# Patient Record
Sex: MALE | Race: WHITE | NOT HISPANIC OR LATINO | URBAN - METROPOLITAN AREA
[De-identification: names, ages, dates, MRNs, and addresses within clinical notes are randomized per-mention and may not be internally consistent; named-entity substitution may affect disease eponyms.]

---

## 2017-02-16 ENCOUNTER — EMERGENCY (EMERGENCY)
Facility: HOSPITAL | Age: 52
LOS: 1 days | Discharge: HOME | End: 2017-02-16
Attending: EMERGENCY MEDICINE
Payer: MEDICARE

## 2017-02-16 VITALS
SYSTOLIC BLOOD PRESSURE: 152 MMHG | WEIGHT: 158.07 LBS | OXYGEN SATURATION: 3 % | RESPIRATION RATE: 20 BRPM | HEIGHT: 73 IN | HEART RATE: 99 BPM | DIASTOLIC BLOOD PRESSURE: 91 MMHG | TEMPERATURE: 99 F

## 2017-02-16 DIAGNOSIS — E11.9 TYPE 2 DIABETES MELLITUS WITHOUT COMPLICATIONS: ICD-10-CM

## 2017-02-16 DIAGNOSIS — F17.200 NICOTINE DEPENDENCE, UNSPECIFIED, UNCOMPLICATED: ICD-10-CM

## 2017-02-16 DIAGNOSIS — R11.2 NAUSEA WITH VOMITING, UNSPECIFIED: ICD-10-CM

## 2017-02-16 DIAGNOSIS — R11.10 VOMITING, UNSPECIFIED: ICD-10-CM

## 2017-02-16 LAB
ALBUMIN SERPL ELPH-MCNC: 4.7 G/DL — SIGNIFICANT CHANGE UP (ref 3.3–5.2)
ALP SERPL-CCNC: 89 U/L — SIGNIFICANT CHANGE UP (ref 40–120)
ALT FLD-CCNC: 8 U/L — SIGNIFICANT CHANGE UP
ANION GAP SERPL CALC-SCNC: 15 MMOL/L — SIGNIFICANT CHANGE UP (ref 5–17)
APPEARANCE UR: CLEAR — SIGNIFICANT CHANGE UP
AST SERPL-CCNC: 10 U/L — SIGNIFICANT CHANGE UP
BASOPHILS # BLD AUTO: 0 K/UL — SIGNIFICANT CHANGE UP (ref 0–0.2)
BASOPHILS NFR BLD AUTO: 0 % — SIGNIFICANT CHANGE UP (ref 0–2)
BILIRUB SERPL-MCNC: 0.5 MG/DL — SIGNIFICANT CHANGE UP (ref 0.4–2)
BILIRUB UR-MCNC: NEGATIVE — SIGNIFICANT CHANGE UP
BUN SERPL-MCNC: 14 MG/DL — SIGNIFICANT CHANGE UP (ref 8–20)
CALCIUM SERPL-MCNC: 9.8 MG/DL — SIGNIFICANT CHANGE UP (ref 8.6–10.2)
CHLORIDE SERPL-SCNC: 94 MMOL/L — LOW (ref 98–107)
CO2 SERPL-SCNC: 34 MMOL/L — HIGH (ref 22–29)
COLOR SPEC: YELLOW — SIGNIFICANT CHANGE UP
CREAT SERPL-MCNC: 0.59 MG/DL — SIGNIFICANT CHANGE UP (ref 0.5–1.3)
DIFF PNL FLD: NEGATIVE — SIGNIFICANT CHANGE UP
EPI CELLS # UR: SIGNIFICANT CHANGE UP
GLUCOSE SERPL-MCNC: 343 MG/DL — HIGH (ref 70–115)
GLUCOSE UR QL: 1000 MG/DL
HCT VFR BLD CALC: 48.4 % — SIGNIFICANT CHANGE UP (ref 42–52)
HGB BLD-MCNC: 16.9 G/DL — SIGNIFICANT CHANGE UP (ref 14–18)
KETONES UR-MCNC: ABNORMAL
LEUKOCYTE ESTERASE UR-ACNC: NEGATIVE — SIGNIFICANT CHANGE UP
LIDOCAIN IGE QN: 6 U/L — LOW (ref 22–51)
LYMPHOCYTES # BLD AUTO: 0.6 K/UL — LOW (ref 1–4.8)
LYMPHOCYTES # BLD AUTO: 2 % — LOW (ref 20–55)
MCHC RBC-ENTMCNC: 31.4 PG — HIGH (ref 27–31)
MCHC RBC-ENTMCNC: 34.9 G/DL — SIGNIFICANT CHANGE UP (ref 32–36)
MCV RBC AUTO: 89.8 FL — SIGNIFICANT CHANGE UP (ref 80–94)
MONOCYTES # BLD AUTO: 0.5 K/UL — SIGNIFICANT CHANGE UP (ref 0–0.8)
MONOCYTES NFR BLD AUTO: 3 % — SIGNIFICANT CHANGE UP (ref 3–10)
NEUTROPHILS # BLD AUTO: 8.3 K/UL — HIGH (ref 1.8–8)
NEUTROPHILS NFR BLD AUTO: 88 % — HIGH (ref 37–73)
NEUTS BAND # BLD: 5 % — SIGNIFICANT CHANGE UP (ref 0–8)
NITRITE UR-MCNC: NEGATIVE — SIGNIFICANT CHANGE UP
PH UR: 8 — SIGNIFICANT CHANGE UP (ref 4.8–8)
PLAT MORPH BLD: NORMAL — SIGNIFICANT CHANGE UP
PLATELET # BLD AUTO: 137 K/UL — LOW (ref 150–400)
POTASSIUM SERPL-MCNC: 3.5 MMOL/L — SIGNIFICANT CHANGE UP (ref 3.5–5.3)
POTASSIUM SERPL-SCNC: 3.5 MMOL/L — SIGNIFICANT CHANGE UP (ref 3.5–5.3)
PROT SERPL-MCNC: 7.8 G/DL — SIGNIFICANT CHANGE UP (ref 6.6–8.7)
PROT UR-MCNC: 30 MG/DL
RBC # BLD: 5.39 M/UL — SIGNIFICANT CHANGE UP (ref 4.6–6.2)
RBC # FLD: 13.1 % — SIGNIFICANT CHANGE UP (ref 11–15.6)
RBC BLD AUTO: NORMAL — SIGNIFICANT CHANGE UP
SODIUM SERPL-SCNC: 143 MMOL/L — SIGNIFICANT CHANGE UP (ref 135–145)
SP GR SPEC: 1.01 — SIGNIFICANT CHANGE UP (ref 1.01–1.02)
UROBILINOGEN FLD QL: NEGATIVE MG/DL — SIGNIFICANT CHANGE UP
VARIANT LYMPHS # BLD: 2 % — SIGNIFICANT CHANGE UP (ref 0–6)
WBC # BLD: 9.4 K/UL — SIGNIFICANT CHANGE UP (ref 4.8–10.8)
WBC # FLD AUTO: 9.4 K/UL — SIGNIFICANT CHANGE UP (ref 4.8–10.8)

## 2017-02-16 PROCEDURE — 99284 EMERGENCY DEPT VISIT MOD MDM: CPT

## 2017-02-16 RX ORDER — SODIUM CHLORIDE 9 MG/ML
3 INJECTION INTRAMUSCULAR; INTRAVENOUS; SUBCUTANEOUS ONCE
Qty: 0 | Refills: 0 | Status: COMPLETED | OUTPATIENT
Start: 2017-02-16 | End: 2017-02-16

## 2017-02-16 RX ORDER — SODIUM CHLORIDE 9 MG/ML
1000 INJECTION INTRAMUSCULAR; INTRAVENOUS; SUBCUTANEOUS ONCE
Qty: 0 | Refills: 0 | Status: COMPLETED | OUTPATIENT
Start: 2017-02-16 | End: 2017-02-16

## 2017-02-16 RX ORDER — PANTOPRAZOLE SODIUM 20 MG/1
40 TABLET, DELAYED RELEASE ORAL ONCE
Qty: 0 | Refills: 0 | Status: COMPLETED | OUTPATIENT
Start: 2017-02-16 | End: 2017-02-16

## 2017-02-16 RX ORDER — ONDANSETRON 8 MG/1
4 TABLET, FILM COATED ORAL ONCE
Qty: 0 | Refills: 0 | Status: COMPLETED | OUTPATIENT
Start: 2017-02-16 | End: 2017-02-16

## 2017-02-16 RX ADMIN — ONDANSETRON 4 MILLIGRAM(S): 8 TABLET, FILM COATED ORAL at 20:59

## 2017-02-16 RX ADMIN — PANTOPRAZOLE SODIUM 40 MILLIGRAM(S): 20 TABLET, DELAYED RELEASE ORAL at 21:06

## 2017-02-16 RX ADMIN — SODIUM CHLORIDE 3 MILLILITER(S): 9 INJECTION INTRAMUSCULAR; INTRAVENOUS; SUBCUTANEOUS at 20:59

## 2017-02-16 RX ADMIN — SODIUM CHLORIDE 1000 MILLILITER(S): 9 INJECTION INTRAMUSCULAR; INTRAVENOUS; SUBCUTANEOUS at 20:59

## 2017-02-16 NOTE — ED PROVIDER NOTE - MEDICAL DECISION MAKING DETAILS
Pt with multiple episodes of vomiting. Abd soft nontender. Will give fluids, zofran. Pt will be signed out to Dr. Lewis pending re-eval.

## 2017-02-16 NOTE — ED ADULT NURSE NOTE - OBJECTIVE STATEMENT
Assumed pt care @ 2030. Pt received sitting on stretcher in NAD. Pt AOx3 C/O  vomiting since today. No other symptoms. Pt is a diabetic. Neuro WNL. PERRLA. Lungs CTA, RR even unlabored. Abd soft non tender, + bowel sounds x 4quads. Skin warm, dry, color appropriate for age and race.

## 2017-02-16 NOTE — ED PROVIDER NOTE - PROGRESS NOTE DETAILS
Pt. was noted to be febrile prior to being discharged. Pt. had tolerated PO. NO vomiting. No abdominal pain. Pt. stated that he felt better. Pt. was found to be shaky and diaphoretic. No cough. NO sob. Tylenol ordered for fever and CXR ordered to r/o possible pneumonia. Pt. waiting for cxr. Pt. with no complaint. Pt. sleeping. Pt. re-evaluated. Pt. now afebrile. No SOB. No cough. Pt.'s cxr with no obvious signs of pneumonia. Pt. able to stand and ambulate. Still no abdominal pain. No vomiting. Pt. will be given cab voucher to go back to her nursing home.

## 2017-02-17 VITALS
TEMPERATURE: 99 F | HEART RATE: 88 BPM | DIASTOLIC BLOOD PRESSURE: 74 MMHG | SYSTOLIC BLOOD PRESSURE: 129 MMHG | RESPIRATION RATE: 19 BRPM | OXYGEN SATURATION: 93 %

## 2017-02-17 PROCEDURE — 83690 ASSAY OF LIPASE: CPT

## 2017-02-17 PROCEDURE — 96374 THER/PROPH/DIAG INJ IV PUSH: CPT

## 2017-02-17 PROCEDURE — 80053 COMPREHEN METABOLIC PANEL: CPT

## 2017-02-17 PROCEDURE — 96375 TX/PRO/DX INJ NEW DRUG ADDON: CPT

## 2017-02-17 PROCEDURE — 84484 ASSAY OF TROPONIN QUANT: CPT

## 2017-02-17 PROCEDURE — 99284 EMERGENCY DEPT VISIT MOD MDM: CPT | Mod: 25

## 2017-02-17 PROCEDURE — 71046 X-RAY EXAM CHEST 2 VIEWS: CPT

## 2017-02-17 PROCEDURE — 71020: CPT | Mod: 26

## 2017-02-17 PROCEDURE — 81001 URINALYSIS AUTO W/SCOPE: CPT

## 2017-02-17 PROCEDURE — 85027 COMPLETE CBC AUTOMATED: CPT

## 2017-02-17 RX ORDER — SODIUM CHLORIDE 9 MG/ML
1000 INJECTION INTRAMUSCULAR; INTRAVENOUS; SUBCUTANEOUS ONCE
Qty: 0 | Refills: 0 | Status: COMPLETED | OUTPATIENT
Start: 2017-02-17 | End: 2017-02-17

## 2017-02-17 RX ORDER — ACETAMINOPHEN 500 MG
650 TABLET ORAL ONCE
Qty: 0 | Refills: 0 | Status: COMPLETED | OUTPATIENT
Start: 2017-02-17 | End: 2017-02-17

## 2017-02-17 RX ADMIN — Medication 650 MILLIGRAM(S): at 03:15

## 2017-02-17 RX ADMIN — SODIUM CHLORIDE 2000 MILLILITER(S): 9 INJECTION INTRAMUSCULAR; INTRAVENOUS; SUBCUTANEOUS at 03:05

## 2017-02-17 NOTE — ED ADULT NURSE REASSESSMENT NOTE - NS ED NURSE REASSESS COMMENT FT1
Pt tolerated po fluids without difficulty. Pt found with beads of sweat across the forehead, vitals and sugar checked. MD Lewis aware of results. Will continue to monitor.

## 2017-03-05 ENCOUNTER — EMERGENCY (EMERGENCY)
Facility: HOSPITAL | Age: 52
LOS: 1 days | Discharge: HOME | End: 2017-03-05
Attending: EMERGENCY MEDICINE
Payer: MEDICARE

## 2017-03-05 VITALS
DIASTOLIC BLOOD PRESSURE: 71 MMHG | OXYGEN SATURATION: 96 % | HEART RATE: 93 BPM | SYSTOLIC BLOOD PRESSURE: 110 MMHG | WEIGHT: 182.98 LBS | TEMPERATURE: 98 F | RESPIRATION RATE: 18 BRPM | HEIGHT: 73 IN

## 2017-03-05 VITALS
TEMPERATURE: 97 F | HEART RATE: 69 BPM | DIASTOLIC BLOOD PRESSURE: 74 MMHG | OXYGEN SATURATION: 97 % | RESPIRATION RATE: 20 BRPM | SYSTOLIC BLOOD PRESSURE: 113 MMHG

## 2017-03-05 DIAGNOSIS — J44.9 CHRONIC OBSTRUCTIVE PULMONARY DISEASE, UNSPECIFIED: ICD-10-CM

## 2017-03-05 DIAGNOSIS — F20.0 PARANOID SCHIZOPHRENIA: ICD-10-CM

## 2017-03-05 DIAGNOSIS — E11.65 TYPE 2 DIABETES MELLITUS WITH HYPERGLYCEMIA: ICD-10-CM

## 2017-03-05 DIAGNOSIS — F17.210 NICOTINE DEPENDENCE, CIGARETTES, UNCOMPLICATED: ICD-10-CM

## 2017-03-05 DIAGNOSIS — R45.1 RESTLESSNESS AND AGITATION: ICD-10-CM

## 2017-03-05 DIAGNOSIS — R69 ILLNESS, UNSPECIFIED: ICD-10-CM

## 2017-03-05 LAB
ALBUMIN SERPL ELPH-MCNC: 4 G/DL — SIGNIFICANT CHANGE UP (ref 3.3–5.2)
ALP SERPL-CCNC: 78 U/L — SIGNIFICANT CHANGE UP (ref 40–120)
ALT FLD-CCNC: 7 U/L — SIGNIFICANT CHANGE UP
ANION GAP SERPL CALC-SCNC: 11 MMOL/L — SIGNIFICANT CHANGE UP (ref 5–17)
APPEARANCE UR: CLEAR — SIGNIFICANT CHANGE UP
AST SERPL-CCNC: 8 U/L — SIGNIFICANT CHANGE UP
BASOPHILS # BLD AUTO: 0 K/UL — SIGNIFICANT CHANGE UP (ref 0–0.2)
BASOPHILS NFR BLD AUTO: 0.2 % — SIGNIFICANT CHANGE UP (ref 0–2)
BILIRUB SERPL-MCNC: 0.2 MG/DL — LOW (ref 0.4–2)
BILIRUB UR-MCNC: NEGATIVE — SIGNIFICANT CHANGE UP
BUN SERPL-MCNC: 13 MG/DL — SIGNIFICANT CHANGE UP (ref 8–20)
CALCIUM SERPL-MCNC: 9.2 MG/DL — SIGNIFICANT CHANGE UP (ref 8.6–10.2)
CHLORIDE SERPL-SCNC: 97 MMOL/L — LOW (ref 98–107)
CO2 SERPL-SCNC: 29 MMOL/L — SIGNIFICANT CHANGE UP (ref 22–29)
COLOR SPEC: YELLOW — SIGNIFICANT CHANGE UP
CREAT SERPL-MCNC: 0.71 MG/DL — SIGNIFICANT CHANGE UP (ref 0.5–1.3)
DIFF PNL FLD: NEGATIVE — SIGNIFICANT CHANGE UP
EOSINOPHIL # BLD AUTO: 0.1 K/UL — SIGNIFICANT CHANGE UP (ref 0–0.5)
EOSINOPHIL NFR BLD AUTO: 1.4 % — SIGNIFICANT CHANGE UP (ref 0–5)
GLUCOSE SERPL-MCNC: 372 MG/DL — HIGH (ref 70–115)
GLUCOSE UR QL: 1000 MG/DL
HCT VFR BLD CALC: 38.7 % — LOW (ref 42–52)
HGB BLD-MCNC: 13.1 G/DL — LOW (ref 14–18)
KETONES UR-MCNC: ABNORMAL
LEUKOCYTE ESTERASE UR-ACNC: NEGATIVE — SIGNIFICANT CHANGE UP
LYMPHOCYTES # BLD AUTO: 1.3 K/UL — SIGNIFICANT CHANGE UP (ref 1–4.8)
LYMPHOCYTES # BLD AUTO: 27.3 % — SIGNIFICANT CHANGE UP (ref 20–55)
MCHC RBC-ENTMCNC: 30.6 PG — SIGNIFICANT CHANGE UP (ref 27–31)
MCHC RBC-ENTMCNC: 33.9 G/DL — SIGNIFICANT CHANGE UP (ref 32–36)
MCV RBC AUTO: 90.4 FL — SIGNIFICANT CHANGE UP (ref 80–94)
MONOCYTES # BLD AUTO: 0.5 K/UL — SIGNIFICANT CHANGE UP (ref 0–0.8)
MONOCYTES NFR BLD AUTO: 10.4 % — HIGH (ref 3–10)
NEUTROPHILS # BLD AUTO: 2.9 K/UL — SIGNIFICANT CHANGE UP (ref 1.8–8)
NEUTROPHILS NFR BLD AUTO: 60.7 % — SIGNIFICANT CHANGE UP (ref 37–73)
NITRITE UR-MCNC: NEGATIVE — SIGNIFICANT CHANGE UP
PCP SPEC-MCNC: SIGNIFICANT CHANGE UP
PH UR: 6.5 — SIGNIFICANT CHANGE UP (ref 4.8–8)
PLATELET # BLD AUTO: 228 K/UL — SIGNIFICANT CHANGE UP (ref 150–400)
POTASSIUM SERPL-MCNC: 4.2 MMOL/L — SIGNIFICANT CHANGE UP (ref 3.5–5.3)
POTASSIUM SERPL-SCNC: 4.2 MMOL/L — SIGNIFICANT CHANGE UP (ref 3.5–5.3)
PROT SERPL-MCNC: 6.5 G/DL — LOW (ref 6.6–8.7)
PROT UR-MCNC: NEGATIVE MG/DL — SIGNIFICANT CHANGE UP
RBC # BLD: 4.28 M/UL — LOW (ref 4.6–6.2)
RBC # FLD: 13.7 % — SIGNIFICANT CHANGE UP (ref 11–15.6)
SODIUM SERPL-SCNC: 137 MMOL/L — SIGNIFICANT CHANGE UP (ref 135–145)
SP GR SPEC: 1.01 — SIGNIFICANT CHANGE UP (ref 1.01–1.02)
UROBILINOGEN FLD QL: NEGATIVE MG/DL — SIGNIFICANT CHANGE UP
WBC # BLD: 4.8 K/UL — SIGNIFICANT CHANGE UP (ref 4.8–10.8)
WBC # FLD AUTO: 4.8 K/UL — SIGNIFICANT CHANGE UP (ref 4.8–10.8)

## 2017-03-05 PROCEDURE — 81003 URINALYSIS AUTO W/O SCOPE: CPT

## 2017-03-05 PROCEDURE — 85027 COMPLETE CBC AUTOMATED: CPT

## 2017-03-05 PROCEDURE — 99284 EMERGENCY DEPT VISIT MOD MDM: CPT

## 2017-03-05 PROCEDURE — 96372 THER/PROPH/DIAG INJ SC/IM: CPT

## 2017-03-05 PROCEDURE — 93010 ELECTROCARDIOGRAM REPORT: CPT

## 2017-03-05 PROCEDURE — 90792 PSYCH DIAG EVAL W/MED SRVCS: CPT

## 2017-03-05 PROCEDURE — 80053 COMPREHEN METABOLIC PANEL: CPT

## 2017-03-05 PROCEDURE — 80307 DRUG TEST PRSMV CHEM ANLYZR: CPT

## 2017-03-05 PROCEDURE — 99284 EMERGENCY DEPT VISIT MOD MDM: CPT | Mod: 25

## 2017-03-05 PROCEDURE — 93005 ELECTROCARDIOGRAM TRACING: CPT

## 2017-03-05 RX ORDER — INSULIN HUMAN 100 [IU]/ML
6 INJECTION, SOLUTION SUBCUTANEOUS ONCE
Qty: 0 | Refills: 0 | Status: COMPLETED | OUTPATIENT
Start: 2017-03-05 | End: 2017-03-05

## 2017-03-05 RX ADMIN — INSULIN HUMAN 6 UNIT(S): 100 INJECTION, SOLUTION SUBCUTANEOUS at 21:42
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
## 2017-03-05 NOTE — ED BEHAVIORAL HEALTH ASSESSMENT NOTE - CURRENT MEDICATION
Haldol 10 mg TID; Zoloft 100 mg; Trazodone 150 mg; Klonopin 2 mg TID; Geodon 20 mg BID  Albuterol 0.83% Nebulizer Solution; Aspirin EC 81 mg; Cogentin 1 mg TID; Colace 100 mg TID; [id number] U BID; Prilosec 20 mg OTC; Singulair 10 mg HS; Travatan .004% 1 drop Daily B/L.

## 2017-03-05 NOTE — ED PROVIDER NOTE - NS ED MD SCRIBE ATTENDING SCRIBE SECTIONS
VITAL SIGNS( Pullset)/HIV/HISTORY OF PRESENT ILLNESS/PAST MEDICAL/SURGICAL/SOCIAL HISTORY/INTAKE ASSESSMENT/SCREENINGS/PHYSICAL EXAM/DISPOSITION/REVIEW OF SYSTEMS

## 2017-03-05 NOTE — ED PROVIDER NOTE - CONSTITUTIONAL, MLM
normal... smells of cigarettes, awake, alert, oriented to person, place, time/situation and in no apparent distress.
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
## 2017-03-05 NOTE — ED BEHAVIORAL HEALTH NOTE - BEHAVIORAL HEALTH NOTE
Dianna: pt seen by psych MD(Dr Schultz) pt treat and release to return to his adult home [facility]. Worker called facility to inform them about pt's d/c, spoke with Meredith, verbalized understanding. [facility] transport called (Ratna stratton arranged ETA within one hour.
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
## 2017-03-05 NOTE — ED PROVIDER NOTE - PROGRESS NOTE DETAILS
pt medically cleared.  h/o dm with mild hyperglycemia - will treat with regular insulin and d/c home.  no acidosis

## 2017-04-15 ENCOUNTER — EMERGENCY (EMERGENCY)
Facility: HOSPITAL | Age: 52
LOS: 1 days | Discharge: HOME | End: 2017-04-15
Attending: EMERGENCY MEDICINE
Payer: MEDICARE

## 2017-04-15 VITALS
OXYGEN SATURATION: 97 % | RESPIRATION RATE: 16 BRPM | WEIGHT: 182.98 LBS | HEART RATE: 74 BPM | TEMPERATURE: 99 F | HEIGHT: 73 IN | SYSTOLIC BLOOD PRESSURE: 117 MMHG | DIASTOLIC BLOOD PRESSURE: 72 MMHG

## 2017-04-15 DIAGNOSIS — R45.89 OTHER SYMPTOMS AND SIGNS INVOLVING EMOTIONAL STATE: ICD-10-CM

## 2017-04-15 DIAGNOSIS — E11.9 TYPE 2 DIABETES MELLITUS WITHOUT COMPLICATIONS: ICD-10-CM

## 2017-04-15 PROCEDURE — 99283 EMERGENCY DEPT VISIT LOW MDM: CPT

## 2017-04-15 NOTE — ED PROVIDER NOTE - NS ED MD SCRIBE ATTENDING SCRIBE SECTIONS
PAST MEDICAL/SURGICAL/SOCIAL HISTORY/REVIEW OF SYSTEMS/DISPOSITION/VITAL SIGNS( Pullset)/HIV/HISTORY OF PRESENT ILLNESS/PHYSICAL EXAM
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
## 2017-04-15 NOTE — ED PROVIDER NOTE - MEDICAL DECISION MAKING DETAILS
pt stable in ED nml vital neuro: CN II - XII intact, EOMI, PERRL, no papilledema, 5/5 muscle strength x 4 extremities, no sensory deficits, 2+ dtr globally, negative babinski, no ataxic gait, normal RAIN and FNT, normal romberg   displays no sigsn of agrression or suicidal/homicidal behavior he is stable for discahrge back to [facility]

## 2017-04-16 VITALS
RESPIRATION RATE: 16 BRPM | SYSTOLIC BLOOD PRESSURE: 96 MMHG | HEART RATE: 80 BPM | OXYGEN SATURATION: 93 % | TEMPERATURE: 98 F | DIASTOLIC BLOOD PRESSURE: 63 MMHG

## 2017-04-17 ENCOUNTER — EMERGENCY (EMERGENCY)
Facility: HOSPITAL | Age: 52
LOS: 1 days | Discharge: HOME | End: 2017-04-17
Attending: EMERGENCY MEDICINE | Admitting: EMERGENCY MEDICINE
Payer: MEDICARE

## 2017-04-17 VITALS
OXYGEN SATURATION: 97 % | HEIGHT: 68 IN | DIASTOLIC BLOOD PRESSURE: 97 MMHG | RESPIRATION RATE: 16 BRPM | TEMPERATURE: 98 F | HEART RATE: 89 BPM | WEIGHT: 175.05 LBS | SYSTOLIC BLOOD PRESSURE: 152 MMHG

## 2017-04-17 VITALS
SYSTOLIC BLOOD PRESSURE: 111 MMHG | HEART RATE: 78 BPM | OXYGEN SATURATION: 97 % | DIASTOLIC BLOOD PRESSURE: 68 MMHG | TEMPERATURE: 98 F | RESPIRATION RATE: 16 BRPM

## 2017-04-17 LAB
ALBUMIN SERPL ELPH-MCNC: 4.5 G/DL — SIGNIFICANT CHANGE UP (ref 3.3–5.2)
ALP SERPL-CCNC: 89 U/L — SIGNIFICANT CHANGE UP (ref 40–120)
ALT FLD-CCNC: 8 U/L — SIGNIFICANT CHANGE UP
ANION GAP SERPL CALC-SCNC: 15 MMOL/L — SIGNIFICANT CHANGE UP (ref 5–17)
AST SERPL-CCNC: 10 U/L — SIGNIFICANT CHANGE UP
BILIRUB SERPL-MCNC: 0.6 MG/DL — SIGNIFICANT CHANGE UP (ref 0.4–2)
BUN SERPL-MCNC: 17 MG/DL — SIGNIFICANT CHANGE UP (ref 8–20)
CALCIUM SERPL-MCNC: 9.6 MG/DL — SIGNIFICANT CHANGE UP (ref 8.6–10.2)
CHLORIDE SERPL-SCNC: 95 MMOL/L — LOW (ref 98–107)
CO2 SERPL-SCNC: 29 MMOL/L — SIGNIFICANT CHANGE UP (ref 22–29)
CREAT SERPL-MCNC: 0.6 MG/DL — SIGNIFICANT CHANGE UP (ref 0.5–1.3)
GLUCOSE SERPL-MCNC: 303 MG/DL — HIGH (ref 70–115)
HCT VFR BLD CALC: 43.6 % — SIGNIFICANT CHANGE UP (ref 42–52)
HGB BLD-MCNC: 14.5 G/DL — SIGNIFICANT CHANGE UP (ref 14–18)
LIDOCAIN IGE QN: 8 U/L — LOW (ref 22–51)
LYMPHOCYTES # BLD AUTO: 0.4 K/UL — LOW (ref 1–4.8)
LYMPHOCYTES # BLD AUTO: 4.7 % — LOW (ref 20–55)
MCHC RBC-ENTMCNC: 28.3 PG — SIGNIFICANT CHANGE UP (ref 27–31)
MCHC RBC-ENTMCNC: 33.3 G/DL — SIGNIFICANT CHANGE UP (ref 32–36)
MCV RBC AUTO: 85.2 FL — SIGNIFICANT CHANGE UP (ref 80–94)
MONOCYTES # BLD AUTO: 0.5 K/UL — SIGNIFICANT CHANGE UP (ref 0–0.8)
MONOCYTES NFR BLD AUTO: 5.4 % — SIGNIFICANT CHANGE UP (ref 3–10)
NEUTROPHILS # BLD AUTO: 8.1 K/UL — HIGH (ref 1.8–8)
NEUTROPHILS NFR BLD AUTO: 89.8 % — HIGH (ref 37–73)
PLATELET # BLD AUTO: 152 K/UL — SIGNIFICANT CHANGE UP (ref 150–400)
POTASSIUM SERPL-MCNC: 3.8 MMOL/L — SIGNIFICANT CHANGE UP (ref 3.5–5.3)
POTASSIUM SERPL-SCNC: 3.8 MMOL/L — SIGNIFICANT CHANGE UP (ref 3.5–5.3)
PROT SERPL-MCNC: 7.4 G/DL — SIGNIFICANT CHANGE UP (ref 6.6–8.7)
RBC # BLD: 5.12 M/UL — SIGNIFICANT CHANGE UP (ref 4.6–6.2)
RBC # FLD: 13.1 % — SIGNIFICANT CHANGE UP (ref 11–15.6)
SODIUM SERPL-SCNC: 139 MMOL/L — SIGNIFICANT CHANGE UP (ref 135–145)
WBC # BLD: 9 K/UL — SIGNIFICANT CHANGE UP (ref 4.8–10.8)
WBC # FLD AUTO: 9 K/UL — SIGNIFICANT CHANGE UP (ref 4.8–10.8)

## 2017-04-17 PROCEDURE — 85027 COMPLETE CBC AUTOMATED: CPT

## 2017-04-17 PROCEDURE — 96375 TX/PRO/DX INJ NEW DRUG ADDON: CPT

## 2017-04-17 PROCEDURE — 99284 EMERGENCY DEPT VISIT MOD MDM: CPT | Mod: 25

## 2017-04-17 PROCEDURE — 80053 COMPREHEN METABOLIC PANEL: CPT

## 2017-04-17 PROCEDURE — 99284 EMERGENCY DEPT VISIT MOD MDM: CPT

## 2017-04-17 PROCEDURE — 96374 THER/PROPH/DIAG INJ IV PUSH: CPT

## 2017-04-17 PROCEDURE — 83690 ASSAY OF LIPASE: CPT

## 2017-04-17 RX ORDER — FAMOTIDINE 10 MG/ML
20 INJECTION INTRAVENOUS ONCE
Qty: 0 | Refills: 0 | Status: COMPLETED | OUTPATIENT
Start: 2017-04-17 | End: 2017-04-17

## 2017-04-17 RX ORDER — SODIUM CHLORIDE 9 MG/ML
3 INJECTION INTRAMUSCULAR; INTRAVENOUS; SUBCUTANEOUS ONCE
Qty: 0 | Refills: 0 | Status: COMPLETED | OUTPATIENT
Start: 2017-04-17 | End: 2017-04-17

## 2017-04-17 RX ORDER — KETOROLAC TROMETHAMINE 30 MG/ML
30 SYRINGE (ML) INJECTION ONCE
Qty: 0 | Refills: 0 | Status: DISCONTINUED | OUTPATIENT
Start: 2017-04-17 | End: 2017-04-17

## 2017-04-17 RX ORDER — ONDANSETRON 8 MG/1
4 TABLET, FILM COATED ORAL ONCE
Qty: 0 | Refills: 0 | Status: COMPLETED | OUTPATIENT
Start: 2017-04-17 | End: 2017-04-17

## 2017-04-17 RX ORDER — SODIUM CHLORIDE 9 MG/ML
1000 INJECTION INTRAMUSCULAR; INTRAVENOUS; SUBCUTANEOUS
Qty: 0 | Refills: 0 | Status: DISCONTINUED | OUTPATIENT
Start: 2017-04-17 | End: 2017-04-21

## 2017-04-17 RX ORDER — LIDOCAINE 4 G/100G
10 CREAM TOPICAL ONCE
Qty: 0 | Refills: 0 | Status: COMPLETED | OUTPATIENT
Start: 2017-04-17 | End: 2017-04-17

## 2017-04-17 RX ADMIN — ONDANSETRON 4 MILLIGRAM(S): 8 TABLET, FILM COATED ORAL at 14:13

## 2017-04-17 RX ADMIN — Medication 30 MILLILITER(S): at 15:00

## 2017-04-17 RX ADMIN — LIDOCAINE 10 MILLILITER(S): 4 CREAM TOPICAL at 15:00

## 2017-04-17 RX ADMIN — SODIUM CHLORIDE 125 MILLILITER(S): 9 INJECTION INTRAMUSCULAR; INTRAVENOUS; SUBCUTANEOUS at 14:13

## 2017-04-17 RX ADMIN — Medication 30 MILLIGRAM(S): at 14:14

## 2017-04-17 RX ADMIN — FAMOTIDINE 20 MILLIGRAM(S): 10 INJECTION INTRAVENOUS at 14:18

## 2017-04-17 RX ADMIN — Medication 30 MILLIGRAM(S): at 15:00

## 2017-04-17 RX ADMIN — SODIUM CHLORIDE 3 MILLILITER(S): 9 INJECTION INTRAMUSCULAR; INTRAVENOUS; SUBCUTANEOUS at 14:13

## 2017-04-17 NOTE — ED PROVIDER NOTE - OBJECTIVE STATEMENT
50 y/o male in ED c/o n/v/abd pain x 1 day.  pt states started after eating something.  no meds taken for pain or vomiting.  pt denies any fever, HA, cp, sob.  no sick contacts or recent travel.  pt states unable to tolerate PO

## 2017-04-17 NOTE — ED PROVIDER NOTE - PMH
Bronchitis    Depression    DM (diabetes mellitus)    Gastritis    GI bleed    Hypertension    Schizophrenia

## 2017-04-17 NOTE — ED ADULT NURSE REASSESSMENT NOTE - NS ED NURSE REASSESS COMMENT FT1
pt resting in bed comfortably with no signs of distress.  medicated as ordered.  IV removed by pt, new IV inserted.  awaiting lab results.  pt aware of plan of care.  will continue to monitor.

## 2017-04-17 NOTE — ED ADULT NURSE NOTE - OBJECTIVE STATEMENT
Assumed pt care at 1220.  pt awake alert and oriented x3.  pt c/o generalized abdominal pain, nausea and vomiting x2 days.  denies diarrhea or fever.  abdomen soft, nontender.  bowel sounds present x4 quadrants.  no further complaints.  respirations even and unlabored.  denies chest pain.  moving all extremities well and with purpose.

## 2017-04-17 NOTE — ED PROVIDER NOTE - PROGRESS NOTE DETAILS
pt resting comfortably.  tolerating PO in ED.  no vomiting noted in ED.  will d/c home with outpt f/u.

## 2017-04-17 NOTE — ED ADULT NURSE NOTE - PMH
DM (diabetes mellitus) Bronchitis    Depression    DM (diabetes mellitus)    Gastritis    GI bleed    Hypertension    Schizophrenia

## 2018-03-11 ENCOUNTER — EMERGENCY (EMERGENCY)
Facility: HOSPITAL | Age: 53
LOS: 1 days | Discharge: HOME | End: 2018-03-11
Attending: EMERGENCY MEDICINE
Payer: MEDICARE

## 2018-03-11 VITALS
HEIGHT: 72 IN | SYSTOLIC BLOOD PRESSURE: 115 MMHG | HEART RATE: 78 BPM | RESPIRATION RATE: 16 BRPM | DIASTOLIC BLOOD PRESSURE: 71 MMHG | WEIGHT: 205.03 LBS | TEMPERATURE: 98 F | OXYGEN SATURATION: 92 %

## 2018-03-11 DIAGNOSIS — R69 ILLNESS, UNSPECIFIED: ICD-10-CM

## 2018-03-11 DIAGNOSIS — F20.0 PARANOID SCHIZOPHRENIA: ICD-10-CM

## 2018-03-11 LAB
ALBUMIN SERPL ELPH-MCNC: 3.9 G/DL — SIGNIFICANT CHANGE UP (ref 3.3–5.2)
ALP SERPL-CCNC: 74 U/L — SIGNIFICANT CHANGE UP (ref 40–120)
ALT FLD-CCNC: 8 U/L — SIGNIFICANT CHANGE UP
AMPHET UR-MCNC: NEGATIVE — SIGNIFICANT CHANGE UP
ANION GAP SERPL CALC-SCNC: 10 MMOL/L — SIGNIFICANT CHANGE UP (ref 5–17)
APAP SERPL-MCNC: <7.5 UG/ML — LOW (ref 10–26)
AST SERPL-CCNC: 16 U/L — SIGNIFICANT CHANGE UP
BARBITURATES UR SCN-MCNC: NEGATIVE — SIGNIFICANT CHANGE UP
BASOPHILS # BLD AUTO: 0 K/UL — SIGNIFICANT CHANGE UP (ref 0–0.2)
BASOPHILS NFR BLD AUTO: 0.3 % — SIGNIFICANT CHANGE UP (ref 0–2)
BENZODIAZ UR-MCNC: NEGATIVE — SIGNIFICANT CHANGE UP
BILIRUB SERPL-MCNC: <0.2 MG/DL — LOW (ref 0.4–2)
BUN SERPL-MCNC: 9 MG/DL — SIGNIFICANT CHANGE UP (ref 8–20)
CALCIUM SERPL-MCNC: 8.6 MG/DL — SIGNIFICANT CHANGE UP (ref 8.6–10.2)
CHLORIDE SERPL-SCNC: 96 MMOL/L — LOW (ref 98–107)
CO2 SERPL-SCNC: 30 MMOL/L — HIGH (ref 22–29)
COCAINE METAB.OTHER UR-MCNC: NEGATIVE — SIGNIFICANT CHANGE UP
CREAT SERPL-MCNC: 0.69 MG/DL — SIGNIFICANT CHANGE UP (ref 0.5–1.3)
EOSINOPHIL # BLD AUTO: 0.1 K/UL — SIGNIFICANT CHANGE UP (ref 0–0.5)
EOSINOPHIL NFR BLD AUTO: 1.7 % — SIGNIFICANT CHANGE UP (ref 0–5)
ETHANOL SERPL-MCNC: <10 MG/DL — SIGNIFICANT CHANGE UP
GLUCOSE SERPL-MCNC: 232 MG/DL — HIGH (ref 70–115)
HCT VFR BLD CALC: 43.4 % — SIGNIFICANT CHANGE UP (ref 42–52)
HGB BLD-MCNC: 14.5 G/DL — SIGNIFICANT CHANGE UP (ref 14–18)
LYMPHOCYTES # BLD AUTO: 1.9 K/UL — SIGNIFICANT CHANGE UP (ref 1–4.8)
LYMPHOCYTES # BLD AUTO: 24.8 % — SIGNIFICANT CHANGE UP (ref 20–55)
MCHC RBC-ENTMCNC: 29.6 PG — SIGNIFICANT CHANGE UP (ref 27–31)
MCHC RBC-ENTMCNC: 33.4 G/DL — SIGNIFICANT CHANGE UP (ref 32–36)
MCV RBC AUTO: 88.6 FL — SIGNIFICANT CHANGE UP (ref 80–94)
METHADONE UR-MCNC: NEGATIVE — SIGNIFICANT CHANGE UP
MONOCYTES # BLD AUTO: 1.2 K/UL — HIGH (ref 0–0.8)
MONOCYTES NFR BLD AUTO: 16.4 % — HIGH (ref 3–10)
NEUTROPHILS # BLD AUTO: 4.3 K/UL — SIGNIFICANT CHANGE UP (ref 1.8–8)
NEUTROPHILS NFR BLD AUTO: 56.7 % — SIGNIFICANT CHANGE UP (ref 37–73)
OPIATES UR-MCNC: NEGATIVE — SIGNIFICANT CHANGE UP
PCP SPEC-MCNC: SIGNIFICANT CHANGE UP
PCP UR-MCNC: NEGATIVE — SIGNIFICANT CHANGE UP
PLATELET # BLD AUTO: 168 K/UL — SIGNIFICANT CHANGE UP (ref 150–400)
POTASSIUM SERPL-MCNC: 4.1 MMOL/L — SIGNIFICANT CHANGE UP (ref 3.5–5.3)
POTASSIUM SERPL-SCNC: 4.1 MMOL/L — SIGNIFICANT CHANGE UP (ref 3.5–5.3)
PROT SERPL-MCNC: 6.3 G/DL — LOW (ref 6.6–8.7)
RBC # BLD: 4.9 M/UL — SIGNIFICANT CHANGE UP (ref 4.6–6.2)
RBC # FLD: 14.2 % — SIGNIFICANT CHANGE UP (ref 11–15.6)
SALICYLATES SERPL-MCNC: <0.6 MG/DL — LOW (ref 10–20)
SODIUM SERPL-SCNC: 136 MMOL/L — SIGNIFICANT CHANGE UP (ref 135–145)
THC UR QL: NEGATIVE — SIGNIFICANT CHANGE UP
WBC # BLD: 7.6 K/UL — SIGNIFICANT CHANGE UP (ref 4.8–10.8)
WBC # FLD AUTO: 7.6 K/UL — SIGNIFICANT CHANGE UP (ref 4.8–10.8)

## 2018-03-11 PROCEDURE — 70450 CT HEAD/BRAIN W/O DYE: CPT | Mod: 26

## 2018-03-11 PROCEDURE — 85027 COMPLETE CBC AUTOMATED: CPT

## 2018-03-11 PROCEDURE — 99284 EMERGENCY DEPT VISIT MOD MDM: CPT

## 2018-03-11 PROCEDURE — 93010 ELECTROCARDIOGRAM REPORT: CPT

## 2018-03-11 PROCEDURE — 99284 EMERGENCY DEPT VISIT MOD MDM: CPT | Mod: 25

## 2018-03-11 PROCEDURE — 93005 ELECTROCARDIOGRAM TRACING: CPT

## 2018-03-11 PROCEDURE — 80053 COMPREHEN METABOLIC PANEL: CPT

## 2018-03-11 PROCEDURE — 80307 DRUG TEST PRSMV CHEM ANLYZR: CPT

## 2018-03-11 PROCEDURE — 90792 PSYCH DIAG EVAL W/MED SRVCS: CPT

## 2018-03-11 PROCEDURE — 70450 CT HEAD/BRAIN W/O DYE: CPT

## 2018-03-11 PROCEDURE — 36415 COLL VENOUS BLD VENIPUNCTURE: CPT

## 2018-03-11 NOTE — ED PROVIDER NOTE - NEUROLOGICAL, MLM
Alert and oriented, no focal deficits, no motor or sensory deficits. Involuntary tremor in right hand

## 2018-03-11 NOTE — ED ADULT NURSE NOTE - OBJECTIVE STATEMENT
Assumed patient care at 2120.  Psychiatry service at bedside states patient will be going home if labs are normal.  Pt offers no complaints. Eating sandwich. Pt states has the shakes of his hands but that is normal for him.  Respirations even and unlabored.
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
## 2018-03-11 NOTE — ED BEHAVIORAL HEALTH ASSESSMENT NOTE - REFERRAL / APPOINTMENT DETAILS
1/20/2021        [facility] [address] [facility] [address]          Dear Herminia Singh,       Here are your results from your recent visit with Gastroenterology. Your stool test was negative for C Diff. Let us know how you are doing.     If you need Latesha Pearce NP--Saturday 03/18/2018
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
## 2018-05-01 ENCOUNTER — EMERGENCY (EMERGENCY)
Facility: HOSPITAL | Age: 53
LOS: 1 days | Discharge: HOME | End: 2018-05-01
Attending: EMERGENCY MEDICINE
Payer: MEDICARE

## 2018-05-01 VITALS
OXYGEN SATURATION: 94 % | TEMPERATURE: 99 F | WEIGHT: 201.94 LBS | DIASTOLIC BLOOD PRESSURE: 76 MMHG | SYSTOLIC BLOOD PRESSURE: 117 MMHG | HEART RATE: 85 BPM | RESPIRATION RATE: 17 BRPM | HEIGHT: 73 IN

## 2018-05-01 VITALS
HEART RATE: 62 BPM | TEMPERATURE: 98 F | RESPIRATION RATE: 18 BRPM | SYSTOLIC BLOOD PRESSURE: 122 MMHG | DIASTOLIC BLOOD PRESSURE: 73 MMHG | OXYGEN SATURATION: 95 %

## 2018-05-01 LAB
ALBUMIN SERPL ELPH-MCNC: 4 G/DL — SIGNIFICANT CHANGE UP (ref 3.3–5.2)
ALP SERPL-CCNC: 78 U/L — SIGNIFICANT CHANGE UP (ref 40–120)
ALT FLD-CCNC: 10 U/L — SIGNIFICANT CHANGE UP
ANION GAP SERPL CALC-SCNC: 10 MMOL/L — SIGNIFICANT CHANGE UP (ref 5–17)
AST SERPL-CCNC: 15 U/L — SIGNIFICANT CHANGE UP
BILIRUB SERPL-MCNC: 0.3 MG/DL — LOW (ref 0.4–2)
BUN SERPL-MCNC: 13 MG/DL — SIGNIFICANT CHANGE UP (ref 8–20)
CALCIUM SERPL-MCNC: 8.9 MG/DL — SIGNIFICANT CHANGE UP (ref 8.6–10.2)
CHLORIDE SERPL-SCNC: 98 MMOL/L — SIGNIFICANT CHANGE UP (ref 98–107)
CO2 SERPL-SCNC: 30 MMOL/L — HIGH (ref 22–29)
CREAT SERPL-MCNC: 0.61 MG/DL — SIGNIFICANT CHANGE UP (ref 0.5–1.3)
ETHANOL SERPL-MCNC: <10 MG/DL — SIGNIFICANT CHANGE UP
GLUCOSE SERPL-MCNC: 272 MG/DL — HIGH (ref 70–115)
HCT VFR BLD CALC: 46.1 % — SIGNIFICANT CHANGE UP (ref 42–52)
HGB BLD-MCNC: 14.9 G/DL — SIGNIFICANT CHANGE UP (ref 14–18)
MCHC RBC-ENTMCNC: 29.3 PG — SIGNIFICANT CHANGE UP (ref 27–31)
MCHC RBC-ENTMCNC: 32.3 G/DL — SIGNIFICANT CHANGE UP (ref 32–36)
MCV RBC AUTO: 90.6 FL — SIGNIFICANT CHANGE UP (ref 80–94)
PLATELET # BLD AUTO: 154 K/UL — SIGNIFICANT CHANGE UP (ref 150–400)
POTASSIUM SERPL-MCNC: 4.4 MMOL/L — SIGNIFICANT CHANGE UP (ref 3.5–5.3)
POTASSIUM SERPL-SCNC: 4.4 MMOL/L — SIGNIFICANT CHANGE UP (ref 3.5–5.3)
PROT SERPL-MCNC: 6.4 G/DL — LOW (ref 6.6–8.7)
RBC # BLD: 5.09 M/UL — SIGNIFICANT CHANGE UP (ref 4.6–6.2)
RBC # FLD: 15.1 % — SIGNIFICANT CHANGE UP (ref 11–15.6)
SODIUM SERPL-SCNC: 138 MMOL/L — SIGNIFICANT CHANGE UP (ref 135–145)
TSH SERPL-MCNC: 0.4 UIU/ML — SIGNIFICANT CHANGE UP (ref 0.27–4.2)
WBC # BLD: 5.6 K/UL — SIGNIFICANT CHANGE UP (ref 4.8–10.8)
WBC # FLD AUTO: 5.6 K/UL — SIGNIFICANT CHANGE UP (ref 4.8–10.8)

## 2018-05-01 PROCEDURE — 90792 PSYCH DIAG EVAL W/MED SRVCS: CPT

## 2018-05-01 PROCEDURE — 99284 EMERGENCY DEPT VISIT MOD MDM: CPT

## 2018-05-01 NOTE — ED ADULT NURSE NOTE - CHPI ED SYMPTOMS NEG
no agitation/no hallucinations/no weight loss/no confusion/no suicidal/no disorientation/no weakness/no change in level of consciousness/no paranoia/no homicidal

## 2018-05-01 NOTE — ED ADULT NURSE REASSESSMENT NOTE - CONDITION
unchanged/Pt has been seen by psychiatry and cleared for discharge. Behavior has been in control. Pt is dressed and  ready to leave

## 2018-05-01 NOTE — ED PROVIDER NOTE - PROGRESS NOTE DETAILS
spoke to psych LINWOOD Jensen and she states pt can come to  for further evaluation Dr Jordan cleared pt for discharge schizophrenia

## 2018-05-01 NOTE — ED ADULT NURSE NOTE - CAS EDN DISCHARGE ASSESSMENT
Awake/Alert and oriented to person, place and time/Pt alert and oriented x 3. He is anxious to d/c. Instructed  to not steal cigarettes and remain in behavior control. Pt encouraged to tale with his NP Diane Terrazas  to discuss his medications. Pt agreed. left in stable condition. Taxi called

## 2018-05-01 NOTE — ED BEHAVIORAL HEALTH ASSESSMENT NOTE - DESCRIPTION
Patient with flat affect. He was calm, cooperative.  He appeared forthcoming. He was not violent or aggressive    Vital Signs Last 24 Hrs  T(C): 36.9 (01 May 2018 15:00), Max: 37 (01 May 2018 13:04)  T(F): 98.4 (01 May 2018 15:00), Max: 98.6 (01 May 2018 13:04)  HR: 62 (01 May 2018 15:00) (62 - 85)  BP: 122/73 (01 May 2018 15:00) (117/76 - 122/73)  BP(mean): --  RR: 18 (01 May 2018 15:00) (17 - 18)  SpO2: 95% (01 May 2018 15:00) (94% - 95%) DM;HLD Single male lives in a Psychiatric residence, never  with no children. Reports he has learning disability. He did graduate from

## 2018-05-01 NOTE — ED PROVIDER NOTE - MEDICAL DECISION MAKING DETAILS
Paranoid schizophrenic, sent from group home. Pt appears anxious, denies complaints. Will check labs nd have BH evaluation and dispo as per recommendation
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
## 2018-05-01 NOTE — ED BEHAVIORAL HEALTH ASSESSMENT NOTE - RISK ASSESSMENT
Low-Pt denies any stresssors, has been compliant with medications, no recent psychiatric hospitalizations, No prior suicide attempt, denies any current S/H I/I/P, no psychotic or anxiety sx's elicited, denies global insomnia, remains future oriented, reports he plans on following up with [facility] appointment, currently connected with treatment.

## 2018-05-01 NOTE — ED BEHAVIORAL HEALTH ASSESSMENT NOTE - OTHER PAST PSYCHIATRIC HISTORY (INCLUDE DETAILS REGARDING ONSET, COURSE OF ILLNESS, INPATIENT/OUTPATIENT TREATMENT)
Patient reports three prior psychiatric hospitalizations, last in 1985 (two times in [place], first time in [place] at age 19). Reports prior h/o paranoia, Denies any h/o suicide attempts. Long h/o outpatient treatment
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
## 2018-05-01 NOTE — ED PROVIDER NOTE - CONSTITUTIONAL, MLM
normal... unkempt, well nourished, awake, alert, oriented to person, place, time/situation and in no apparent distress. appears anxious
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
## 2018-05-01 NOTE — ED BEHAVIORAL HEALTH ASSESSMENT NOTE - SUMMARY
Patient a 51 y/o male, unemployed, domiciled at [facility] for 15 months, no prior SA, hx of Schizophrenia, last hospitalized at [facility] in 1998 for 8 days for medical reason and he was lat hospitalized psychiatrically in 1985, denied aggression or violence or any self mutilation habits, medically has DM with HLD was BIB/Group home for Psychiatric Evaluation.  Patient has reportedly been stealing cigarettes and reportedly told staff he didn't feel right.  On interview, patient reports that he has "grubbed cigarettes" because he ran out of [employer] and used money to re apply for 's license last week.  He denies any other stressors and reports he feels fine and wants to go home.  He was not aggressive, agitated and did not appear to be responding to internal stimuli.  He appeared forthcoming on interview.  He denies depressed mood, psychotic sx's, sancho or any S/H I/I/P. He has been compliant with medications and denies any adverse effects. Collateral was obtained from Diane Chen NP who knows patient well and does not note patient to be suicidal/homicidal, or violent and has been compliant with medications.  Patient currently does not require inpatient psychiatric hospitalization. Will dc back to group home with follow up with Psychiatric NP.

## 2018-05-02 ENCOUNTER — INPATIENT (INPATIENT)
Facility: HOSPITAL | Age: 53
LOS: 2 days | Discharge: HOME | DRG: 191 | End: 2018-05-05
Attending: INTERNAL MEDICINE | Admitting: HOSPITALIST
Payer: MEDICARE

## 2018-05-02 VITALS
TEMPERATURE: 98 F | HEIGHT: 73 IN | DIASTOLIC BLOOD PRESSURE: 77 MMHG | HEART RATE: 90 BPM | SYSTOLIC BLOOD PRESSURE: 117 MMHG | WEIGHT: 199.08 LBS | OXYGEN SATURATION: 88 % | RESPIRATION RATE: 18 BRPM

## 2018-05-02 PROCEDURE — 99285 EMERGENCY DEPT VISIT HI MDM: CPT
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
## 2018-05-02 NOTE — ED PROVIDER NOTE - MEDICAL DECISION MAKING DETAILS
Pulse ox noted at 88% although pt is resting comfortably, will treat accordingly and consult psych, symptoms may be acute exacerbation of paranoid schizophrenia.

## 2018-05-02 NOTE — ED PROVIDER NOTE - CARE PLAN
Principal Discharge DX:	COPD (chronic obstructive pulmonary disease) Principal Discharge DX:	COPD (chronic obstructive pulmonary disease)  Secondary Diagnosis:	Hypoxia Principal Discharge DX:	COPD (chronic obstructive pulmonary disease)  Secondary Diagnosis:	Hypoxia  Secondary Diagnosis:	Schizophrenia

## 2018-05-02 NOTE — ED PROVIDER NOTE - PROGRESS NOTE DETAILS
Pulse ox noted at 88% although pt is resting comfortably, will treat accordingly. Patient resting comfortable, with scant intermittent wheezing. However, RA air pulse ox is noted. Patient will need admission for management of COPD exacerbation. Patient resting comfortable, with scant intermittent wheezing. However, RA air pulse ox is noted. Patient may need admission for management of COPD exacerbation. Pulse ox noted after several treatments. Patient stable. Will repeat in 1-2 hours, if stable patient medically cleared and can undergo pysch evaluation. Patient will need steroids on d/c Patient resting comfortable, with scant intermittent wheezing. However, RA air pulse ox is noted. Patient may need admission for management of COPD exacerbation. No episodes of agitation or aggressive behavior.

## 2018-05-02 NOTE — ED PROVIDER NOTE - OBJECTIVE STATEMENT
51 y/o male smoker (1 pack per day) with PMHx asthma, DM, paranoid schizophrenia, brain tumor (since age 15, same size) presents to the ED via EMS from [place] for evaluation of aggressive behavior. PT was sent from [facility] for evaluation of aggressive behavior. PT denies SI, HI, and any other acute symptoms and complaints at this time.

## 2018-05-03 DIAGNOSIS — J44.9 CHRONIC OBSTRUCTIVE PULMONARY DISEASE, UNSPECIFIED: ICD-10-CM

## 2018-05-03 LAB
ALBUMIN SERPL ELPH-MCNC: 4.2 G/DL — SIGNIFICANT CHANGE UP (ref 3.3–5.2)
ALP SERPL-CCNC: 74 U/L — SIGNIFICANT CHANGE UP (ref 40–120)
ALT FLD-CCNC: 9 U/L — SIGNIFICANT CHANGE UP
AMPHET UR-MCNC: NEGATIVE — SIGNIFICANT CHANGE UP
ANION GAP SERPL CALC-SCNC: 13 MMOL/L — SIGNIFICANT CHANGE UP (ref 5–17)
APAP SERPL-MCNC: <7.5 UG/ML — LOW (ref 10–26)
AST SERPL-CCNC: 16 U/L — SIGNIFICANT CHANGE UP
BARBITURATES UR SCN-MCNC: NEGATIVE — SIGNIFICANT CHANGE UP
BASOPHILS # BLD AUTO: 0 K/UL — SIGNIFICANT CHANGE UP (ref 0–0.2)
BASOPHILS NFR BLD AUTO: 0.3 % — SIGNIFICANT CHANGE UP (ref 0–2)
BENZODIAZ UR-MCNC: NEGATIVE — SIGNIFICANT CHANGE UP
BILIRUB SERPL-MCNC: 0.4 MG/DL — SIGNIFICANT CHANGE UP (ref 0.4–2)
BUN SERPL-MCNC: 12 MG/DL — SIGNIFICANT CHANGE UP (ref 8–20)
CALCIUM SERPL-MCNC: 9.4 MG/DL — SIGNIFICANT CHANGE UP (ref 8.6–10.2)
CHLORIDE SERPL-SCNC: 99 MMOL/L — SIGNIFICANT CHANGE UP (ref 98–107)
CO2 SERPL-SCNC: 29 MMOL/L — SIGNIFICANT CHANGE UP (ref 22–29)
COCAINE METAB.OTHER UR-MCNC: NEGATIVE — SIGNIFICANT CHANGE UP
CREAT SERPL-MCNC: 0.55 MG/DL — SIGNIFICANT CHANGE UP (ref 0.5–1.3)
EOSINOPHIL # BLD AUTO: 0.1 K/UL — SIGNIFICANT CHANGE UP (ref 0–0.5)
EOSINOPHIL NFR BLD AUTO: 2 % — SIGNIFICANT CHANGE UP (ref 0–5)
ETHANOL SERPL-MCNC: <10 MG/DL — SIGNIFICANT CHANGE UP
GLUCOSE BLDC GLUCOMTR-MCNC: 190 MG/DL — HIGH (ref 70–99)
GLUCOSE BLDC GLUCOMTR-MCNC: 350 MG/DL — HIGH (ref 70–99)
GLUCOSE BLDC GLUCOMTR-MCNC: 491 MG/DL — CRITICAL HIGH (ref 70–99)
GLUCOSE BLDC GLUCOMTR-MCNC: >530 MG/DL — CRITICAL HIGH (ref 70–99)
GLUCOSE SERPL-MCNC: 52 MG/DL — LOW (ref 70–115)
GLUCOSE SERPL-MCNC: 582 MG/DL — CRITICAL HIGH (ref 70–115)
HCT VFR BLD CALC: 47.3 % — SIGNIFICANT CHANGE UP (ref 42–52)
HGB BLD-MCNC: 15.7 G/DL — SIGNIFICANT CHANGE UP (ref 14–18)
LYMPHOCYTES # BLD AUTO: 1.8 K/UL — SIGNIFICANT CHANGE UP (ref 1–4.8)
LYMPHOCYTES # BLD AUTO: 25.5 % — SIGNIFICANT CHANGE UP (ref 20–55)
MCHC RBC-ENTMCNC: 29.5 PG — SIGNIFICANT CHANGE UP (ref 27–31)
MCHC RBC-ENTMCNC: 33.2 G/DL — SIGNIFICANT CHANGE UP (ref 32–36)
MCV RBC AUTO: 88.9 FL — SIGNIFICANT CHANGE UP (ref 80–94)
METHADONE UR-MCNC: NEGATIVE — SIGNIFICANT CHANGE UP
MONOCYTES # BLD AUTO: 0.8 K/UL — SIGNIFICANT CHANGE UP (ref 0–0.8)
MONOCYTES NFR BLD AUTO: 11.3 % — HIGH (ref 3–10)
NEUTROPHILS # BLD AUTO: 4.3 K/UL — SIGNIFICANT CHANGE UP (ref 1.8–8)
NEUTROPHILS NFR BLD AUTO: 60.8 % — SIGNIFICANT CHANGE UP (ref 37–73)
OPIATES UR-MCNC: NEGATIVE — SIGNIFICANT CHANGE UP
PCP SPEC-MCNC: SIGNIFICANT CHANGE UP
PCP UR-MCNC: NEGATIVE — SIGNIFICANT CHANGE UP
PLATELET # BLD AUTO: 143 K/UL — LOW (ref 150–400)
POTASSIUM SERPL-MCNC: 3.5 MMOL/L — SIGNIFICANT CHANGE UP (ref 3.5–5.3)
POTASSIUM SERPL-SCNC: 3.5 MMOL/L — SIGNIFICANT CHANGE UP (ref 3.5–5.3)
PROT SERPL-MCNC: 6.6 G/DL — SIGNIFICANT CHANGE UP (ref 6.6–8.7)
RBC # BLD: 5.32 M/UL — SIGNIFICANT CHANGE UP (ref 4.6–6.2)
RBC # FLD: 14.8 % — SIGNIFICANT CHANGE UP (ref 11–15.6)
SALICYLATES SERPL-MCNC: <0.6 MG/DL — LOW (ref 10–20)
SODIUM SERPL-SCNC: 141 MMOL/L — SIGNIFICANT CHANGE UP (ref 135–145)
THC UR QL: NEGATIVE — SIGNIFICANT CHANGE UP
WBC # BLD: 7.1 K/UL — SIGNIFICANT CHANGE UP (ref 4.8–10.8)
WBC # FLD AUTO: 7.1 K/UL — SIGNIFICANT CHANGE UP (ref 4.8–10.8)

## 2018-05-03 PROCEDURE — 71045 X-RAY EXAM CHEST 1 VIEW: CPT | Mod: 26

## 2018-05-03 PROCEDURE — 99222 1ST HOSP IP/OBS MODERATE 55: CPT

## 2018-05-03 PROCEDURE — 93010 ELECTROCARDIOGRAM REPORT: CPT

## 2018-05-03 PROCEDURE — 90792 PSYCH DIAG EVAL W/MED SRVCS: CPT

## 2018-05-03 RX ORDER — CLONAZEPAM 1 MG
2 TABLET ORAL THREE TIMES A DAY
Qty: 0 | Refills: 0 | Status: DISCONTINUED | OUTPATIENT
Start: 2018-05-03 | End: 2018-05-05

## 2018-05-03 RX ORDER — THIAMINE MONONITRATE (VIT B1) 100 MG
100 TABLET ORAL DAILY
Qty: 0 | Refills: 0 | Status: DISCONTINUED | OUTPATIENT
Start: 2018-05-03 | End: 2018-05-05

## 2018-05-03 RX ORDER — QUETIAPINE FUMARATE 200 MG/1
300 TABLET, FILM COATED ORAL DAILY
Qty: 0 | Refills: 0 | Status: DISCONTINUED | OUTPATIENT
Start: 2018-05-03 | End: 2018-05-05

## 2018-05-03 RX ORDER — MAGNESIUM SULFATE 500 MG/ML
2 VIAL (ML) INJECTION ONCE
Qty: 0 | Refills: 0 | Status: COMPLETED | OUTPATIENT
Start: 2018-05-03 | End: 2018-05-03

## 2018-05-03 RX ORDER — DOCUSATE SODIUM 100 MG
100 CAPSULE ORAL DAILY
Qty: 0 | Refills: 0 | Status: DISCONTINUED | OUTPATIENT
Start: 2018-05-03 | End: 2018-05-05

## 2018-05-03 RX ORDER — LATANOPROST 0.05 MG/ML
1 SOLUTION/ DROPS OPHTHALMIC; TOPICAL AT BEDTIME
Qty: 0 | Refills: 0 | Status: DISCONTINUED | OUTPATIENT
Start: 2018-05-03 | End: 2018-05-05

## 2018-05-03 RX ORDER — IPRATROPIUM/ALBUTEROL SULFATE 18-103MCG
3 AEROSOL WITH ADAPTER (GRAM) INHALATION ONCE
Qty: 0 | Refills: 0 | Status: COMPLETED | OUTPATIENT
Start: 2018-05-03 | End: 2018-05-03

## 2018-05-03 RX ORDER — ALBUTEROL 90 UG/1
2.5 AEROSOL, METERED ORAL
Qty: 0 | Refills: 0 | Status: COMPLETED | OUTPATIENT
Start: 2018-05-03 | End: 2018-05-03

## 2018-05-03 RX ORDER — THEOPHYLLINE ANHYDROUS 200 MG
200 TABLET, EXTENDED RELEASE 12 HR ORAL EVERY 8 HOURS
Qty: 0 | Refills: 0 | Status: DISCONTINUED | OUTPATIENT
Start: 2018-05-03 | End: 2018-05-03

## 2018-05-03 RX ORDER — IPRATROPIUM/ALBUTEROL SULFATE 18-103MCG
3 AEROSOL WITH ADAPTER (GRAM) INHALATION EVERY 6 HOURS
Qty: 0 | Refills: 0 | Status: DISCONTINUED | OUTPATIENT
Start: 2018-05-03 | End: 2018-05-05

## 2018-05-03 RX ORDER — SODIUM CHLORIDE 9 MG/ML
1000 INJECTION, SOLUTION INTRAVENOUS
Qty: 0 | Refills: 0 | Status: DISCONTINUED | OUTPATIENT
Start: 2018-05-03 | End: 2018-05-05

## 2018-05-03 RX ORDER — DEXTROSE 50 % IN WATER 50 %
12.5 SYRINGE (ML) INTRAVENOUS ONCE
Qty: 0 | Refills: 0 | Status: DISCONTINUED | OUTPATIENT
Start: 2018-05-03 | End: 2018-05-05

## 2018-05-03 RX ORDER — MONTELUKAST 4 MG/1
10 TABLET, CHEWABLE ORAL DAILY
Qty: 0 | Refills: 0 | Status: DISCONTINUED | OUTPATIENT
Start: 2018-05-03 | End: 2018-05-05

## 2018-05-03 RX ORDER — ATORVASTATIN CALCIUM 80 MG/1
40 TABLET, FILM COATED ORAL AT BEDTIME
Qty: 0 | Refills: 0 | Status: DISCONTINUED | OUTPATIENT
Start: 2018-05-03 | End: 2018-05-05

## 2018-05-03 RX ORDER — INSULIN LISPRO 100/ML
6 VIAL (ML) SUBCUTANEOUS ONCE
Qty: 0 | Refills: 0 | Status: COMPLETED | OUTPATIENT
Start: 2018-05-03 | End: 2018-05-04

## 2018-05-03 RX ORDER — PANTOPRAZOLE SODIUM 20 MG/1
40 TABLET, DELAYED RELEASE ORAL
Qty: 0 | Refills: 0 | Status: DISCONTINUED | OUTPATIENT
Start: 2018-05-03 | End: 2018-05-05

## 2018-05-03 RX ORDER — ASPIRIN/CALCIUM CARB/MAGNESIUM 324 MG
81 TABLET ORAL DAILY
Qty: 0 | Refills: 0 | Status: DISCONTINUED | OUTPATIENT
Start: 2018-05-03 | End: 2018-05-05

## 2018-05-03 RX ORDER — DEXTROSE 50 % IN WATER 50 %
25 SYRINGE (ML) INTRAVENOUS ONCE
Qty: 0 | Refills: 0 | Status: DISCONTINUED | OUTPATIENT
Start: 2018-05-03 | End: 2018-05-05

## 2018-05-03 RX ORDER — INSULIN LISPRO 100/ML
VIAL (ML) SUBCUTANEOUS
Qty: 0 | Refills: 0 | Status: DISCONTINUED | OUTPATIENT
Start: 2018-05-03 | End: 2018-05-04

## 2018-05-03 RX ORDER — ENOXAPARIN SODIUM 100 MG/ML
40 INJECTION SUBCUTANEOUS EVERY 24 HOURS
Qty: 0 | Refills: 0 | Status: DISCONTINUED | OUTPATIENT
Start: 2018-05-03 | End: 2018-05-05

## 2018-05-03 RX ORDER — THEOPHYLLINE ANHYDROUS 200 MG
600 TABLET, EXTENDED RELEASE 12 HR ORAL DAILY
Qty: 0 | Refills: 0 | Status: DISCONTINUED | OUTPATIENT
Start: 2018-05-03 | End: 2018-05-03

## 2018-05-03 RX ORDER — ZIPRASIDONE HYDROCHLORIDE 20 MG/1
20 CAPSULE ORAL
Qty: 0 | Refills: 0 | Status: DISCONTINUED | OUTPATIENT
Start: 2018-05-03 | End: 2018-05-05

## 2018-05-03 RX ORDER — NICOTINE POLACRILEX 2 MG
1 GUM BUCCAL DAILY
Qty: 0 | Refills: 0 | Status: DISCONTINUED | OUTPATIENT
Start: 2018-05-03 | End: 2018-05-05

## 2018-05-03 RX ORDER — HALOPERIDOL DECANOATE 100 MG/ML
20 INJECTION INTRAMUSCULAR
Qty: 0 | Refills: 0 | Status: DISCONTINUED | OUTPATIENT
Start: 2018-05-03 | End: 2018-05-05

## 2018-05-03 RX ORDER — DEXTROSE 50 % IN WATER 50 %
1 SYRINGE (ML) INTRAVENOUS ONCE
Qty: 0 | Refills: 0 | Status: DISCONTINUED | OUTPATIENT
Start: 2018-05-03 | End: 2018-05-05

## 2018-05-03 RX ORDER — THEOPHYLLINE ANHYDROUS 200 MG
200 TABLET, EXTENDED RELEASE 12 HR ORAL EVERY 8 HOURS
Qty: 0 | Refills: 0 | Status: DISCONTINUED | OUTPATIENT
Start: 2018-05-03 | End: 2018-05-05

## 2018-05-03 RX ORDER — INSULIN GLARGINE 100 [IU]/ML
30 INJECTION, SOLUTION SUBCUTANEOUS
Qty: 0 | Refills: 0 | Status: DISCONTINUED | OUTPATIENT
Start: 2018-05-03 | End: 2018-05-05

## 2018-05-03 RX ORDER — GLUCAGON INJECTION, SOLUTION 0.5 MG/.1ML
1 INJECTION, SOLUTION SUBCUTANEOUS ONCE
Qty: 0 | Refills: 0 | Status: DISCONTINUED | OUTPATIENT
Start: 2018-05-03 | End: 2018-05-05

## 2018-05-03 RX ORDER — BENZTROPINE MESYLATE 1 MG
1 TABLET ORAL THREE TIMES A DAY
Qty: 0 | Refills: 0 | Status: DISCONTINUED | OUTPATIENT
Start: 2018-05-03 | End: 2018-05-05

## 2018-05-03 RX ORDER — MAGNESIUM OXIDE 400 MG ORAL TABLET 241.3 MG
400 TABLET ORAL DAILY
Qty: 0 | Refills: 0 | Status: DISCONTINUED | OUTPATIENT
Start: 2018-05-03 | End: 2018-05-05

## 2018-05-03 RX ADMIN — Medication 2 MILLIGRAM(S): at 13:39

## 2018-05-03 RX ADMIN — ATORVASTATIN CALCIUM 40 MILLIGRAM(S): 80 TABLET, FILM COATED ORAL at 23:04

## 2018-05-03 RX ADMIN — ALBUTEROL 2.5 MILLIGRAM(S): 90 AEROSOL, METERED ORAL at 06:07

## 2018-05-03 RX ADMIN — Medication 100 MILLIGRAM(S): at 13:04

## 2018-05-03 RX ADMIN — ENOXAPARIN SODIUM 40 MILLIGRAM(S): 100 INJECTION SUBCUTANEOUS at 12:52

## 2018-05-03 RX ADMIN — HALOPERIDOL DECANOATE 20 MILLIGRAM(S): 100 INJECTION INTRAMUSCULAR at 19:09

## 2018-05-03 RX ADMIN — PANTOPRAZOLE SODIUM 40 MILLIGRAM(S): 20 TABLET, DELAYED RELEASE ORAL at 10:14

## 2018-05-03 RX ADMIN — Medication 1 MILLIGRAM(S): at 23:04

## 2018-05-03 RX ADMIN — Medication 3 MILLILITER(S): at 20:04

## 2018-05-03 RX ADMIN — Medication 60 MILLIGRAM(S): at 01:38

## 2018-05-03 RX ADMIN — Medication 50 GRAM(S): at 06:07

## 2018-05-03 RX ADMIN — Medication 2 MILLIGRAM(S): at 23:05

## 2018-05-03 RX ADMIN — Medication 125 MILLIGRAM(S): at 08:36

## 2018-05-03 RX ADMIN — Medication 40 MILLIGRAM(S): at 13:05

## 2018-05-03 RX ADMIN — Medication 6: at 17:42

## 2018-05-03 RX ADMIN — Medication 3 MILLILITER(S): at 16:26

## 2018-05-03 RX ADMIN — Medication 81 MILLIGRAM(S): at 12:51

## 2018-05-03 RX ADMIN — Medication 200 MILLIGRAM(S): at 13:39

## 2018-05-03 RX ADMIN — ALBUTEROL 2.5 MILLIGRAM(S): 90 AEROSOL, METERED ORAL at 07:46

## 2018-05-03 RX ADMIN — LATANOPROST 1 DROP(S): 0.05 SOLUTION/ DROPS OPHTHALMIC; TOPICAL at 23:05

## 2018-05-03 RX ADMIN — Medication 3 MILLILITER(S): at 01:03

## 2018-05-03 RX ADMIN — Medication 4: at 12:47

## 2018-05-03 RX ADMIN — Medication 100 MILLIGRAM(S): at 12:50

## 2018-05-03 RX ADMIN — ZIPRASIDONE HYDROCHLORIDE 20 MILLIGRAM(S): 20 CAPSULE ORAL at 18:44

## 2018-05-03 RX ADMIN — MAGNESIUM OXIDE 400 MG ORAL TABLET 400 MILLIGRAM(S): 241.3 TABLET ORAL at 12:50

## 2018-05-03 RX ADMIN — Medication 40 MILLIGRAM(S): at 23:05

## 2018-05-03 RX ADMIN — QUETIAPINE FUMARATE 300 MILLIGRAM(S): 200 TABLET, FILM COATED ORAL at 23:05

## 2018-05-03 RX ADMIN — Medication 1 MILLIGRAM(S): at 13:39

## 2018-05-03 RX ADMIN — MONTELUKAST 10 MILLIGRAM(S): 4 TABLET, CHEWABLE ORAL at 12:49

## 2018-05-03 NOTE — BEHAVIORAL HEALTH ASSESSMENT NOTE - NSBHCHARTREVIEWVS_PSY_A_CORE FT
Vital Signs Last 24 Hrs  T(C): 37 (03 May 2018 11:12), Max: 37 (03 May 2018 11:12)  T(F): 98.6 (03 May 2018 11:12), Max: 98.6 (03 May 2018 11:12)  HR: 89 (03 May 2018 16:27) (68 - 94)  BP: 126/79 (03 May 2018 11:12) (104/68 - 132/79)  BP(mean): --  RR: 18 (03 May 2018 11:12) (18 - 19)  SpO2: 91% (03 May 2018 11:12) (84% - 97%)

## 2018-05-03 NOTE — ED ADULT NURSE NOTE - OBJECTIVE STATEMENT
per EMS pt sent for aggressive behavior,  per pt he was in the ER " I was bumming cigarettes off the streets.

## 2018-05-03 NOTE — ED ADULT NURSE REASSESSMENT NOTE - NS ED NURSE REASSESS COMMENT FT1
Pt placed on 2L O2.
Patient is alert and verbal. Denies pain and discomfort. patient O2 sat=88 on MD CANDY made aware. Neb tx in progress. Will continue to monitor.
Pt reassessed.  Found sleeping.  Easily arousable.  Pt denies physical complaints.  Pt found in yellow gown. Pt calm and cooperative at this time.

## 2018-05-03 NOTE — BEHAVIORAL HEALTH ASSESSMENT NOTE - RISK ASSESSMENT
Chronic risk due to hx of schizophrenia and chronic passive suicidal ideation, age and status as  male. Acute risk due to recent agitation and acute medical issue. However patient has been compliant with medications, no recent psychiatric hospitalizations, No prior suicide attempt, denies any current or past  suicidal plan or intent, denies homicidal ideation ,  not abusing illicit substances, no psychotic symptoms elicited  denies global insomnia, remains future oriented, reports he plans on following up with vocational goals and medical follow up of his brain tumor, currently connected with treatment.  Patient assessed to not be at imminent risk of harm to self or others

## 2018-05-03 NOTE — H&P ADULT - HISTORY OF PRESENT ILLNESS
53 y/o male smoker (1 pack per day) with PMHx of HTN,Depression, gastritis, asthma, DM, paranoid schizophrenia, brain tumor (since age 15, same size) presents to the ED via EMS from [place] for evaluation of aggressive behavior. PT was sent from [facility] for evaluation of aggressive behavior. Pt was threating others and was apparently stealing cigarettes. Pt had dysnea and was hypoxic in ER wiht pulse ox - 84.  Pt was given methyl prednisone, mg and albuterol in ER, Pt's symtoms improved.  Pt denies chest pain, headache, N/VD, Pt denies suicidal or homicidal ideation

## 2018-05-03 NOTE — H&P ADULT - ASSESSMENT
53 y/o male smoker (1 pack per day) with PMHx of HTN,Depression, gastritis, asthma, DM, paranoid schizophrenia, brain tumor (since age 15, same size) presents to the ED via EMS from [place] for evaluation of aggressive behavior. PT was sent from residential for evaluation of aggressive behavior. Pt was threating others and was apparently stealing cigarettes. Pt had dysnea and was hypoxic in ER wiht pulse ox - 84.  Pt was given methyl prednisone, mg and albuterol in ER, Pt's symtoms improved.  Pt denies chest pain, headache, N/VD, Pt denies suicidal or homicidal ideation     1- COPD exacerbration  C/W albuterol,   levofloxacin  c/w singular  albuterol, theophyline  CXR-P  C/W Methylprednisone 60mg IV TID  Monitor pulse oxygen    2- Aggressive behaviour/Schizophrenia  c/w haldol, cogentin, seroquel, klonopin  Psych consult called    3-DM  RISS, check FS  C/W LANTUS 30 unit bid    4- hx of Gastritis  c/w PPI    5- Smoker  nicotine patch and smoking cessation education    DVT prophylaxis- SQ lovenox

## 2018-05-03 NOTE — ED ADULT NURSE NOTE - CHIEF COMPLAINT QUOTE
Pt sent from [facility]. Denies SI/HI. As per EMS staff reports him making threats of harm towards others. Dr. Flores called for a pysch eval and pt placed in yellow gown.

## 2018-05-03 NOTE — H&P ADULT - NSHPPHYSICALEXAM_GEN_ALL_CORE
HYSICAL EXAM:   · CONSTITUTIONAL: Well appearing, well nourished, awake, alert, oriented to person, place, time/situation and in no apparent distress.  · CARDIAC: Normal rate, regular rhythm.  Heart sounds S1, S2.  No murmurs, rubs or gallops.  · RESPIRATORY: Scant wheezing to left lung fields, course breath sounds, No respiratory distress.  · GASTROINTESTINAL: Abdomen soft, non-tender, no guarding.  · MUSCULOSKELETAL: Spine appears normal, range of motion is not limited, no muscle or joint tenderness  · NEUROLOGICAL: Alert and oriented, no focal deficits, no motor or sensory deficits.  · SKIN: Skin normal color for race, warm, dry and intact. No evidence of rash.  · PSYCHIATRIC: Alert and oriented, flat affect.

## 2018-05-03 NOTE — BEHAVIORAL HEALTH ASSESSMENT NOTE - HPI (INCLUDE ILLNESS QUALITY, SEVERITY, DURATION, TIMING, CONTEXT, MODIFYING FACTORS, ASSOCIATED SIGNS AND SYMPTOMS)
Patient a 53 y/o male, unemployed, domiciled at [facility] for 15 months, no prior SA, hx of Schizophrenia, last hospitalized at [facility] in  for 8 days for medical reason and he was lat hospitalized psychiatrically in , denied aggression or violence or any self mutilation habits, medically has DM with HLD was [facility]/Group home for Psychiatric Evaluation.     Contacted [facility] who states patient was arguinign with residents trying to break into their rooms and break their things, states that patient had altered mental status. Staff states that patient has been doing this for the past 2 weeks, staff states patient has not been physically aggressive, but has been yelling and irritable , and only stops for brief periods. Staff states patient is always looking for cigarettes even when he has his own. Staff state prior to 2 weeks ago, patient was calm quiet, going to gym and riding bikes, and attending the [employer] social program, now longer is going to these activities. Staff states has not slept well for the past 2 nights. Staff reports patient has been compliant with medications. Staff denies that patient has appeared medically ill recently.    Patient reports he was sent here because he was acting out, banging on doors trying to “grub” cigarettes from other residents. He states the money he receives is not enough to support his cigarette requirement, so he tries to steal them from other residents, and has been doing this for a long time.  He states he has felt passively suicidal  for years, with the thought that if he  he would not care, however denies suicidal plan or intent, however states he knows smoking cigarettes will shorten his life and he is ok with that, states he is disappointed by not being able to get a job and that his family members don’t call him anymore. He is not interested in smoking cessation therapy. He states he does not want to be in the hospital because the weather will be nice this weekend and he wants to ride his bike, it is one of the few things he still enjoys. He denies paranoia AH, VH , homicidal ideation and sleep disturbance.

## 2018-05-04 LAB
ALBUMIN SERPL ELPH-MCNC: 4.1 G/DL — SIGNIFICANT CHANGE UP (ref 3.3–5.2)
ALP SERPL-CCNC: 78 U/L — SIGNIFICANT CHANGE UP (ref 40–120)
ALT FLD-CCNC: 11 U/L — SIGNIFICANT CHANGE UP
ANION GAP SERPL CALC-SCNC: 13 MMOL/L — SIGNIFICANT CHANGE UP (ref 5–17)
AST SERPL-CCNC: 14 U/L — SIGNIFICANT CHANGE UP
BILIRUB SERPL-MCNC: 0.5 MG/DL — SIGNIFICANT CHANGE UP (ref 0.4–2)
BUN SERPL-MCNC: 23 MG/DL — HIGH (ref 8–20)
CALCIUM SERPL-MCNC: 9.2 MG/DL — SIGNIFICANT CHANGE UP (ref 8.6–10.2)
CHLORIDE SERPL-SCNC: 94 MMOL/L — LOW (ref 98–107)
CO2 SERPL-SCNC: 28 MMOL/L — SIGNIFICANT CHANGE UP (ref 22–29)
CREAT SERPL-MCNC: 0.63 MG/DL — SIGNIFICANT CHANGE UP (ref 0.5–1.3)
GLUCOSE BLDC GLUCOMTR-MCNC: 349 MG/DL — HIGH (ref 70–99)
GLUCOSE BLDC GLUCOMTR-MCNC: 378 MG/DL — HIGH (ref 70–99)
GLUCOSE BLDC GLUCOMTR-MCNC: 396 MG/DL — HIGH (ref 70–99)
GLUCOSE BLDC GLUCOMTR-MCNC: 416 MG/DL — HIGH (ref 70–99)
GLUCOSE BLDC GLUCOMTR-MCNC: >530 MG/DL — CRITICAL HIGH (ref 70–99)
GLUCOSE SERPL-MCNC: 374 MG/DL — HIGH (ref 70–115)
HBA1C BLD-MCNC: 7.2 % — HIGH (ref 4–5.6)
HCT VFR BLD CALC: 46 % — SIGNIFICANT CHANGE UP (ref 42–52)
HGB BLD-MCNC: 15.2 G/DL — SIGNIFICANT CHANGE UP (ref 14–18)
MCHC RBC-ENTMCNC: 28.7 PG — SIGNIFICANT CHANGE UP (ref 27–31)
MCHC RBC-ENTMCNC: 33 G/DL — SIGNIFICANT CHANGE UP (ref 32–36)
MCV RBC AUTO: 86.8 FL — SIGNIFICANT CHANGE UP (ref 80–94)
PLATELET # BLD AUTO: 165 K/UL — SIGNIFICANT CHANGE UP (ref 150–400)
POTASSIUM SERPL-MCNC: 5 MMOL/L — SIGNIFICANT CHANGE UP (ref 3.5–5.3)
POTASSIUM SERPL-SCNC: 5 MMOL/L — SIGNIFICANT CHANGE UP (ref 3.5–5.3)
PROT SERPL-MCNC: 6.5 G/DL — LOW (ref 6.6–8.7)
RBC # BLD: 5.3 M/UL — SIGNIFICANT CHANGE UP (ref 4.6–6.2)
RBC # FLD: 15 % — SIGNIFICANT CHANGE UP (ref 11–15.6)
SODIUM SERPL-SCNC: 135 MMOL/L — SIGNIFICANT CHANGE UP (ref 135–145)
WBC # BLD: 8.7 K/UL — SIGNIFICANT CHANGE UP (ref 4.8–10.8)
WBC # FLD AUTO: 8.7 K/UL — SIGNIFICANT CHANGE UP (ref 4.8–10.8)

## 2018-05-04 PROCEDURE — 99232 SBSQ HOSP IP/OBS MODERATE 35: CPT

## 2018-05-04 PROCEDURE — 99233 SBSQ HOSP IP/OBS HIGH 50: CPT

## 2018-05-04 RX ORDER — SODIUM CHLORIDE 9 MG/ML
1000 INJECTION, SOLUTION INTRAVENOUS
Qty: 0 | Refills: 0 | Status: DISCONTINUED | OUTPATIENT
Start: 2018-05-04 | End: 2018-05-05

## 2018-05-04 RX ORDER — INSULIN LISPRO 100/ML
5 VIAL (ML) SUBCUTANEOUS
Qty: 0 | Refills: 0 | Status: DISCONTINUED | OUTPATIENT
Start: 2018-05-04 | End: 2018-05-05

## 2018-05-04 RX ORDER — INSULIN LISPRO 100/ML
VIAL (ML) SUBCUTANEOUS
Qty: 0 | Refills: 0 | Status: DISCONTINUED | OUTPATIENT
Start: 2018-05-04 | End: 2018-05-05

## 2018-05-04 RX ORDER — INSULIN LISPRO 100/ML
VIAL (ML) SUBCUTANEOUS AT BEDTIME
Qty: 0 | Refills: 0 | Status: DISCONTINUED | OUTPATIENT
Start: 2018-05-04 | End: 2018-05-05

## 2018-05-04 RX ADMIN — Medication 3 MILLILITER(S): at 09:22

## 2018-05-04 RX ADMIN — Medication 81 MILLIGRAM(S): at 12:08

## 2018-05-04 RX ADMIN — HALOPERIDOL DECANOATE 20 MILLIGRAM(S): 100 INJECTION INTRAMUSCULAR at 17:37

## 2018-05-04 RX ADMIN — Medication 40 MILLIGRAM(S): at 17:36

## 2018-05-04 RX ADMIN — Medication 200 MILLIGRAM(S): at 05:58

## 2018-05-04 RX ADMIN — SODIUM CHLORIDE 75 MILLILITER(S): 9 INJECTION, SOLUTION INTRAVENOUS at 14:38

## 2018-05-04 RX ADMIN — PANTOPRAZOLE SODIUM 40 MILLIGRAM(S): 20 TABLET, DELAYED RELEASE ORAL at 05:58

## 2018-05-04 RX ADMIN — MONTELUKAST 10 MILLIGRAM(S): 4 TABLET, CHEWABLE ORAL at 12:09

## 2018-05-04 RX ADMIN — ZIPRASIDONE HYDROCHLORIDE 20 MILLIGRAM(S): 20 CAPSULE ORAL at 05:58

## 2018-05-04 RX ADMIN — Medication 2 MILLIGRAM(S): at 05:58

## 2018-05-04 RX ADMIN — ZIPRASIDONE HYDROCHLORIDE 20 MILLIGRAM(S): 20 CAPSULE ORAL at 17:38

## 2018-05-04 RX ADMIN — MAGNESIUM OXIDE 400 MG ORAL TABLET 400 MILLIGRAM(S): 241.3 TABLET ORAL at 12:08

## 2018-05-04 RX ADMIN — Medication 200 MILLIGRAM(S): at 23:10

## 2018-05-04 RX ADMIN — Medication 1 MILLIGRAM(S): at 23:09

## 2018-05-04 RX ADMIN — Medication 10: at 17:36

## 2018-05-04 RX ADMIN — Medication 2 MILLIGRAM(S): at 14:35

## 2018-05-04 RX ADMIN — Medication 200 MILLIGRAM(S): at 14:36

## 2018-05-04 RX ADMIN — Medication 1 MILLIGRAM(S): at 05:58

## 2018-05-04 RX ADMIN — Medication 5 UNIT(S): at 17:35

## 2018-05-04 RX ADMIN — ATORVASTATIN CALCIUM 40 MILLIGRAM(S): 80 TABLET, FILM COATED ORAL at 23:09

## 2018-05-04 RX ADMIN — Medication 6 UNIT(S): at 00:06

## 2018-05-04 RX ADMIN — Medication 40 MILLIGRAM(S): at 05:58

## 2018-05-04 RX ADMIN — Medication 3 MILLILITER(S): at 21:51

## 2018-05-04 RX ADMIN — SODIUM CHLORIDE 75 MILLILITER(S): 9 INJECTION, SOLUTION INTRAVENOUS at 01:21

## 2018-05-04 RX ADMIN — INSULIN GLARGINE 30 UNIT(S): 100 INJECTION, SOLUTION SUBCUTANEOUS at 23:36

## 2018-05-04 RX ADMIN — Medication 6: at 12:15

## 2018-05-04 RX ADMIN — Medication 2 MILLIGRAM(S): at 23:32

## 2018-05-04 RX ADMIN — Medication 1 PATCH: at 12:09

## 2018-05-04 RX ADMIN — INSULIN GLARGINE 30 UNIT(S): 100 INJECTION, SOLUTION SUBCUTANEOUS at 09:18

## 2018-05-04 RX ADMIN — Medication 100 MILLIGRAM(S): at 12:04

## 2018-05-04 RX ADMIN — Medication 100 MILLIGRAM(S): at 12:08

## 2018-05-04 RX ADMIN — Medication 1 MILLIGRAM(S): at 14:35

## 2018-05-04 RX ADMIN — LATANOPROST 1 DROP(S): 0.05 SOLUTION/ DROPS OPHTHALMIC; TOPICAL at 23:09

## 2018-05-04 RX ADMIN — Medication 4: at 08:36

## 2018-05-04 RX ADMIN — Medication 6: at 23:32

## 2018-05-04 RX ADMIN — QUETIAPINE FUMARATE 300 MILLIGRAM(S): 200 TABLET, FILM COATED ORAL at 12:16

## 2018-05-04 RX ADMIN — HALOPERIDOL DECANOATE 20 MILLIGRAM(S): 100 INJECTION INTRAMUSCULAR at 06:23

## 2018-05-04 RX ADMIN — INSULIN GLARGINE 30 UNIT(S): 100 INJECTION, SOLUTION SUBCUTANEOUS at 00:01

## 2018-05-04 RX ADMIN — Medication 3 MILLILITER(S): at 15:41

## 2018-05-04 RX ADMIN — ENOXAPARIN SODIUM 40 MILLIGRAM(S): 100 INJECTION SUBCUTANEOUS at 11:44

## 2018-05-04 RX ADMIN — Medication 3 MILLILITER(S): at 03:59

## 2018-05-04 NOTE — PROGRESS NOTE ADULT - SUBJECTIVE AND OBJECTIVE BOX
Internal Medicine Hospitalist - Dr. Portia ZAMORA    [id number]    52y      Male    Patient is a 52y old  Male who presents with a chief complaint of altered mental status, hypoxia (03 May 2018 09:26)    INTERVAL HPI/ OVERNIGHT EVENTS: Patient is seen and examined, feeling better, improving SOB, on additional oxygen through NC    REVIEW OF SYSTEMS:    Denied fever, chills, abd. pain, nausea, vomiting, chest pain,  headache, dizziness    PHYSICAL EXAM:    Vital Signs Last 24 Hrs  T(C): 36.7 (04 May 2018 04:57), Max: 36.7 (03 May 2018 19:06)  T(F): 98 (04 May 2018 04:57), Max: 98 (03 May 2018 19:06)  HR: 92 (04 May 2018 09:24) (85 - 110)  BP: 114/68 (04 May 2018 04:57) (110/60 - 123/85)  BP(mean): --  RR: 19 (04 May 2018 04:57) (18 - 20)  SpO2: 98% (04 May 2018 09:24) (92% - 98%)    GENERAL: NAD  HEENT: EOMI  Neck: supple  CHEST/LUNG: improving air entry, occasional wheezing   HEART: S1S2+ audible  ABDOMEN: Soft, Nontender, Nondistended; Bowel sounds present  EXTREMITIES:  no edema  CNS: AAO X 3  Psychiatry: normal mood    LABS:                        15.2   8.7   )-----------( 165      ( 04 May 2018 06:07 )             46.0     05-04    135  |  94<L>  |  23.0<H>  ----------------------------<  374<H>  5.0   |  28.0  |  0.63    Ca    9.2      04 May 2018 06:07    TPro  6.5<L>  /  Alb  4.1  /  TBili  0.5  /  DBili  x   /  AST  14  /  ALT  11  /  AlkPhos  78  05-04            MEDICATIONS  (STANDING):  ALBUTerol/ipratropium for Nebulization 3 milliLiter(s) Nebulizer every 6 hours  aspirin enteric coated 81 milliGRAM(s) Oral daily  atorvastatin 40 milliGRAM(s) Oral at bedtime  benztropine 1 milliGRAM(s) Oral three times a day  clonazePAM Tablet 2 milliGRAM(s) Oral three times a day  dextrose 5%. 1000 milliLiter(s) (50 mL/Hr) IV Continuous <Continuous>  dextrose 50% Injectable 12.5 Gram(s) IV Push once  dextrose 50% Injectable 25 Gram(s) IV Push once  dextrose 50% Injectable 25 Gram(s) IV Push once  docusate sodium 100 milliGRAM(s) Oral daily  enoxaparin Injectable 40 milliGRAM(s) SubCutaneous every 24 hours  haloperidol     Tablet 20 milliGRAM(s) Oral two times a day  insulin glargine Injectable (LANTUS) 30 Unit(s) SubCutaneous two times a day  insulin lispro (HumaLOG) corrective regimen sliding scale   SubCutaneous three times a day before meals  latanoprost 0.005% Ophthalmic Solution 1 Drop(s) Both EYES at bedtime  levoFLOXacin IVPB      levoFLOXacin IVPB 500 milliGRAM(s) IV Intermittent every 24 hours  magnesium oxide 400 milliGRAM(s) Oral daily  methylPREDNISolone sodium succinate Injectable 40 milliGRAM(s) IV Push two times a day  montelukast 10 milliGRAM(s) Oral daily  nicotine -  14 mG/24Hr(s) Patch 1 patch Transdermal daily  pantoprazole    Tablet 40 milliGRAM(s) Oral before breakfast  QUEtiapine 300 milliGRAM(s) Oral daily  sodium chloride 0.45%. 1000 milliLiter(s) (75 mL/Hr) IV Continuous <Continuous>  theophylline Elixir 200 milliGRAM(s) Oral every 8 hours  thiamine 100 milliGRAM(s) Oral daily  ziprasidone 20 milliGRAM(s) Oral two times a day    MEDICATIONS  (PRN):  dextrose Gel 1 Dose(s) Oral once PRN Blood Glucose LESS THAN 70 milliGRAM(s)/deciliter  glucagon  Injectable 1 milliGRAM(s) IntraMuscular once PRN Glucose LESS THAN 70 milligrams/deciliter      RADIOLOGY & ADDITIONAL TEST

## 2018-05-04 NOTE — PROGRESS NOTE ADULT - ASSESSMENT
This is 51 y/o male smoker (1 pack per day) with PMHx of HTN, Depression, gastritis, asthma, DM, paranoid schizophrenia, brain tumor (since age 15, same size) presents to the ED via EMS from [place] for evaluation of aggressive behavior.  Also report SOB, admitted for COPD exacerbation    A/P    1- COPD exacerbation - improving   C/W albuterol,   levofloxacin  c/w singular  albuterol, theophyline  CXR-P  start tapering solumedrol   try to taper off oxygen    2- Aggressive behaviour/Schizophrenia  Psych consult appreciated   c/w haldol, cogentin, Seroquel,  Klonopin    3-DM - uncontrolled due to steroid   RISS, check FS  C/W LANTUS 30 unit bid    4- hx of Gastritis  c/w PPI    5- Smoker  nicotine patch and smoking cessation education    DVT prophylaxis- SQ lovenox This is 51 y/o male smoker (1 pack per day) with PMHx of HTN, Depression, gastritis, asthma, DM, paranoid schizophrenia, brain tumor (since age 15, same size) presents to the ED via EMS from [place] for evaluation of aggressive behavior.  Also report SOB, admitted for COPD exacerbation    A/P    1- COPD exacerbation - improving   C/W albuterol,   levofloxacin  c/w singular  albuterol, theophyline  CXR-P  start tapering solumedrol   try to taper off oxygen    2- Schizophrenia  Psych consult appreciated   c/w haldol, cogentin, Seroquel,  Klonopin    3-DM - uncontrolled due to steroid   C/W LANTUS 30 unit bid  start Humalog 5 unit tid, increase sliding scale to moderate     4- hx of Gastritis  c/w PPI    5- Smoker  nicotine patch and smoking cessation education    DVT prophylaxis- SQ lovenox

## 2018-05-04 NOTE — CHART NOTE - NSCHARTNOTEFT_GEN_A_CORE
Called by RN with a ; Pt admitted for COPD exacerbation, receiving solumedrol.  As per RN, pt is snacking a lot.  Asymptomatic.  Stat Glucose ordered.  Pt to receive 30 U of lantus tonight.  6 U of humolog ordered.  Observe and reassess prn. Called by RN with a ; Pt admitted for COPD exacerbation, receiving solumedrol.  As per RN, pt is snacking a lot.  Asymptomatic.  Stat Glucose ordered.  Pt to receive 30 U of lantus tonight.  6 U of humolog ordered.  1/2NS @ 75cc/hr until seen by MD.  RN to educate pt on limiting snacks.  Observe and reassess prn.

## 2018-05-04 NOTE — PROGRESS NOTE BEHAVIORAL HEALTH - SUMMARY
Patient a 53 y/o male, unemployed, domiciled at [facility] for 15 months, no prior SA, hx of Schizophrenia, last hospitalized at [facility] in 1998 for 8 days for medical reason and he was lat hospitalized psychiatrically in 1985, denied aggression or violence or any self mutilation habits, medically has DM with HLD was BIB/Group home for Psychiatric Evaluation.  Patient has reportedly been stealing cigarettes and and being intrusive and agitated at home while trying to steal cigarettes..  On interview, patient reports that he has "grubbed cigarettes" because he ran out of cigarrettes does not have enough money to buy more. He denies any other stressors and reports he feels fine and wants to go home.  He was not aggressive, agitated and did not appear to be responding to internal stimuli.  He appeared forthcoming on interview.  He denies depressed mood, psychotic sx's, sancho. He reports chornic passive suicidal ideation for years however denies suicidal plan or intent.  He has been compliant with medications and denies any adverse effects.     Patient was seen in ED 3 days ago by psychiatry at that time Collateral was obtained from Diane Chen NP who knows patient well and does not note patient to be suicidal/homicidal, or violent and has been compliant with medications.     Possible patient's smoking is increasing metabolism of his haldol and leading to more agitation   Patient currently does not appear to  require inpatient psychiatric hospitalization.  Plan at this time is to dc back to group home with follow up with Psychiatric NP.      Consider increasing haldol to 15 mg daily , keep night time dose at 10 mg;  discussed wellbutrin as option for smoking cessation patient declines; also does not want patch or gum Patient a 53 y/o male, unemployed, domiciled at [facility] for 15 months, no prior SA, hx of Schizophrenia, last hospitalized at [facility] in 1998 for 8 days for medical reason and he was lat hospitalized psychiatrically in 1985, denied aggression or violence or any self mutilation habits, medically has DM with HLD was BIB/Group home for Psychiatric Evaluation.  Patient has reportedly been stealing cigarettes and and being intrusive and agitated at home while trying to steal cigarettes..  On interview, patient reports that he has "grubbed cigarettes" because he ran out of cigarrettes does not have enough money to buy more. He denies any other stressors and reports he feels fine and wants to go home.  He was not aggressive, agitated and did not appear to be responding to internal stimuli.  He appeared forthcoming on interview.  He denies depressed mood, psychotic sx's, sancho. He reports chornic passive suicidal ideation for years however denies suicidal plan or intent.  He has been compliant with medications and denies any adverse effects.     Patient was seen in ED 3 days ago by psychiatry at that time Collateral was obtained from Diane Chen NP who knows patient well and does not note patient to be suicidal/homicidal, or violent and has been compliant with medications.     Possible patient's smoking is increasing metabolism of his haldol and leading to more agitation   Patient currently does not appear to  require inpatient psychiatric hospitalization.  Plan at this time is to dc back to group home with follow up with Psychiatric NP.      Continue Haldol 20 mg 2x daily.   discussed wellbutrin as option for smoking cessation patient declines; also does not want patch or gum

## 2018-05-05 ENCOUNTER — TRANSCRIPTION ENCOUNTER (OUTPATIENT)
Age: 53
End: 2018-05-05

## 2018-05-05 VITALS — OXYGEN SATURATION: 97 %

## 2018-05-05 LAB
ANION GAP SERPL CALC-SCNC: 11 MMOL/L — SIGNIFICANT CHANGE UP (ref 5–17)
BUN SERPL-MCNC: 20 MG/DL — SIGNIFICANT CHANGE UP (ref 8–20)
CALCIUM SERPL-MCNC: 9.1 MG/DL — SIGNIFICANT CHANGE UP (ref 8.6–10.2)
CHLORIDE SERPL-SCNC: 98 MMOL/L — SIGNIFICANT CHANGE UP (ref 98–107)
CO2 SERPL-SCNC: 29 MMOL/L — SIGNIFICANT CHANGE UP (ref 22–29)
CREAT SERPL-MCNC: 0.64 MG/DL — SIGNIFICANT CHANGE UP (ref 0.5–1.3)
GLUCOSE BLDC GLUCOMTR-MCNC: 285 MG/DL — HIGH (ref 70–99)
GLUCOSE BLDC GLUCOMTR-MCNC: 368 MG/DL — HIGH (ref 70–99)
GLUCOSE BLDC GLUCOMTR-MCNC: 388 MG/DL — HIGH (ref 70–99)
GLUCOSE SERPL-MCNC: 304 MG/DL — HIGH (ref 70–115)
POTASSIUM SERPL-MCNC: 4.1 MMOL/L — SIGNIFICANT CHANGE UP (ref 3.5–5.3)
POTASSIUM SERPL-SCNC: 4.1 MMOL/L — SIGNIFICANT CHANGE UP (ref 3.5–5.3)
SODIUM SERPL-SCNC: 138 MMOL/L — SIGNIFICANT CHANGE UP (ref 135–145)

## 2018-05-05 PROCEDURE — 93010 ELECTROCARDIOGRAM REPORT: CPT

## 2018-05-05 PROCEDURE — 99239 HOSP IP/OBS DSCHRG MGMT >30: CPT

## 2018-05-05 PROCEDURE — 99231 SBSQ HOSP IP/OBS SF/LOW 25: CPT

## 2018-05-05 RX ORDER — HALOPERIDOL DECANOATE 100 MG/ML
1 INJECTION INTRAMUSCULAR
Qty: 0 | Refills: 0 | DISCHARGE
Start: 2018-05-05

## 2018-05-05 RX ORDER — ASPIRIN/CALCIUM CARB/MAGNESIUM 324 MG
1 TABLET ORAL
Qty: 0 | Refills: 0 | DISCHARGE
Start: 2018-05-05

## 2018-05-05 RX ORDER — QUETIAPINE FUMARATE 200 MG/1
1 TABLET, FILM COATED ORAL
Qty: 0 | Refills: 0 | DISCHARGE
Start: 2018-05-05

## 2018-05-05 RX ORDER — TRAZODONE HCL 50 MG
1 TABLET ORAL
Qty: 0 | Refills: 0 | COMMUNITY

## 2018-05-05 RX ORDER — HALOPERIDOL DECANOATE 100 MG/ML
1 INJECTION INTRAMUSCULAR
Qty: 0 | Refills: 0 | DISCHARGE
Start: 2018-05-05 | End: 2018-05-14

## 2018-05-05 RX ORDER — DOCUSATE SODIUM 100 MG
1 CAPSULE ORAL
Qty: 0 | Refills: 0 | DISCHARGE
Start: 2018-05-05

## 2018-05-05 RX ORDER — INSULIN LISPRO 100/ML
0 VIAL (ML) SUBCUTANEOUS
Qty: 0 | Refills: 0 | COMMUNITY

## 2018-05-05 RX ORDER — HALOPERIDOL DECANOATE 100 MG/ML
15 INJECTION INTRAMUSCULAR DAILY
Qty: 0 | Refills: 0 | Status: DISCONTINUED | OUTPATIENT
Start: 2018-05-05 | End: 2018-05-05

## 2018-05-05 RX ORDER — HALOPERIDOL DECANOATE 100 MG/ML
1 INJECTION INTRAMUSCULAR
Qty: 10 | Refills: 0
Start: 2018-05-05 | End: 2018-05-14

## 2018-05-05 RX ORDER — DOCUSATE SODIUM 100 MG
0 CAPSULE ORAL
Qty: 0 | Refills: 0 | COMMUNITY

## 2018-05-05 RX ORDER — BENZTROPINE MESYLATE 1 MG
0 TABLET ORAL
Qty: 0 | Refills: 0 | COMMUNITY

## 2018-05-05 RX ORDER — INSULIN LISPRO 100/ML
5 VIAL (ML) SUBCUTANEOUS
Qty: 0 | Refills: 0 | COMMUNITY

## 2018-05-05 RX ORDER — HALOPERIDOL DECANOATE 100 MG/ML
10 INJECTION INTRAMUSCULAR AT BEDTIME
Qty: 0 | Refills: 0 | Status: DISCONTINUED | OUTPATIENT
Start: 2018-05-05 | End: 2018-05-05

## 2018-05-05 RX ORDER — INSULIN GLARGINE 100 [IU]/ML
0 INJECTION, SOLUTION SUBCUTANEOUS
Qty: 0 | Refills: 0 | COMMUNITY

## 2018-05-05 RX ORDER — MAGNESIUM OXIDE 400 MG ORAL TABLET 241.3 MG
0 TABLET ORAL
Qty: 0 | Refills: 0 | COMMUNITY

## 2018-05-05 RX ORDER — HALOPERIDOL DECANOATE 100 MG/ML
0 INJECTION INTRAMUSCULAR
Qty: 0 | Refills: 0 | COMMUNITY

## 2018-05-05 RX ORDER — BENZTROPINE MESYLATE 1 MG
1 TABLET ORAL
Qty: 0 | Refills: 0 | DISCHARGE
Start: 2018-05-05

## 2018-05-05 RX ORDER — ZIPRASIDONE HYDROCHLORIDE 20 MG/1
0 CAPSULE ORAL
Qty: 0 | Refills: 0 | COMMUNITY

## 2018-05-05 RX ORDER — INSULIN LISPRO 100/ML
5 VIAL (ML) SUBCUTANEOUS
Qty: 1 | Refills: 0
Start: 2018-05-05

## 2018-05-05 RX ORDER — HALOPERIDOL DECANOATE 100 MG/ML
1 INJECTION INTRAMUSCULAR
Qty: 10 | Refills: 0 | DISCHARGE
Start: 2018-05-05 | End: 2018-05-14

## 2018-05-05 RX ORDER — SERTRALINE 25 MG/1
1 TABLET, FILM COATED ORAL
Qty: 0 | Refills: 0 | COMMUNITY

## 2018-05-05 RX ORDER — ZIPRASIDONE HYDROCHLORIDE 20 MG/1
1 CAPSULE ORAL
Qty: 0 | Refills: 0 | DISCHARGE
Start: 2018-05-05

## 2018-05-05 RX ORDER — INSULIN LISPRO 100/ML
5 VIAL (ML) SUBCUTANEOUS
Qty: 1 | Refills: 0 | OUTPATIENT
Start: 2018-05-05

## 2018-05-05 RX ADMIN — MONTELUKAST 10 MILLIGRAM(S): 4 TABLET, CHEWABLE ORAL at 11:39

## 2018-05-05 RX ADMIN — Medication 6: at 08:28

## 2018-05-05 RX ADMIN — SODIUM CHLORIDE 75 MILLILITER(S): 9 INJECTION, SOLUTION INTRAVENOUS at 05:55

## 2018-05-05 RX ADMIN — Medication 100 MILLIGRAM(S): at 11:39

## 2018-05-05 RX ADMIN — Medication 200 MILLIGRAM(S): at 05:49

## 2018-05-05 RX ADMIN — HALOPERIDOL DECANOATE 20 MILLIGRAM(S): 100 INJECTION INTRAMUSCULAR at 05:49

## 2018-05-05 RX ADMIN — Medication 200 MILLIGRAM(S): at 13:55

## 2018-05-05 RX ADMIN — Medication 10: at 11:54

## 2018-05-05 RX ADMIN — Medication 3 MILLILITER(S): at 08:36

## 2018-05-05 RX ADMIN — Medication 1 PATCH: at 13:18

## 2018-05-05 RX ADMIN — Medication 1 MILLIGRAM(S): at 13:55

## 2018-05-05 RX ADMIN — Medication 2 MILLIGRAM(S): at 05:55

## 2018-05-05 RX ADMIN — Medication 5 UNIT(S): at 08:28

## 2018-05-05 RX ADMIN — INSULIN GLARGINE 30 UNIT(S): 100 INJECTION, SOLUTION SUBCUTANEOUS at 08:27

## 2018-05-05 RX ADMIN — Medication 2 MILLIGRAM(S): at 13:55

## 2018-05-05 RX ADMIN — ZIPRASIDONE HYDROCHLORIDE 20 MILLIGRAM(S): 20 CAPSULE ORAL at 05:49

## 2018-05-05 RX ADMIN — Medication 81 MILLIGRAM(S): at 11:39

## 2018-05-05 RX ADMIN — MAGNESIUM OXIDE 400 MG ORAL TABLET 400 MILLIGRAM(S): 241.3 TABLET ORAL at 11:39

## 2018-05-05 RX ADMIN — HALOPERIDOL DECANOATE 15 MILLIGRAM(S): 100 INJECTION INTRAMUSCULAR at 11:40

## 2018-05-05 RX ADMIN — Medication 5 UNIT(S): at 11:55

## 2018-05-05 RX ADMIN — PANTOPRAZOLE SODIUM 40 MILLIGRAM(S): 20 TABLET, DELAYED RELEASE ORAL at 05:49

## 2018-05-05 RX ADMIN — Medication 40 MILLIGRAM(S): at 05:49

## 2018-05-05 RX ADMIN — ENOXAPARIN SODIUM 40 MILLIGRAM(S): 100 INJECTION SUBCUTANEOUS at 11:54

## 2018-05-05 RX ADMIN — Medication 1 MILLIGRAM(S): at 05:50

## 2018-05-05 NOTE — DISCHARGE NOTE ADULT - PLAN OF CARE
. c/w prednisone taper, nebs, stop smoking   f/u Pulmonary as an outpatient c/w home medication, only change is increase Haldol 15 mg QAM, 10mg QHS   f/u psych uncontrolled, c/w Lantus and start Humalog 5 units premeal PPI c/w home medication counseled to stop smoking

## 2018-05-05 NOTE — DISCHARGE NOTE ADULT - MEDICATION SUMMARY - MEDICATIONS TO CHANGE
I will SWITCH the dose or number of times a day I take the medications listed below when I get home from the hospital:    Haldol 5 mg/mL injectable solution  --  oral transmucosal    HumaLOG 100 units/mL subcutaneous solution  --  subcutaneous

## 2018-05-05 NOTE — DISCHARGE NOTE ADULT - HOSPITAL COURSE
This is 53 y/o male smoker (1 pack per day) with PMHx of HTN, Depression, gastritis, asthma, DM, paranoid schizophrenia, brain tumor (since age 15, same size) presents to the ED via EMS from [place] for evaluation of aggressive behavior.  Also report SOB, admitted for COPD exacerbation. Pt was started on IV solumedrol, nebs, abx, chest xray Emphysematous changes, no acute consolidation.  There are no pleural effusion or pneumothorax. Pt is feeling better, saturating well on room air. Pt was also seen by psychiatry, suggest to increase Haldol QAM, c/w haldol 10 qhs and rest of home medication. Pt has been stable, clear by psychiatry to discharge back to group home.       A/P    1- COPD exacerbation - improved, saturating well room air, ambulating   C/W albuterol, start Prednisone taper   c/w singular  albuterol, theophyline    2- Schizophrenia  Psych consult appreciated   c/w haldol 15QAM, 10mg QHS, cogentin, Seroquel,  Klonopin    3-DM - improving   C/W LANTUS 30 unit bid plus  Humalog 5 unit tid, increase sliding scale to moderate     4- hx of Gastritis  c/w PPI    5- Smoker  nicotine patch and smoking cessation education    ICU Vital Signs Last 24 Hrs  T(C): 36.8 (05 May 2018 05:56), Max: 37 (04 May 2018 15:48)  T(F): 98.2 (05 May 2018 05:56), Max: 98.6 (04 May 2018 15:48)  HR: 60 (05 May 2018 08:37) (49 - 88)  BP: 113/71 (05 May 2018 05:56) (107/66 - 116/70)  RR: 20 (05 May 2018 05:56) (17 - 20)  SpO2: 97% (05 May 2018 11:25) (95% - 97%)    PHYSICAL EXAM:    GENERAL: NAD  CHEST/LUNG: improved air entry, no wheezing   HEART:  s1/s2 audible   ABDOMEN: Soft, Nontender, Nondistended; Bowel sounds present  EXTREMITIES:  no edema     Time spent 40 minutes This is 51 y/o male smoker (1 pack per day) with PMHx of HTN, Depression, gastritis, asthma, DM, paranoid schizophrenia, brain tumor (since age 15, same size) presents to the ED via EMS from [place] for evaluation of aggressive behavior.  Also report SOB, admitted for COPD exacerbation. Pt was started on IV solumedrol, nebs, abx, chest xray Emphysematous changes, no acute consolidation.  There are no pleural effusion or pneumothorax. Pt is feeling better, saturating well on room air. Pt was also seen by psychiatry, suggest to increase Haldol QAM, c/w haldol 10 qhs and rest of home medication. Pt has been stable, clear by psychiatry to discharge back to group home.       A/P    1- COPD exacerbation - improved, saturating well room air, ambulating   C/W albuterol, start Prednisone taper   c/w singular  albuterol, theophyline    2- Schizophrenia  Psych consult appreciated   c/w haldol 15QAM, 10mg QHS, cogentin, Seroquel,  Klonopin    3-DM - improving   C/W LANTUS 30 unit bid plus  Humalog 5 unit tid plus sliding scale    4- hx of Gastritis  c/w PPI    5- Smoker  nicotine patch and smoking cessation education    ICU Vital Signs Last 24 Hrs  T(C): 36.8 (05 May 2018 05:56), Max: 37 (04 May 2018 15:48)  T(F): 98.2 (05 May 2018 05:56), Max: 98.6 (04 May 2018 15:48)  HR: 60 (05 May 2018 08:37) (49 - 88)  BP: 113/71 (05 May 2018 05:56) (107/66 - 116/70)  RR: 20 (05 May 2018 05:56) (17 - 20)  SpO2: 97% (05 May 2018 11:25) (95% - 97%)    PHYSICAL EXAM:    GENERAL: NAD  CHEST/LUNG: improved air entry, no wheezing   HEART:  s1/s2 audible   ABDOMEN: Soft, Nontender, Nondistended; Bowel sounds present  EXTREMITIES:  no edema     Time spent 40 minutes This is 51 y/o male smoker (1 pack per day) with PMHx of HTN, Depression, gastritis, asthma, DM, paranoid schizophrenia, brain tumor (since age 15, same size) presents to the ED via EMS from [place] for evaluation of aggressive behavior.  Also report SOB, admitted for COPD exacerbation. Pt was started on IV solumedrol, nebs, abx, chest xray Emphysematous changes, no acute consolidation.  There are no pleural effusion or pneumothorax. Pt is feeling better, saturating well on room air. Pt was also seen by psychiatry, suggest to increase Haldol QAM, c/w haldol 10 qhs and rest of home medication. Pt has been stable, clear by psychiatry to discharge back to group home.       A/P    1- COPD exacerbation - improved, saturating well room air, ambulating   C/W albuterol, start Prednisone taper   c/w singular  albuterol, theophyline    2- Schizophrenia  Psych consult appreciated   c/w haldol 15QAM, 10mg QHS, cogentin, Seroquel,  Klonopin    3-DM - improving   C/W LANTUS 30 unit bid plus  Humalog 5 unit tid plus sliding scale    4- hx of Gastritis  c/w PPI    5- Smoker  nicotine patch and smoking cessation education    6. Patient was noted to be bradycardiac while sleeping, HR was in 40's, asymptomatic, but this morning, HR was in 60's, EKG first degree with normal QTC, not on BB, he is on multiple psych medication, suggest to f/u periodic  EKG as an outpatient to f/u QTC     ICU Vital Signs Last 24 Hrs  T(C): 36.8 (05 May 2018 05:56), Max: 37 (04 May 2018 15:48)  T(F): 98.2 (05 May 2018 05:56), Max: 98.6 (04 May 2018 15:48)  HR: 60 (05 May 2018 08:37) (49 - 88)  BP: 113/71 (05 May 2018 05:56) (107/66 - 116/70)  RR: 20 (05 May 2018 05:56) (17 - 20)  SpO2: 97% (05 May 2018 11:25) (95% - 97%)    PHYSICAL EXAM:    GENERAL: NAD  CHEST/LUNG: improved air entry, no wheezing   HEART:  s1/s2 audible   ABDOMEN: Soft, Nontender, Nondistended; Bowel sounds present  EXTREMITIES:  no edema     Time spent 40 minutes

## 2018-05-05 NOTE — DISCHARGE NOTE ADULT - PATIENT PORTAL LINK FT
You can access the [facility] Patient Portal, offered by [facility], by registering with the following website: [email or web address]

## 2018-05-05 NOTE — CHART NOTE - NSCHARTNOTEFT_GEN_A_CORE
CC: asymptomatic bradycardia   INTERVAL HPI/OVERNIGHT EVENTS: PA called by nurse to evaluate Pt with bradycardia. As per EKG monitor tech, put was NSR at HR in the 60s all throughout the night until at 0600 when his heart rate decreased to 40s. Pt states he was awake and denies chest pain or discomfort, shortness of breath, dizziness, lightheadedness or palpitations.      REVIEW OF SYSTEMS:  CONSTITUTIONAL: No fever, weight loss, or fatigue  EYES: No eye pain, visual disturbances, or discharge  ENT:  No difficulty hearing, tinnitus, vertigo; No sinus or throat pain  NECK: No pain or stiffness  RESPIRATORY: No cough, wheezing, chills or hemoptysis; No shortness of breath  CARDIOVASCULAR: See HPI  GASTROINTESTINAL: No abdominal or epigastric pain. No nausea, vomiting, or hematemesis; No diarrhea or constipation. No melena or hematochezia.  GENITOURINARY: No dysuria, frequency, hematuria, or incontinence  NEUROLOGICAL: No headaches, memory loss, loss of strength, numbness, or tremors  SKIN: No itching, burning, rashes, or lesions   MUSCULOSKELETAL: No joint pain or swelling; No muscle, back, or extremity pain    Allergies    No Known Allergies    Intolerances      HEALTH ISSUES - PROBLEM Dx:  PAST MEDICAL & SURGICAL HISTORY:  Depression  GI bleed  Schizophrenia  Gastritis  Bronchitis  Hypertension  DM (diabetes mellitus)  No significant past surgical history      VITAL SIGNS:  T(C): 36.8 (05-05-18 @ 04:55), Max: 37 (05-04-18 @ 15:48)  HR: 59 (05-05-18 @ 04:55) (59 - 92)  BP: 107/66 (05-05-18 @ 04:55) (107/66 - 116/70)  RR: 17 (05-05-18 @ 04:55) (17 - 20)  SpO2: 97% (05-05-18 @ 04:55) (95% - 98%)  Wt(kg): --Vital Signs Last 24 Hrs  T(C): 36.8 (05 May 2018 04:55), Max: 37 (04 May 2018 15:48)  T(F): 98.3 (05 May 2018 04:55), Max: 98.6 (04 May 2018 15:48)  HR: 59 (05 May 2018 04:55) (59 - 92)  BP: 107/66 (05 May 2018 04:55) (107/66 - 116/70)  BP(mean): --  RR: 17 (05 May 2018 04:55) (17 - 20)  SpO2: 97% (05 May 2018 04:55) (95% - 98%)    PHYSICAL EXAM:  GENERAL: NAD, lying comfortably in bed, non diaphoretic  HEAD:  Atraumatic, Normocephalic  EYES: EOMI, PERRLA, conjunctiva and sclera clear  ENT: Moist mucous membranes  NECK: Supple, No JVD  CHEST/LUNG: Clear to auscultation bilaterally; No rales, rhonchi, wheezing, or rubs  HEART: HR 54 to palpation, Regular rhythm; No murmurs, rubs, or gallops  ABDOMEN: Bowel sounds present; Soft, Nontender, Nondistended  EXTREMITIES:  2+ Peripheral Pulses, No clubbing, cyanosis, or edema  NERVOUS SYSTEM:  Alert & Oriented X3, no focal deficits   LYMPH: No lymphadenopathy noted  SKIN: No rashes or lesions    LABS:                        15.2   8.7   )-----------( 165      ( 04 May 2018 06:07 )             46.0     05-04    135  |  94<L>  |  23.0<H>  ----------------------------<  374<H>  5.0   |  28.0  |  0.63    Ca    9.2      04 May 2018 06:07    TPro  6.5<L>  /  Alb  4.1  /  TBili  0.5  /  DBili  x   /  AST  14  /  ALT  11  /  AlkPhos  78  05-04    CAPILLARY BLOOD GLUCOSE  POCT Blood Glucose.: 368 mg/dL (05 May 2018 01:34)  POCT Blood Glucose.: 396 mg/dL (04 May 2018 23:15)  POCT Blood Glucose.: >530 mg/dL (04 May 2018 23:08)  POCT Blood Glucose.: 378 mg/dL (04 May 2018 17:35)  POCT Blood Glucose.: 416 mg/dL (04 May 2018 12:13)  POCT Blood Glucose.: 349 mg/dL (04 May 2018 08:16)    ASSESSMENT/ PLAN: Pt is a 51 y/o male smoker (1 pack per day) with PMHx of HTN, Depression, gastritis, asthma, DM, paranoid schizophrenia, brain tumor (since age 15, same size) presents to the ED via EMS from [place] for evaluation of aggressive behavior and admitted for SOB and for COPD exacerbation, stable    1. Asymptomatic bradycardia:  -STAT EKG shows HR 60, sinus rhythm 1st degree AV block. Incomplete RBBB same as previous EKG  -continue to monitor, reassess prn CC: asymptomatic bradycardia   INTERVAL HPI/OVERNIGHT EVENTS: PA called by nurse to evaluate Pt with bradycardia. As per EKG monitor tech, put was NSR at HR in the 60s all throughout the night until at 0600 when his heart rate decreased to 40s. Pt states he was awake and denies chest pain or discomfort, shortness of breath, dizziness, lightheadedness or palpitations. Pt received Haldol, Klonopin and cogentin at 0500.    REVIEW OF SYSTEMS:  CONSTITUTIONAL: No fever, weight loss, or fatigue  EYES: No eye pain, visual disturbances, or discharge  ENT:  No difficulty hearing, tinnitus, vertigo; No sinus or throat pain  NECK: No pain or stiffness  RESPIRATORY: No cough, wheezing, chills or hemoptysis; No shortness of breath  CARDIOVASCULAR: See HPI  GASTROINTESTINAL: No abdominal or epigastric pain. No nausea, vomiting, or hematemesis; No diarrhea or constipation. No melena or hematochezia.  GENITOURINARY: No dysuria, frequency, hematuria, or incontinence  NEUROLOGICAL: No headaches, memory loss, loss of strength, numbness, or tremors  SKIN: No itching, burning, rashes, or lesions   MUSCULOSKELETAL: No joint pain or swelling; No muscle, back, or extremity pain    Allergies    No Known Allergies    Intolerances      HEALTH ISSUES - PROBLEM Dx:  PAST MEDICAL & SURGICAL HISTORY:  Depression  GI bleed  Schizophrenia  Gastritis  Bronchitis  Hypertension  DM (diabetes mellitus)  No significant past surgical history      VITAL SIGNS:  T(C): 36.8 (05-05-18 @ 04:55), Max: 37 (05-04-18 @ 15:48)  HR: 59 (05-05-18 @ 04:55) (59 - 92)  BP: 107/66 (05-05-18 @ 04:55) (107/66 - 116/70)  RR: 17 (05-05-18 @ 04:55) (17 - 20)  SpO2: 97% (05-05-18 @ 04:55) (95% - 98%)  Wt(kg): --Vital Signs Last 24 Hrs  T(C): 36.8 (05 May 2018 04:55), Max: 37 (04 May 2018 15:48)  T(F): 98.3 (05 May 2018 04:55), Max: 98.6 (04 May 2018 15:48)  HR: 59 (05 May 2018 04:55) (59 - 92)  BP: 107/66 (05 May 2018 04:55) (107/66 - 116/70)  BP(mean): --  RR: 17 (05 May 2018 04:55) (17 - 20)  SpO2: 97% (05 May 2018 04:55) (95% - 98%)    PHYSICAL EXAM:  GENERAL: NAD, lying comfortably in bed, non diaphoretic  HEAD:  Atraumatic, Normocephalic  EYES: EOMI, PERRLA, conjunctiva and sclera clear  ENT: Moist mucous membranes  NECK: Supple, No JVD  CHEST/LUNG: Clear to auscultation bilaterally; No rales, rhonchi, wheezing, or rubs  HEART: HR 54 to palpation, Regular rhythm; No murmurs, rubs, or gallops  ABDOMEN: Bowel sounds present; Soft, Nontender, Nondistended  EXTREMITIES:  2+ Peripheral Pulses, No clubbing, cyanosis, or edema  NERVOUS SYSTEM:  Alert & Oriented X3, no focal deficits   LYMPH: No lymphadenopathy noted  SKIN: No rashes or lesions    LABS:                        15.2   8.7   )-----------( 165      ( 04 May 2018 06:07 )             46.0     05-04    135  |  94<L>  |  23.0<H>  ----------------------------<  374<H>  5.0   |  28.0  |  0.63    Ca    9.2      04 May 2018 06:07    TPro  6.5<L>  /  Alb  4.1  /  TBili  0.5  /  DBili  x   /  AST  14  /  ALT  11  /  AlkPhos  78  05-04    CAPILLARY BLOOD GLUCOSE  POCT Blood Glucose.: 368 mg/dL (05 May 2018 01:34)  POCT Blood Glucose.: 396 mg/dL (04 May 2018 23:15)  POCT Blood Glucose.: >530 mg/dL (04 May 2018 23:08)  POCT Blood Glucose.: 378 mg/dL (04 May 2018 17:35)  POCT Blood Glucose.: 416 mg/dL (04 May 2018 12:13)  POCT Blood Glucose.: 349 mg/dL (04 May 2018 08:16)    ASSESSMENT/ PLAN: Pt is a 53 y/o male smoker (1 pack per day) with PMHx of HTN, Depression, gastritis, asthma, DM, paranoid schizophrenia, brain tumor (since age 15, same size) presents to the ED via EMS from [place] for evaluation of aggressive behavior and admitted for SOB and for COPD exacerbation, stable    1. Asymptomatic bradycardia:  -STAT EKG shows HR 60, sinus rhythm 1st degree AV block. Incomplete RBBB same as previous EKG. QT interval within normal range, 452ms. No acute changes seen compared to last EKG.   -Pt on cardiac monitor. Vitals are stable, Pt is asymptomatic and stable. Continue to observe, reassess prn.   -case d/w Dr Keys, in agreement with plan

## 2018-05-05 NOTE — DISCHARGE NOTE ADULT - MEDICATION SUMMARY - MEDICATIONS TO TAKE
I will START or STAY ON the medications listed below when I get home from the hospital:    predniSONE 10 mg oral tablet  -- taper as follows;10mgx 2 days, 30mg x 2 days, 20mg x 2 days, 10mg x 2 days.  -- It is very important that you take or use this exactly as directed.  Do not skip doses or discontinue unless directed by your doctor.  Obtain medical advice before taking any non-prescription drugs as some may affect the action of this medication.  Take with food or milk.    -- Indication: For COPD (chronic obstructive pulmonary disease)    aspirin 81 mg oral delayed release tablet  -- 1 tab(s) by mouth once a day  -- Indication: For Home medication    KlonoPIN 2 mg oral tablet  -- 1 tab(s) by mouth 3 times a day  -- Indication: For Schizophrenia    Lantus 100 units/mL subcutaneous solution  -- 30 unit(s) subcutaneous 2 times a day  -- Indication: For dm    HumaLOG 100 units/mL injectable solution  -- 5 unit(s) injectable 3 times a day (before meals)  HOLD if FS is less than 120 or NPO   -- Indication: For dm    Zocor 20 mg oral tablet  -- 1 tab(s) by mouth once a day (at bedtime)  -- Indication: For Home medication    benztropine 1 mg oral tablet  -- 1 tab(s) by mouth 3 times a day  -- Indication: For Home medication    SEROquel 300 mg oral tablet  -- 1 tab(s) by mouth once a day (at bedtime)  -- Indication: For Home medication    ziprasidone 20 mg oral capsule  -- 1 cap(s) by mouth 2 times a day  -- Indication: For Home medication    haloperidol 10 mg oral tablet  -- 1 tab(s) by mouth 2 times a day  -- Indication: For Schizophrenia    haloperidol 5 mg oral tablet  -- 1 tab(s) by mouth once a day - 15mg qam, 10mg qhs   -- It is very important that you take or use this exactly as directed.  Do not skip doses or discontinue unless directed by your doctor.  May cause drowsiness.  Alcohol may intensify this effect.  Use care when operating dangerous machinery.  Obtain medical advice before taking any non-prescription drugs as some may affect the action of this medication.    -- Indication: For Schizophrenia    DuoNeb 0.5 mg-2.5 mg/3 mL inhalation solution  -- 3 milliliter(s) inhaled 4 times a day  -- Indication: For Chronic obstructive pulmonary disease    theophylline 600 mg/24 hours oral tablet, extended release  -- 1 tab(s) by mouth once a day  -- Indication: For COPD (chronic obstructive pulmonary disease)    senna 8.6 mg oral tablet  -- 1 tab(s) by mouth once a day (at bedtime)  -- Indication: For COnstipation    Colace 100 mg oral capsule  -- 1 cap(s) by mouth once a day  -- Indication: For COnstipation    Singulair 10 mg oral tablet  -- 1 tab(s) by mouth once a day  -- Indication: For COnstipation    Mag-Ox 400  -- 1 tab(s) by mouth once a day  -- Indication: For Home medication    Travatan 0.004% ophthalmic solution  -- 1 drop(s) to each affected eye once a day (in the evening)  -- Indication: For Home medication    PriLOSEC 20 mg oral delayed release capsule  -- 1 cap(s) by mouth once a day  -- Indication: For Home medication    Daliresp 500 mcg oral tablet  -- 1 tab(s) by mouth once a day  -- Indication: For Home medication    thiamine 100 mg oral tablet  -- 1 tab(s) by mouth once a day  -- Indication: For Supplement

## 2018-05-05 NOTE — DISCHARGE NOTE ADULT - ADDITIONAL INSTRUCTIONS
f/u primary care, Pulmonary and psychiatry   f/u EKG to f/u qtc as he is on multiple psych medication  c/w Lantus and start Humalog 5 units AC - hold if FS is less than 120 or NPO

## 2018-05-05 NOTE — DISCHARGE NOTE ADULT - CARE PLAN
Principal Discharge DX:	COPD (chronic obstructive pulmonary disease)  Goal:	.  Assessment and plan of treatment:	c/w prednisone taper, nebs, stop smoking   f/u Pulmonary as an outpatient  Secondary Diagnosis:	Schizophrenia  Assessment and plan of treatment:	c/w home medication, only change is increase Haldol 15 mg QAM, 10mg QHS   f/u psych  Secondary Diagnosis:	DM (diabetes mellitus)  Assessment and plan of treatment:	uncontrolled, c/w Lantus and start Humalog 5 units premeal  Secondary Diagnosis:	Gastritis  Assessment and plan of treatment:	PPI  Secondary Diagnosis:	Depression  Assessment and plan of treatment:	c/w home medication  Secondary Diagnosis:	Nicotine abuse  Assessment and plan of treatment:	counseled to stop smoking

## 2018-05-23 PROCEDURE — 96374 THER/PROPH/DIAG INJ IV PUSH: CPT

## 2018-05-23 PROCEDURE — 71045 X-RAY EXAM CHEST 1 VIEW: CPT

## 2018-05-23 PROCEDURE — 80048 BASIC METABOLIC PNL TOTAL CA: CPT

## 2018-05-23 PROCEDURE — 94760 N-INVAS EAR/PLS OXIMETRY 1: CPT

## 2018-05-23 PROCEDURE — 84443 ASSAY THYROID STIM HORMONE: CPT

## 2018-05-23 PROCEDURE — 80307 DRUG TEST PRSMV CHEM ANLYZR: CPT

## 2018-05-23 PROCEDURE — 99284 EMERGENCY DEPT VISIT MOD MDM: CPT

## 2018-05-23 PROCEDURE — 82962 GLUCOSE BLOOD TEST: CPT

## 2018-05-23 PROCEDURE — 36415 COLL VENOUS BLD VENIPUNCTURE: CPT

## 2018-05-23 PROCEDURE — 80053 COMPREHEN METABOLIC PANEL: CPT

## 2018-05-23 PROCEDURE — 99285 EMERGENCY DEPT VISIT HI MDM: CPT | Mod: 25

## 2018-05-23 PROCEDURE — 82947 ASSAY GLUCOSE BLOOD QUANT: CPT

## 2018-05-23 PROCEDURE — 94640 AIRWAY INHALATION TREATMENT: CPT

## 2018-05-23 PROCEDURE — 85027 COMPLETE CBC AUTOMATED: CPT

## 2018-05-23 PROCEDURE — 93005 ELECTROCARDIOGRAM TRACING: CPT

## 2018-05-23 PROCEDURE — 83036 HEMOGLOBIN GLYCOSYLATED A1C: CPT

## 2018-07-01 ENCOUNTER — OUTPATIENT (OUTPATIENT)
Dept: OUTPATIENT SERVICES | Facility: HOSPITAL | Age: 53
LOS: 1 days | End: 2018-07-01
Payer: MEDICARE

## 2018-07-08 DIAGNOSIS — Z71.89 OTHER SPECIFIED COUNSELING: ICD-10-CM

## 2018-07-13 NOTE — PROGRESS NOTE BEHAVIORAL HEALTH - NSBHFUPINTERVALHXFT_PSY_A_CORE
Breath sounds clear and equal bilaterally. Summary: Patient a 51 y/o male, unemployed, domiciled at [facility] for 15 months, no prior SA, hx of Schizophrenia, last hospitalized at [facility] in 1998 for 8 days for medical reason and he was lat hospitalized psychiatrically in 1985, denied aggression or violence or any self mutilation habits, medically has DM with HLD was BIB/Group home for Psychiatric Evaluation.  Patient has reportedly been stealing cigarettes and and being intrusive and agitated at home while trying to steal cigarettes..  On interview, patient reports that he has "grubbed cigarettes" because he ran out of [employer] does not have enough money to buy more. He denies any other stressors and reports he feels fine and wants to go home.  He was not aggressive, agitated and did not appear to be responding to internal stimuli.  He appeared forthcoming on interview.  He denies depressed mood, psychotic sx's, sancho.  He has been compliant with medications and denies any adverse effects. RN reports patient has been in good behavioral control while on unit.

## 2018-08-07 ENCOUNTER — APPOINTMENT (OUTPATIENT)
Dept: NEUROLOGY | Facility: CLINIC | Age: 53
End: 2018-08-07

## 2019-01-06 ENCOUNTER — EMERGENCY (EMERGENCY)
Facility: HOSPITAL | Age: 54
LOS: 1 days | Discharge: HOME | End: 2019-01-06
Attending: STUDENT IN AN ORGANIZED HEALTH CARE EDUCATION/TRAINING PROGRAM
Payer: MEDICARE

## 2019-01-06 VITALS
HEIGHT: 73 IN | OXYGEN SATURATION: 92 % | HEART RATE: 107 BPM | SYSTOLIC BLOOD PRESSURE: 114 MMHG | TEMPERATURE: 99 F | DIASTOLIC BLOOD PRESSURE: 75 MMHG | WEIGHT: 179.9 LBS | RESPIRATION RATE: 20 BRPM

## 2019-01-06 PROCEDURE — 99284 EMERGENCY DEPT VISIT MOD MDM: CPT

## 2019-01-06 PROCEDURE — 93010 ELECTROCARDIOGRAM REPORT: CPT

## 2019-01-06 NOTE — ED PROVIDER NOTE - OBJECTIVE STATEMENT
53 y.o M with PMHx schizophrenia, glaucoma presents to ED BIBA from group home for psych evaluation because he was "breaking rules" over the last several days. Pt was smoking indoors, reports feeling restless. States he is compliant with medications. Denies abdominal pain, n/v, headache, confusion, AH/VH, SI/HI or additional physical complaints. 53 y.o M with PMHx schizophrenia, glaucoma presents to ED BIBA from group [place] for psych evaluation because he was "breaking rules" over the last several days. Pt was smoking indoors, reports feeling restless recently. States he is compliant with medications. Denies abdominal pain, n/v, headache, confusion, AH/VH, SI/HI or additional physical complaints.

## 2019-01-06 NOTE — ED ADULT NURSE NOTE - NSIMPLEMENTINTERV_GEN_ALL_ED
Implemented All Universal Safety Interventions:  [place] to call system. Call bell, personal items and telephone within reach. Instruct patient to call for assistance. Room bathroom lighting operational. Non-slip footwear when patient is off stretcher. Physically safe environment: no spills, clutter or unnecessary equipment. Stretcher in lowest position, wheels locked, appropriate side rails in place.

## 2019-01-06 NOTE — ED PROVIDER NOTE - PROGRESS NOTE DETAILS
Pt medically cleared, will discuss with psych for evaluation. Pt from [facility].

## 2019-01-06 NOTE — ED PROVIDER NOTE - MEDICAL DECISION MAKING DETAILS
53 y.o M 53 y.o M sent from group [place] for psych evaluation. Will consult  to evaluate the patient.

## 2019-01-06 NOTE — ED ADULT TRIAGE NOTE - CHIEF COMPLAINT QUOTE
pt arrive by ambulance from adult home, states "people say i've been acting up", as per EMS pt was trying to smoke inside and took someones cake and ran off with it. pt denies SI, denies HI. denies drugs and alcohol.

## 2019-01-06 NOTE — ED ADULT NURSE NOTE - OBJECTIVE STATEMENT
Patient presents today because the people in his "rest home" states that he was "acting up".  Pt has a flat affect, denies SI/HI.  A&ox3, denies drinking/drug use. admits to tobacco use.  When asked multiple times pt still does not elaborate on what he means by "acting up".

## 2019-01-07 VITALS
HEART RATE: 75 BPM | OXYGEN SATURATION: 96 % | TEMPERATURE: 98 F | DIASTOLIC BLOOD PRESSURE: 68 MMHG | SYSTOLIC BLOOD PRESSURE: 108 MMHG | RESPIRATION RATE: 20 BRPM

## 2019-01-07 PROBLEM — F32.9 MAJOR DEPRESSIVE DISORDER, SINGLE EPISODE, UNSPECIFIED: Chronic | Status: ACTIVE | Noted: 2017-04-17

## 2019-01-07 PROBLEM — F20.9 SCHIZOPHRENIA, UNSPECIFIED: Chronic | Status: ACTIVE | Noted: 2017-04-17

## 2019-01-07 PROBLEM — K29.70 GASTRITIS, UNSPECIFIED, WITHOUT BLEEDING: Chronic | Status: ACTIVE | Noted: 2017-04-17

## 2019-01-07 PROBLEM — E11.9 TYPE 2 DIABETES MELLITUS WITHOUT COMPLICATIONS: Chronic | Status: ACTIVE | Noted: 2017-02-16

## 2019-01-07 PROBLEM — I10 ESSENTIAL (PRIMARY) HYPERTENSION: Chronic | Status: ACTIVE | Noted: 2017-04-17

## 2019-01-07 PROBLEM — K92.2 GASTROINTESTINAL HEMORRHAGE, UNSPECIFIED: Chronic | Status: ACTIVE | Noted: 2017-04-17

## 2019-01-07 LAB
ALBUMIN SERPL ELPH-MCNC: 3.8 G/DL — SIGNIFICANT CHANGE UP (ref 3.3–5.2)
ALP SERPL-CCNC: 80 U/L — SIGNIFICANT CHANGE UP (ref 40–120)
ALT FLD-CCNC: 11 U/L — SIGNIFICANT CHANGE UP
AMPHET UR-MCNC: NEGATIVE — SIGNIFICANT CHANGE UP
ANION GAP SERPL CALC-SCNC: 11 MMOL/L — SIGNIFICANT CHANGE UP (ref 5–17)
APAP SERPL-MCNC: <7.5 UG/ML — LOW (ref 10–26)
APPEARANCE UR: CLEAR — SIGNIFICANT CHANGE UP
AST SERPL-CCNC: 17 U/L — SIGNIFICANT CHANGE UP
BACTERIA # UR AUTO: ABNORMAL
BARBITURATES UR SCN-MCNC: NEGATIVE — SIGNIFICANT CHANGE UP
BASOPHILS # BLD AUTO: 0 K/UL — SIGNIFICANT CHANGE UP (ref 0–0.2)
BASOPHILS NFR BLD AUTO: 0.6 % — SIGNIFICANT CHANGE UP (ref 0–2)
BENZODIAZ UR-MCNC: NEGATIVE — SIGNIFICANT CHANGE UP
BILIRUB SERPL-MCNC: <0.2 MG/DL — LOW (ref 0.4–2)
BILIRUB UR-MCNC: NEGATIVE — SIGNIFICANT CHANGE UP
BUN SERPL-MCNC: 18 MG/DL — SIGNIFICANT CHANGE UP (ref 8–20)
CALCIUM SERPL-MCNC: 9 MG/DL — SIGNIFICANT CHANGE UP (ref 8.6–10.2)
CHLORIDE SERPL-SCNC: 102 MMOL/L — SIGNIFICANT CHANGE UP (ref 98–107)
CO2 SERPL-SCNC: 27 MMOL/L — SIGNIFICANT CHANGE UP (ref 22–29)
COCAINE METAB.OTHER UR-MCNC: NEGATIVE — SIGNIFICANT CHANGE UP
COD CRY URNS QL: ABNORMAL
COLOR SPEC: YELLOW — SIGNIFICANT CHANGE UP
COMMENT - URINE: SIGNIFICANT CHANGE UP
CREAT SERPL-MCNC: 0.63 MG/DL — SIGNIFICANT CHANGE UP (ref 0.5–1.3)
DIFF PNL FLD: NEGATIVE — SIGNIFICANT CHANGE UP
EOSINOPHIL # BLD AUTO: 0.1 K/UL — SIGNIFICANT CHANGE UP (ref 0–0.5)
EOSINOPHIL NFR BLD AUTO: 3.4 % — SIGNIFICANT CHANGE UP (ref 0–6)
EPI CELLS # UR: NEGATIVE — SIGNIFICANT CHANGE UP
ETHANOL SERPL-MCNC: <10 MG/DL — SIGNIFICANT CHANGE UP
GLUCOSE SERPL-MCNC: 170 MG/DL — HIGH (ref 70–115)
GLUCOSE UR QL: 1000 MG/DL
HCT VFR BLD CALC: 41.9 % — LOW (ref 42–52)
HGB BLD-MCNC: 13.6 G/DL — LOW (ref 14–18)
KETONES UR-MCNC: ABNORMAL
LEUKOCYTE ESTERASE UR-ACNC: ABNORMAL
LYMPHOCYTES # BLD AUTO: 1.2 K/UL — SIGNIFICANT CHANGE UP (ref 1–4.8)
LYMPHOCYTES # BLD AUTO: 37.3 % — SIGNIFICANT CHANGE UP (ref 20–55)
MCHC RBC-ENTMCNC: 29.2 PG — SIGNIFICANT CHANGE UP (ref 27–31)
MCHC RBC-ENTMCNC: 32.5 G/DL — SIGNIFICANT CHANGE UP (ref 32–36)
MCV RBC AUTO: 90.1 FL — SIGNIFICANT CHANGE UP (ref 80–94)
METHADONE UR-MCNC: NEGATIVE — SIGNIFICANT CHANGE UP
MONOCYTES # BLD AUTO: 0.6 K/UL — SIGNIFICANT CHANGE UP (ref 0–0.8)
MONOCYTES NFR BLD AUTO: 17.4 % — HIGH (ref 3–10)
NEUTROPHILS # BLD AUTO: 1.3 K/UL — LOW (ref 1.8–8)
NEUTROPHILS NFR BLD AUTO: 41.3 % — SIGNIFICANT CHANGE UP (ref 37–73)
NITRITE UR-MCNC: NEGATIVE — SIGNIFICANT CHANGE UP
OPIATES UR-MCNC: NEGATIVE — SIGNIFICANT CHANGE UP
PCP SPEC-MCNC: SIGNIFICANT CHANGE UP
PCP UR-MCNC: NEGATIVE — SIGNIFICANT CHANGE UP
PH UR: 5 — SIGNIFICANT CHANGE UP (ref 5–8)
PLAT MORPH BLD: NORMAL — SIGNIFICANT CHANGE UP
PLATELET # BLD AUTO: 158 K/UL — SIGNIFICANT CHANGE UP (ref 150–400)
POTASSIUM SERPL-MCNC: 4.1 MMOL/L — SIGNIFICANT CHANGE UP (ref 3.5–5.3)
POTASSIUM SERPL-SCNC: 4.1 MMOL/L — SIGNIFICANT CHANGE UP (ref 3.5–5.3)
PROT SERPL-MCNC: 6.1 G/DL — LOW (ref 6.6–8.7)
PROT UR-MCNC: 30 MG/DL
RBC # BLD: 4.65 M/UL — SIGNIFICANT CHANGE UP (ref 4.6–6.2)
RBC # FLD: 14 % — SIGNIFICANT CHANGE UP (ref 11–15.6)
RBC BLD AUTO: NORMAL — SIGNIFICANT CHANGE UP
RBC CASTS # UR COMP ASSIST: NEGATIVE /HPF — SIGNIFICANT CHANGE UP (ref 0–4)
SALICYLATES SERPL-MCNC: <0.6 MG/DL — LOW (ref 10–20)
SODIUM SERPL-SCNC: 140 MMOL/L — SIGNIFICANT CHANGE UP (ref 135–145)
SP GR SPEC: 1.02 — SIGNIFICANT CHANGE UP (ref 1.01–1.02)
THC UR QL: NEGATIVE — SIGNIFICANT CHANGE UP
TSH SERPL-MCNC: 0.5 UIU/ML — SIGNIFICANT CHANGE UP (ref 0.27–4.2)
UROBILINOGEN FLD QL: NEGATIVE MG/DL — SIGNIFICANT CHANGE UP
WBC # BLD: 3.2 K/UL — LOW (ref 4.8–10.8)
WBC # FLD AUTO: 3.2 K/UL — LOW (ref 4.8–10.8)
WBC UR QL: SIGNIFICANT CHANGE UP

## 2019-01-07 PROCEDURE — 99285 EMERGENCY DEPT VISIT HI MDM: CPT | Mod: 25

## 2019-01-07 PROCEDURE — 93005 ELECTROCARDIOGRAM TRACING: CPT

## 2019-01-07 PROCEDURE — 85027 COMPLETE CBC AUTOMATED: CPT

## 2019-01-07 PROCEDURE — 90792 PSYCH DIAG EVAL W/MED SRVCS: CPT | Mod: GT

## 2019-01-07 PROCEDURE — 81001 URINALYSIS AUTO W/SCOPE: CPT

## 2019-01-07 PROCEDURE — 80053 COMPREHEN METABOLIC PANEL: CPT

## 2019-01-07 PROCEDURE — 96372 THER/PROPH/DIAG INJ SC/IM: CPT

## 2019-01-07 PROCEDURE — 36415 COLL VENOUS BLD VENIPUNCTURE: CPT

## 2019-01-07 PROCEDURE — 82962 GLUCOSE BLOOD TEST: CPT

## 2019-01-07 PROCEDURE — 84443 ASSAY THYROID STIM HORMONE: CPT

## 2019-01-07 PROCEDURE — 80307 DRUG TEST PRSMV CHEM ANLYZR: CPT

## 2019-01-07 RX ORDER — INSULIN HUMAN 100 [IU]/ML
2 INJECTION, SOLUTION SUBCUTANEOUS ONCE
Qty: 0 | Refills: 0 | Status: COMPLETED | OUTPATIENT
Start: 2019-01-07 | End: 2019-01-07

## 2019-01-07 RX ADMIN — INSULIN HUMAN 2 UNIT(S): 100 INJECTION, SOLUTION SUBCUTANEOUS at 06:52

## 2019-01-07 NOTE — ED BEHAVIORAL HEALTH ASSESSMENT NOTE - OTHER PAST PSYCHIATRIC HISTORY (INCLUDE DETAILS REGARDING ONSET, COURSE OF ILLNESS, INPATIENT/OUTPATIENT TREATMENT)
Patient reports three prior psychiatric hospitalizations, last in 1985 (two times in [place], first time in [place] at age 19). Reports prior h/o paranoia, Denies any h/o suicide attempts. Long h/o outpatient treatment

## 2019-01-07 NOTE — ED ADULT NURSE REASSESSMENT NOTE - NS ED NURSE REASSESS COMMENT FT1
pt awakened to interview with tele-psych, pt is diaphoretic a&o x3 denies chills, chest pain, or sob, offers no complaints, vs wnl fs [id number], Dr. Flores made aware of pt's assessment, awaiting orders.
pt sleeping exhibiting no acute distress, awaiting psych consult, pt offers no complaints at this time.
iv removed, sent to  in wheelchair.
Assumed care of patient at 02:50. Patient presents with a flat affect. Patient endorses that "I was off center today". Patient denies any psychotic symptoms. Patient denies suicidal/homicidal ideations. Patient says "I cannot remember all of my medications, but there a lot of them". Patient oriented to the unit. Patient educated to plan of care. Will continue to monitor patient. Safety maintained.

## 2019-01-07 NOTE — ED BEHAVIORAL HEALTH ASSESSMENT NOTE - DESCRIPTION
Patient with flat affect. He was calm, cooperative.  He appeared forthcoming. He was not violent or aggressive DM; HLD Single male lives in a Psychiatric residence, never  with no children. Reports he has learning disability. He did graduate from

## 2019-01-07 NOTE — ED BEHAVIORAL HEALTH ASSESSMENT NOTE - SUMMARY
Patient a 52y/o male, unemployed, domiciled at [facility] for 15 months, no prior SA, hx of Schizophrenia, last hospitalized at [facility] in 1998 for 8 days for medical reason and he was lat hospitalized psychiatrically in 1985, denied aggression or violence or any self mutilation habits, medically has DM with HLD was BIBA from [facility] for psychiatric evaluation after smoking inside and stealing soda.   He denies any other stressors and reports he feels fine and wants to go home.  He was not aggressive, agitated and did not appear to be responding to internal stimuli.  He appeared forthcoming on interview.  He denies depressed mood, psychotic sx's, sancho or any S/H I/I/P. He has been compliant with medications and denies any adverse effects. Patient currently does not require inpatient psychiatric hospitalization. Will dc back to group [place] with follow up with Psychiatric NP. Patient a 52y/o male, unemployed, domiciled at [facility] for 15 months, no prior SA, hx of Schizophrenia, last hospitalized at [facility] in 1998 for 8 days for medical reason and he was lat hospitalized psychiatrically in 1985, denied aggression or violence or any self mutilation habits, medically has DM with HLD was BIBA from [facility] for psychiatric evaluation after smoking inside and stealing soda.   While pt is calm and denies symptoms including si/hi, there is no source of collateral information available at this time from the group home.  Patient will be held pending corroborating information being made available from [facility] to fully determine reason for ER presentation, recent functioning, and to complete risk assessment.

## 2019-01-07 NOTE — ED BEHAVIORAL HEALTH ASSESSMENT NOTE - HPI (INCLUDE ILLNESS QUALITY, SEVERITY, DURATION, TIMING, CONTEXT, MODIFYING FACTORS, ASSOCIATED SIGNS AND SYMPTOMS)
Patient a 54 y/o male, unemployed, domiciled at [facility] for 15 months, no prior SA, hx of Schizophrenia, last hospitalized at [facility] in 1998 for 8 days for medical reason and he was last hospitalized psychiatrically in 1985, denied aggression or violence or any self mutilation habits, medically has DM with HLD was BIBA from [facility] for psychiatric evaluation after smoking inside the home and stealing someone's soda.   Patient has a blunted affect, is poorly related,  but is cooperative with interview. States he smoked inside and stole someone's soda and thus ems was called. he denies any altercation, aggression, self harm or violence.  He denies any violence or aggression towards others. his only complaint is that at his home, "the food sucks" and "they don't care."  He reports he has been taking his medications; he can name geodon and cogentin but no others off the top his head.  He denies any depressive sx's.  He denies any S/H I/I/P.    Concerning other psychiatric symptoms, pt denies violent behavior towards others. Pt denies any episodes of bizarre happiness, unusual energy, unusual nightime excitation or other common symptoms of sancho. Pt denies hearing voices or seeing things.  No delusions were elicited.  Patient denies pervasive anxiety,  panic attacks, obsessions or compulsions.        Spoke with home administrator, Stephanie Patient a 52 y/o male, unemployed, domiciled at [facility] for 15 months, no prior SA, hx of Schizophrenia, last hospitalized at [facility] in 1998 for 8 days for medical reason and he was last hospitalized psychiatrically in 1985, denied aggression or violence or any self mutilation habits, medically has DM with HLD was BIBA from [facility] for psychiatric evaluation after smoking inside the home and stealing someone's soda.   Patient has a blunted affect, is poorly related,  but is cooperative with interview. States he smoked inside and stole someone's soda and thus ems was called. he denies any altercation, aggression, self harm or violence.  He denies any violence or aggression towards others. his only complaint is that at his home, "the food sucks" and "they don't care."  He reports he has been taking his medications; he can name geodon and cogentin but no others off the top his head.  He denies any depressive sx's.  He denies any S/H I/I/P.    Concerning other psychiatric symptoms, pt denies violent behavior towards others. Pt denies any episodes of bizarre happiness, unusual energy, unusual nightime excitation or other common symptoms of sancho. Pt denies hearing voices or seeing things.  No delusions were elicited.  Patient denies pervasive anxiety,  panic attacks, obsessions or compulsions.    Called [place] Patient a 54 y/o male, unemployed, domiciled at [facility] for 15 months, no prior SA, hx of Schizophrenia, last hospitalized at [facility] in 1998 for 8 days for medical reason and he was last hospitalized psychiatrically in 1985, denied aggression or violence or any self mutilation habits, medically has DM with HLD was BIBA from [facility] for psychiatric evaluation after smoking inside the home and stealing someone's soda.   Patient has a blunted affect, is poorly related,  but is cooperative with interview. States he smoked inside and stole someone's soda and thus ems was called. he denies any altercation, aggression, self harm or violence.  He denies any violence or aggression towards others. his only complaint is that at his home, "the food sucks" and "they don't care."  He reports he has been taking his medications; he can name geodon and cogentin but no others off the top his head.  He denies any depressive sx's.  He denies any S/H I/I/P.    Concerning other psychiatric symptoms, pt denies violent behavior towards others. Pt denies any episodes of bizarre happiness, unusual energy, unusual nightime excitation or other common symptoms of sancho. Pt denies hearing voices or seeing things.  No delusions were elicited.  Patient denies pervasive anxiety,  panic attacks, obsessions or compulsions.    Called [facility] at [phone] for collateral, they asked that I call back at [phone] to speak with Stephanie, called back at that time and was told Stephanie or anyone that might know the patient/why the patient was sent to ED well would not be in until [phone].

## 2019-01-07 NOTE — ED BEHAVIORAL HEALTH ASSESSMENT NOTE - RISK ASSESSMENT
Low-Pt denies any stresssors, has been compliant with medications, no recent psychiatric hospitalizations, No prior suicide attempt, denies any current S/H I/I/P, no psychotic or anxiety sx's elicited, denies global insomnia, remains future oriented, reports he plans on following up with [facility] appointment, currently connected with treatment. Low-Pt denies any stresssors, has been compliant with medications, no recent psychiatric hospitalizations, No prior suicide attempt, denies any current S/H I/I/P

## 2019-01-25 ENCOUNTER — APPOINTMENT (OUTPATIENT)
Dept: PULMONOLOGY | Facility: CLINIC | Age: 54
End: 2019-01-25

## 2019-03-12 NOTE — ED BEHAVIORAL HEALTH ASSESSMENT NOTE - SUMMARY
[place] Patient a 52 y/o single  male, domiciled at [facility] with past psychiatric hx of COPD; DM; Glaucoma was BIB/EMS due to harassing peers at the residence for a cigarette smoke. Patient usually smokes a pack a day, and today smoked 16 cigarettes only so he felt the urge to have another smoke and was asking for a cigarette from peers and he continued to do so he was brought in for psychiatric evaluation. Patient a 52 y/o single  male, domiciled at [facility] with past psychiatric hx of COPD; DM; Glaucoma was BIB/EMS due to harassing peers at the residence for a cigarette smoke. Patient usually smokes a pack a day, and today smoked 6 cigarettes only so he felt the urge to have another smoke and was asking for a cigarette from peers and he continued to do so he was brought in for psychiatric evaluation.    He is complaint with meds, has B/L EPS involving his hands, denied drug abuse, labs are WNL. Not S/h at this time and would be discharged to [facility].    He would benefit from decreased dosage of Haldol from 30 mg to 20 mg Daily (10 mg BID) which would possibly decrease his aggressive outbursts.

## 2019-03-23 NOTE — ED ADULT NURSE REASSESSMENT NOTE - GASTROINTESTINAL ASSESSMENT
History of Present Illness


General


Chief Complaint:  Multiple Trauma/Fall


Source:  Patient





Present Illness


HPI


74-year-old male presents ED for evaluation.  Brought in by EMS.  Patient had a 

mechanical fall today.  Tripped while using his walker.  Plating of left hip 

pain.  States he had surgery on his right hip last month and has temitope in place.  

Had surgery here at [facility] in February.  Pain is sharp, 10 out of 10, 

nonradiating.  Unable to bear weight.  Denies any other injuries.  No other 

aggravating relieving factors.  Denies any other associated symptoms


Allergies:  


Coded Allergies:  


     TETRACYCLINE (Unverified  Allergy, Unknown, 3/23/19)





Patient History


Past Medical History:  COPD


Pertinent Family History:  none


Social History:  Denies: smoking, alcohol use, drug use


Immunizations:  UTD


Reviewed Nursing Documentation:  PMH: Agreed; PSxH: Agreed





Nursing Documentation-PMH


Past Medical History:  No Stated History


Hx COPD:  Yes





Review of Systems


All Other Systems:  negative except mentioned in HPI





Physical Exam





Vital Signs








  Date Time  Temp Pulse Resp B/P (MAP) Pulse Ox O2 Delivery O2 Flow Rate FiO2


 


3/23/19 17:33 98.1 73 16 133/67 99 Room Air  








Sp02 EP Interpretation:  reviewed, normal


General Appearance:  no apparent distress, alert, GCS 15, non-toxic


Head:  normocephalic, atraumatic


Eyes:  bilateral eye normal inspection, bilateral eye PERRL


ENT:  hearing grossly normal, normal pharynx, no angioedema, normal voice


Neck:  full range of motion, supple/symm/no masses


Respiratory:  chest non-tender, lungs clear, normal breath sounds, speaking 

full sentences


Cardiovascular #1:  regular rate, rhythm, no edema


Cardiovascular #2:  2+ carotid (R), 2+ carotid (L), 2+ radial (R), 2+ radial (L)

, 2+ dorsalis pedis (R), 2+ dorsalis pedis (L)


Gastrointestinal:  normal bowel sounds, non tender, soft, non-distended, no 

guarding, no rebound


Rectal:  deferred


Genitourinary:  normal inspection, no CVA tenderness


Musculoskeletal:  back normal, gait/station normal, decreased range of motion, 

tender - bilateral Hip


Neurologic:  alert, oriented x3, responsive, motor strength/tone normal, 

sensory intact, speech normal


Psychiatric:  judgement/insight normal, memory normal, mood/affect normal, no 

suicidal/homicidal ideation


Reflexes:  3+ bicep (R), 3+ bicep (L), 3+ tricep (R), 3+ tricep (L), 3+ knee (R)

, 3+ knee (L)


Skin:  normal color, no rash, warm/dry, well hydrated


Lymphatic:  no adenopathy





Medical Decision Making


Diagnostic Impression:  


 Primary Impression:  


 Multiple injuries due to trauma


 Additional Impression:  


 Fracture of superior pubic ramus


 Qualified Codes:  S32.512A - Fracture of superior rim of left pubis, initial 

encounter for closed fracture


ER Course


Hospital Course 


74-year-old male presents to ED with L hip pain s/p fall





Differential diagnoses include: fracture, dislocation, contusion 





Clinical course


Patient placed on stretcher.  After initial history and physical I ordered labs

, pain medication and imaging studies





Labs reviewed- no leukocytosis noted, electrolytes okay, hemoglobin/hematocrit 

okay


Left hip x-ray shows superior pubic rami fracture extending to pubic symphysis.

  Right hip x-ray shows hardware in place with no acute fracture





Discussed case with Dr. Charles (orthopedics); states that this is not an 

operative case.  However patient will likely have problems walking given 

problems with his right hip





patient states due to continued problems with his right hip he is unable to 

bear weight on the left.  Normally uses a walker.  I do not believe it is safe 

for discharge at this time.





Case discussed with Dr. Davenport who agreed to accept the patient to his 

service for further care and support





i.  I feel this is a highly complex case requiring extensive working including 

EKG/Rhythm strip, Xray/CT/US, Blood/urine lab work, repeat exams while in ED, 

and administration of strong opiates/narcotics for pain control, admission to 

hospital or close patient follow up.  





Diagnosis - multiple injuries due to trauma, fracture of superior pubic rami





Admitted to floor in serious condition





Labs








Test


  3/23/19


18:05


 


White Blood Count


  3.7 K/UL


(4.8-10.8)


 


Red Blood Count


  4.00 M/UL


(4.70-6.10)


 


Hemoglobin


  13.5 G/DL


(14.2-18.0)


 


Hematocrit


  39.5 %


(42.0-52.0)


 


Mean Corpuscular Volume 99 FL (80-99) 


 


Mean Corpuscular Hemoglobin


  33.9 PG


(27.0-31.0)


 


Mean Corpuscular Hemoglobin


Concent 34.3 G/DL


(32.0-36.0)


 


Red Cell Distribution Width


  14.0 %


(11.6-14.8)


 


Platelet Count


  72 K/UL


(150-450)


 


Mean Platelet Volume


  5.6 FL


(6.5-10.1)


 


Neutrophils (%) (Auto)  % (45.0-75.0) 


 


Lymphocytes (%) (Auto)  % (20.0-45.0) 


 


Monocytes (%) (Auto)  % (1.0-10.0) 


 


Eosinophils (%) (Auto)  % (0.0-3.0) 


 


Basophils (%) (Auto)  % (0.0-2.0) 


 


Differential Total Cells


Counted 100 


 


 


Neutrophils % (Manual) 53 % (45-75) 


 


Lymphocytes % (Manual) 35 % (20-45) 


 


Monocytes % (Manual) 10 % (1-10) 


 


Eosinophils % (Manual) 0 % (0-3) 


 


Basophils % (Manual) 2 % (0-2) 


 


Band Neutrophils 0 % (0-8) 


 


Platelet Estimate Decreased 


 


Platelet Morphology Normal 


 


Red Blood Cell Morphology Normal 


 


Prothrombin Time


  12.7 SEC


(9.30-11.50)


 


Prothromb Time International


Ratio 1.2 (0.9-1.1) 


 


 


Activated Partial


Thromboplast Time 29 SEC (23-33) 


 


 


Sodium Level


  134 MMOL/L


(136-145)


 


Potassium Level


  4.2 MMOL/L


(3.5-5.1)


 


Chloride Level


  101 MMOL/L


()


 


Carbon Dioxide Level


  22 MMOL/L


(21-32)


 


Anion Gap


  11 mmol/L


(5-15)


 


Blood Urea Nitrogen


  18 mg/dL


(7-18)


 


Creatinine


  0.9 MG/DL


(0.55-1.30)


 


Estimat Glomerular Filtration


Rate  mL/min (>60) 


 


 


Glucose Level


  111 MG/DL


()


 


Calcium Level


  8.8 MG/DL


(8.5-10.1)


 


Total Bilirubin


  0.6 MG/DL


(0.2-1.0)


 


Aspartate Amino Transf


(AST/SGOT) 47 U/L (15-37) 


 


 


Alanine Aminotransferase


(ALT/SGPT) 40 U/L (12-78) 


 


 


Alkaline Phosphatase


  101 U/L


()


 


Total Protein


  7.7 G/DL


(6.4-8.2)


 


Albumin


  3.3 G/DL


(3.4-5.0)


 


Globulin 4.4 g/dL 


 


Albumin/Globulin Ratio 0.8 (1.0-2.7) 








Other X-Ray Diagnostic Results


Other X-Ray Diagnostic Results #1:  


   X-Ray ordered:  R hip


   # of Views/Limited Vs Complete:  2 View


   Indication:  Pain


   EP Interpretation:  Yes


   Interpretation:  no dislocation, no soft tissue swelling, no fractures, 

other - hardware in place


   Impression:  No acute disease


   Electronically Signed by:  Electronically signed by Jase Khoury MD


Other X-Ray Diagnostic Results #2:  


   X-Ray ordered:  L hip


   # of Views/Limited Vs Complete:  2 View


   Indication:  Pain


   EP Interpretation:  Yes


   Interpretation:  no dislocation, no soft tissue swelling, other - superior 

pubic rami fx extending into pubic symphysis


   Impression:  Other - fx


   Electronically Signed by:  Electronically signed by Jase Khoury MD





Last Vital Signs








  Date Time  Temp Pulse Resp B/P (MAP) Pulse Ox O2 Delivery O2 Flow Rate FiO2


 


3/23/19 19:05 98.1 82 18 141/65 97 Room Air  








Status:  improved


Disposition:  ADMITTED AS INPATIENT


Condition:  Serious











Jase Khoury MD Mar 23, 2019 20:30
WDL

## 2019-05-03 ENCOUNTER — APPOINTMENT (OUTPATIENT)
Dept: PULMONOLOGY | Facility: CLINIC | Age: 54
End: 2019-05-03
Payer: MEDICARE

## 2019-05-03 VITALS
BODY MASS INDEX: 25.19 KG/M2 | DIASTOLIC BLOOD PRESSURE: 60 MMHG | HEIGHT: 72 IN | OXYGEN SATURATION: 97 % | HEART RATE: 89 BPM | WEIGHT: 186 LBS | SYSTOLIC BLOOD PRESSURE: 112 MMHG

## 2019-05-03 DIAGNOSIS — K21.9 GASTRO-ESOPHAGEAL REFLUX DISEASE W/OUT ESOPHAGITIS: ICD-10-CM

## 2019-05-03 DIAGNOSIS — Z86.39 PERSONAL HISTORY OF OTHER ENDOCRINE, NUTRITIONAL AND METABOLIC DISEASE: ICD-10-CM

## 2019-05-03 DIAGNOSIS — Z86.69 PERSONAL HISTORY OF OTHER DISEASES OF THE NERVOUS SYSTEM AND SENSE ORGANS: ICD-10-CM

## 2019-05-03 DIAGNOSIS — Z82.49 FAMILY HISTORY OF ISCHEMIC HEART DISEASE AND OTHER DISEASES OF THE CIRCULATORY SYSTEM: ICD-10-CM

## 2019-05-03 DIAGNOSIS — Z86.59 PERSONAL HISTORY OF OTHER MENTAL AND BEHAVIORAL DISORDERS: ICD-10-CM

## 2019-05-03 PROCEDURE — 94664 DEMO&/EVAL PT USE INHALER: CPT | Mod: 59

## 2019-05-03 PROCEDURE — 85018 HEMOGLOBIN: CPT | Mod: QW

## 2019-05-03 PROCEDURE — 94727 GAS DIL/WSHOT DETER LNG VOL: CPT

## 2019-05-03 PROCEDURE — 94060 EVALUATION OF WHEEZING: CPT

## 2019-05-03 PROCEDURE — 94729 DIFFUSING CAPACITY: CPT

## 2019-05-03 PROCEDURE — 99204 OFFICE O/P NEW MOD 45 MIN: CPT | Mod: 25

## 2019-05-03 RX ORDER — INSULIN DETEMIR 100 [IU]/ML
100 INJECTION, SOLUTION SUBCUTANEOUS
Refills: 0 | Status: ACTIVE | COMMUNITY

## 2019-05-03 NOTE — REVIEW OF SYSTEMS
[Seizures] : seizures [Depression] : depression [Anxiety] : anxiety [Diabetes] : diabetes mellitus [Fever] : no fever [Chills] : no chills [Dry Eyes] : no dryness of the eyes [Eye Irritation] : no ~T irritation of the eyes [Nasal Congestion] : no nasal congestion [Epistaxis] : no nosebleeds [Postnasal Drip] : no postnasal drip [Sinus Problems] : no sinus problems [Cough] : no cough [Sputum] : not coughing up ~M sputum [Dyspnea] : no dyspnea [Chest Tightness] : no chest tightness [Pleuritic Pain] : no pleuritic pain [Wheezing] : no wheezing [Hypertension] : no ~T hypertension [Chest Discomfort] : no chest discomfort [Dysrhythmia] : no dysrhythmia [Edema] : ~T edema was not present [Murmurs] : no murmurs were heard [Palpitations] : no palpitations [Reflux] : no reflux [Itchy Eyes] : no itching of ~T the eyes [Hay Fever] : no hay fever [Nausea] : no nausea [Vomiting] : no vomiting [Constipation] : no constipation [Diarrhea] : no diarrhea [Dysuria] : no dysuria [Abdominal Pain] : no abdominal pain [Anemia] : no anemia [Headache] : no headache [Syncope] : no fainting [Numbness] : no numbness [Dizziness] : no dizziness [Paralysis] : no paralysis was seen [Snoring] : no snoring [Thyroid Problem] : no thyroid problem [Witnessed Apneas] : demonstrated no ~M apnea [Nonrestorative Sleep] : restorative sleep

## 2019-05-03 NOTE — HISTORY OF PRESENT ILLNESS
[Initial Eval - Existing Diagnosis] : an initial evaluation of an existing diagnosis of [Dyspnea on Exertion] : dyspnea on exertion [Currently Experiencing] : The patient is currently experiencing symptoms. [None] : No associated symptoms are reported [Short-Acting Beta Agonists] : short-acting beta agonists [Long-Acting Beta Agonists] : long-acting beta agonists [Inhaled Corticosteroids] : inhaled corticosteroids [Good Compliance] : good compliance with treatment [Good Tolerance] : good tolerance of treatment [Good Symptom Control] : good symptom control [FreeTextEntry1] : The patient was recently (2-3/2019) admitted to [facility] ICU after being intubated for seizure disorder. Prior to that, he appeared to have a pneumonia which subsequently resolved. He was previously in [facility] for a COPD exacerbation one year ago (5/2018). \par He is a current smoker of up to 2.5 ppd x 18 years though is now down to 1/2 ppd. I spent > 3 min discussing the risks of smoking and the benefits and therapy for smoking cessation including nicotine replacement, medications, and programs.\par He denies sleep complaints. [Wheezing] : no wheezing [Non-Productive Cough] : no non-productive cough

## 2019-05-03 NOTE — PROCEDURE
[FreeTextEntry1] : PFTs 5/3/19 - Mildly reduced FVC and moderately reduced FEV1 with an obstructive pattern and no significant BD response. Lung volumes were near normal though with evidence of gas trapping given an elevated RV. Diffusion capacity is mildly reduced.

## 2019-05-03 NOTE — PHYSICAL EXAM
[General Appearance - Well Developed] : well developed [Normal Appearance] : normal appearance [General Appearance - In No Acute Distress] : no acute distress [Normal Conjunctiva] : the conjunctiva exhibited no abnormalities [Normal Oropharynx] : normal oropharynx [Neck Appearance] : the appearance of the neck was normal [Heart Sounds] : normal S1 and S2 [Murmurs] : no murmurs present [Heart Rate And Rhythm] : heart rate and rhythm were normal [] : no respiratory distress [Edema] : no peripheral edema present [Respiration, Rhythm And Depth] : normal respiratory rhythm and effort [Exaggerated Use Of Accessory Muscles For Inspiration] : no accessory muscle use [Auscultation Breath Sounds / Voice Sounds] : lungs were clear to auscultation bilaterally [Abnormal Walk] : normal gait [Abdomen Tenderness] : non-tender [Abdomen Soft] : soft [Cyanosis, Localized] : no localized cyanosis [Nail Clubbing] : no clubbing of the fingernails [No Focal Deficits] : no focal deficits [Oriented To Time, Place, And Person] : oriented to person, place, and time [FreeTextEntry1] : No abnormalities.

## 2019-05-03 NOTE — REASON FOR VISIT
[Initial Evaluation] : an initial evaluation [Abnormal CT Scan] : abnormal CT Scan [COPD] : COPD [Other: _____] : [unfilled] [FreeTextEntry2] : nicotine dependence

## 2019-05-03 NOTE — DISCUSSION/SUMMARY
[FreeTextEntry1] : \par #1. Pt with moderate COPD on spirometry\par #2. Continue Breo and would add Incruse daily\par #3. Albuterol or nebulizer Q6h as needed\par #4. Can continue Daliresp for now but would d/c theophylline\par #5. Smoking cessation is imperative\par #6. F/u in 3-4 months for re-evaluation\par #7. Control seizures per neurology

## 2019-05-03 NOTE — CONSULT LETTER
[Please see my note below.] : Please see my note below. [Consult Letter:] : I had the pleasure of evaluating your patient, [unfilled]. [Dear  ___] : Dear  [unfilled], [Consult Closing:] : Thank you very much for allowing me to participate in the care of this patient.  If you have any questions, please do not hesitate to contact me. [Sincerely,] : Sincerely, [FreeTextEntry3] : Carlos Gomez MD, FCCP, MARY ELLEN. ABSM\par

## 2019-06-17 ENCOUNTER — APPOINTMENT (OUTPATIENT)
Dept: PULMONOLOGY | Facility: CLINIC | Age: 54
End: 2019-06-17

## 2019-06-17 ENCOUNTER — INPATIENT (INPATIENT)
Facility: HOSPITAL | Age: 54
LOS: 8 days | Discharge: HOME | DRG: 190 | End: 2019-06-25
Attending: INTERNAL MEDICINE | Admitting: HOSPITALIST
Payer: MEDICARE

## 2019-06-17 VITALS
DIASTOLIC BLOOD PRESSURE: 73 MMHG | SYSTOLIC BLOOD PRESSURE: 110 MMHG | HEIGHT: 73 IN | OXYGEN SATURATION: 95 % | TEMPERATURE: 100 F | RESPIRATION RATE: 22 BRPM | HEART RATE: 99 BPM | WEIGHT: 175.05 LBS

## 2019-06-17 DIAGNOSIS — I10 ESSENTIAL (PRIMARY) HYPERTENSION: ICD-10-CM

## 2019-06-17 DIAGNOSIS — E11.9 TYPE 2 DIABETES MELLITUS WITHOUT COMPLICATIONS: ICD-10-CM

## 2019-06-17 DIAGNOSIS — J44.1 CHRONIC OBSTRUCTIVE PULMONARY DISEASE WITH (ACUTE) EXACERBATION: ICD-10-CM

## 2019-06-17 DIAGNOSIS — F20.9 SCHIZOPHRENIA, UNSPECIFIED: ICD-10-CM

## 2019-06-17 DIAGNOSIS — R06.02 SHORTNESS OF BREATH: ICD-10-CM

## 2019-06-17 DIAGNOSIS — R09.02 HYPOXEMIA: ICD-10-CM

## 2019-06-17 DIAGNOSIS — J20.8 ACUTE BRONCHITIS DUE TO OTHER SPECIFIED ORGANISMS: ICD-10-CM

## 2019-06-17 LAB
ALBUMIN SERPL ELPH-MCNC: 3.9 G/DL — SIGNIFICANT CHANGE UP (ref 3.3–5.2)
ALP SERPL-CCNC: 106 U/L — SIGNIFICANT CHANGE UP (ref 40–120)
ALT FLD-CCNC: <5 U/L — SIGNIFICANT CHANGE UP
ANION GAP SERPL CALC-SCNC: 11 MMOL/L — SIGNIFICANT CHANGE UP (ref 5–17)
ANISOCYTOSIS BLD QL: SLIGHT — SIGNIFICANT CHANGE UP
AST SERPL-CCNC: 16 U/L — SIGNIFICANT CHANGE UP
BASE EXCESS BLDA CALC-SCNC: 7.4 MMOL/L — HIGH (ref -2–2)
BASOPHILS # BLD AUTO: 0 K/UL — SIGNIFICANT CHANGE UP (ref 0–0.2)
BASOPHILS # BLD AUTO: 0 K/UL — SIGNIFICANT CHANGE UP (ref 0–0.2)
BASOPHILS NFR BLD AUTO: 0.1 % — SIGNIFICANT CHANGE UP (ref 0–2)
BILIRUB SERPL-MCNC: 0.4 MG/DL — SIGNIFICANT CHANGE UP (ref 0.4–2)
BLOOD GAS COMMENTS ARTERIAL: SIGNIFICANT CHANGE UP
BUN SERPL-MCNC: 10 MG/DL — SIGNIFICANT CHANGE UP (ref 8–20)
CALCIUM SERPL-MCNC: 9 MG/DL — SIGNIFICANT CHANGE UP (ref 8.6–10.2)
CHLORIDE SERPL-SCNC: 95 MMOL/L — LOW (ref 98–107)
CO2 SERPL-SCNC: 30 MMOL/L — HIGH (ref 22–29)
CREAT SERPL-MCNC: 0.58 MG/DL — SIGNIFICANT CHANGE UP (ref 0.5–1.3)
EOSINOPHIL # BLD AUTO: 0 K/UL — SIGNIFICANT CHANGE UP (ref 0–0.5)
EOSINOPHIL # BLD AUTO: 0 K/UL — SIGNIFICANT CHANGE UP (ref 0–0.5)
EOSINOPHIL NFR BLD AUTO: 0.2 % — SIGNIFICANT CHANGE UP (ref 0–5)
GAS PNL BLDA: SIGNIFICANT CHANGE UP
GLUCOSE BLDC GLUCOMTR-MCNC: 337 MG/DL — HIGH (ref 70–99)
GLUCOSE BLDC GLUCOMTR-MCNC: 418 MG/DL — HIGH (ref 70–99)
GLUCOSE BLDC GLUCOMTR-MCNC: 440 MG/DL — HIGH (ref 70–99)
GLUCOSE BLDC GLUCOMTR-MCNC: 487 MG/DL — CRITICAL HIGH (ref 70–99)
GLUCOSE SERPL-MCNC: 257 MG/DL — HIGH (ref 70–115)
HCO3 BLDA-SCNC: 31 MMOL/L — HIGH (ref 20–26)
HCT VFR BLD CALC: 35.8 % — LOW (ref 42–52)
HCT VFR BLD CALC: 36.6 % — LOW (ref 42–52)
HGB BLD-MCNC: 10.9 G/DL — LOW (ref 14–18)
HGB BLD-MCNC: 11.5 G/DL — LOW (ref 14–18)
HOROWITZ INDEX BLDA+IHG-RTO: SIGNIFICANT CHANGE UP
HYPOCHROMIA BLD QL: SLIGHT — SIGNIFICANT CHANGE UP
LACTATE BLDV-MCNC: 0.8 MMOL/L — SIGNIFICANT CHANGE UP (ref 0.5–2)
LYMPHOCYTES # BLD AUTO: 0.3 K/UL — LOW (ref 1–4.8)
LYMPHOCYTES # BLD AUTO: 1.5 K/UL — SIGNIFICANT CHANGE UP (ref 1–4.8)
LYMPHOCYTES # BLD AUTO: 2 % — LOW (ref 20–55)
LYMPHOCYTES # BLD AUTO: 8.6 % — LOW (ref 20–55)
MCHC RBC-ENTMCNC: 24.6 PG — LOW (ref 27–31)
MCHC RBC-ENTMCNC: 25.1 PG — LOW (ref 27–31)
MCHC RBC-ENTMCNC: 30.4 G/DL — LOW (ref 32–36)
MCHC RBC-ENTMCNC: 31.4 G/DL — LOW (ref 32–36)
MCV RBC AUTO: 79.9 FL — LOW (ref 80–94)
MCV RBC AUTO: 80.8 FL — SIGNIFICANT CHANGE UP (ref 80–94)
MICROCYTES BLD QL: SLIGHT — SIGNIFICANT CHANGE UP
MONOCYTES # BLD AUTO: 0.3 K/UL — SIGNIFICANT CHANGE UP (ref 0–0.8)
MONOCYTES # BLD AUTO: 1.5 K/UL — HIGH (ref 0–0.8)
MONOCYTES NFR BLD AUTO: 2 % — LOW (ref 3–10)
MONOCYTES NFR BLD AUTO: 8.8 % — SIGNIFICANT CHANGE UP (ref 3–10)
NEUTROPHILS # BLD AUTO: 13.8 K/UL — HIGH (ref 1.8–8)
NEUTROPHILS # BLD AUTO: 15.1 K/UL — HIGH (ref 1.8–8)
NEUTROPHILS NFR BLD AUTO: 82.1 % — HIGH (ref 37–73)
NEUTROPHILS NFR BLD AUTO: 94 % — HIGH (ref 37–73)
NEUTS BAND # BLD: 2 % — SIGNIFICANT CHANGE UP (ref 0–8)
NT-PROBNP SERPL-SCNC: 530 PG/ML — HIGH (ref 0–300)
PCO2 BLDA: 54 MMHG — HIGH (ref 35–45)
PH BLDA: 7.4 — SIGNIFICANT CHANGE UP (ref 7.35–7.45)
PLAT MORPH BLD: NORMAL — SIGNIFICANT CHANGE UP
PLATELET # BLD AUTO: 190 K/UL — SIGNIFICANT CHANGE UP (ref 150–400)
PLATELET # BLD AUTO: 198 K/UL — SIGNIFICANT CHANGE UP (ref 150–400)
PO2 BLDA: 55 MMHG — LOW (ref 83–108)
POIKILOCYTOSIS BLD QL AUTO: SLIGHT — SIGNIFICANT CHANGE UP
POTASSIUM SERPL-MCNC: 3.8 MMOL/L — SIGNIFICANT CHANGE UP (ref 3.5–5.3)
POTASSIUM SERPL-SCNC: 3.8 MMOL/L — SIGNIFICANT CHANGE UP (ref 3.5–5.3)
PROT SERPL-MCNC: 7.1 G/DL — SIGNIFICANT CHANGE UP (ref 6.6–8.7)
RAPID RVP RESULT: SIGNIFICANT CHANGE UP
RBC # BLD: 4.43 M/UL — LOW (ref 4.6–6.2)
RBC # BLD: 4.58 M/UL — LOW (ref 4.6–6.2)
RBC # FLD: 16.3 % — HIGH (ref 11–15.6)
RBC # FLD: 16.3 % — HIGH (ref 11–15.6)
RBC BLD AUTO: ABNORMAL
SAO2 % BLDA: 89 % — LOW (ref 95–99)
SODIUM SERPL-SCNC: 136 MMOL/L — SIGNIFICANT CHANGE UP (ref 135–145)
TROPONIN T SERPL-MCNC: <0.01 NG/ML — SIGNIFICANT CHANGE UP (ref 0–0.06)
WBC # BLD: 15.8 K/UL — HIGH (ref 4.8–10.8)
WBC # BLD: 16.9 K/UL — HIGH (ref 4.8–10.8)
WBC # FLD AUTO: 15.8 K/UL — HIGH (ref 4.8–10.8)
WBC # FLD AUTO: 16.9 K/UL — HIGH (ref 4.8–10.8)

## 2019-06-17 PROCEDURE — 99285 EMERGENCY DEPT VISIT HI MDM: CPT

## 2019-06-17 PROCEDURE — 71045 X-RAY EXAM CHEST 1 VIEW: CPT | Mod: 26

## 2019-06-17 PROCEDURE — 99223 1ST HOSP IP/OBS HIGH 75: CPT

## 2019-06-17 PROCEDURE — 93010 ELECTROCARDIOGRAM REPORT: CPT

## 2019-06-17 PROCEDURE — 12345: CPT | Mod: NC

## 2019-06-17 RX ORDER — BENZTROPINE MESYLATE 1 MG
1 TABLET ORAL THREE TIMES A DAY
Refills: 0 | Status: DISCONTINUED | OUTPATIENT
Start: 2019-06-17 | End: 2019-06-25

## 2019-06-17 RX ORDER — SIMVASTATIN 20 MG/1
20 TABLET, FILM COATED ORAL AT BEDTIME
Refills: 0 | Status: DISCONTINUED | OUTPATIENT
Start: 2019-06-17 | End: 2019-06-25

## 2019-06-17 RX ORDER — SACCHAROMYCES BOULARDII 250 MG
250 POWDER IN PACKET (EA) ORAL
Refills: 0 | Status: DISCONTINUED | OUTPATIENT
Start: 2019-06-17 | End: 2019-06-25

## 2019-06-17 RX ORDER — INSULIN GLARGINE 100 [IU]/ML
30 INJECTION, SOLUTION SUBCUTANEOUS AT BEDTIME
Refills: 0 | Status: DISCONTINUED | OUTPATIENT
Start: 2019-06-17 | End: 2019-06-18

## 2019-06-17 RX ORDER — THEOPHYLLINE ANHYDROUS 200 MG
600 TABLET, EXTENDED RELEASE 12 HR ORAL DAILY
Refills: 0 | Status: DISCONTINUED | OUTPATIENT
Start: 2019-06-17 | End: 2019-06-17

## 2019-06-17 RX ORDER — IPRATROPIUM/ALBUTEROL SULFATE 18-103MCG
3 AEROSOL WITH ADAPTER (GRAM) INHALATION EVERY 4 HOURS
Refills: 0 | Status: DISCONTINUED | OUTPATIENT
Start: 2019-06-17 | End: 2019-06-20

## 2019-06-17 RX ORDER — INSULIN LISPRO 100/ML
VIAL (ML) SUBCUTANEOUS
Refills: 0 | Status: DISCONTINUED | OUTPATIENT
Start: 2019-06-17 | End: 2019-06-24

## 2019-06-17 RX ORDER — SODIUM CHLORIDE 9 MG/ML
1000 INJECTION, SOLUTION INTRAVENOUS
Refills: 0 | Status: DISCONTINUED | OUTPATIENT
Start: 2019-06-17 | End: 2019-06-25

## 2019-06-17 RX ORDER — ALBUTEROL 90 UG/1
2.5 AEROSOL, METERED ORAL
Refills: 0 | Status: DISCONTINUED | OUTPATIENT
Start: 2019-06-17 | End: 2019-06-25

## 2019-06-17 RX ORDER — SODIUM CHLORIDE 9 MG/ML
3 INJECTION INTRAMUSCULAR; INTRAVENOUS; SUBCUTANEOUS EVERY 8 HOURS
Refills: 0 | Status: DISCONTINUED | OUTPATIENT
Start: 2019-06-17 | End: 2019-06-24

## 2019-06-17 RX ORDER — GLUCAGON INJECTION, SOLUTION 0.5 MG/.1ML
1 INJECTION, SOLUTION SUBCUTANEOUS ONCE
Refills: 0 | Status: DISCONTINUED | OUTPATIENT
Start: 2019-06-17 | End: 2019-06-25

## 2019-06-17 RX ORDER — IPRATROPIUM/ALBUTEROL SULFATE 18-103MCG
3 AEROSOL WITH ADAPTER (GRAM) INHALATION
Refills: 0 | Status: COMPLETED | OUTPATIENT
Start: 2019-06-17 | End: 2019-06-17

## 2019-06-17 RX ORDER — ACETAMINOPHEN 500 MG
650 TABLET ORAL ONCE
Refills: 0 | Status: COMPLETED | OUTPATIENT
Start: 2019-06-17 | End: 2019-06-17

## 2019-06-17 RX ORDER — DEXTROSE 50 % IN WATER 50 %
15 SYRINGE (ML) INTRAVENOUS ONCE
Refills: 0 | Status: DISCONTINUED | OUTPATIENT
Start: 2019-06-17 | End: 2019-06-25

## 2019-06-17 RX ORDER — CLONAZEPAM 1 MG
2 TABLET ORAL THREE TIMES A DAY
Refills: 0 | Status: DISCONTINUED | OUTPATIENT
Start: 2019-06-17 | End: 2019-06-20

## 2019-06-17 RX ORDER — NICOTINE POLACRILEX 2 MG
1 GUM BUCCAL DAILY
Refills: 0 | Status: DISCONTINUED | OUTPATIENT
Start: 2019-06-17 | End: 2019-06-25

## 2019-06-17 RX ORDER — DEXTROSE 50 % IN WATER 50 %
12.5 SYRINGE (ML) INTRAVENOUS ONCE
Refills: 0 | Status: DISCONTINUED | OUTPATIENT
Start: 2019-06-17 | End: 2019-06-25

## 2019-06-17 RX ORDER — THEOPHYLLINE ANHYDROUS 200 MG
600 TABLET, EXTENDED RELEASE 12 HR ORAL DAILY
Refills: 0 | Status: DISCONTINUED | OUTPATIENT
Start: 2019-06-17 | End: 2019-06-25

## 2019-06-17 RX ORDER — ONDANSETRON 8 MG/1
4 TABLET, FILM COATED ORAL EVERY 6 HOURS
Refills: 0 | Status: DISCONTINUED | OUTPATIENT
Start: 2019-06-17 | End: 2019-06-25

## 2019-06-17 RX ORDER — AZITHROMYCIN 500 MG/1
500 TABLET, FILM COATED ORAL ONCE
Refills: 0 | Status: COMPLETED | OUTPATIENT
Start: 2019-06-17 | End: 2019-06-17

## 2019-06-17 RX ORDER — ENOXAPARIN SODIUM 100 MG/ML
40 INJECTION SUBCUTANEOUS EVERY 24 HOURS
Refills: 0 | Status: DISCONTINUED | OUTPATIENT
Start: 2019-06-17 | End: 2019-06-25

## 2019-06-17 RX ORDER — QUETIAPINE FUMARATE 200 MG/1
300 TABLET, FILM COATED ORAL AT BEDTIME
Refills: 0 | Status: DISCONTINUED | OUTPATIENT
Start: 2019-06-17 | End: 2019-06-25

## 2019-06-17 RX ORDER — DEXTROSE 50 % IN WATER 50 %
25 SYRINGE (ML) INTRAVENOUS ONCE
Refills: 0 | Status: DISCONTINUED | OUTPATIENT
Start: 2019-06-17 | End: 2019-06-25

## 2019-06-17 RX ORDER — MONTELUKAST 4 MG/1
10 TABLET, CHEWABLE ORAL DAILY
Refills: 0 | Status: DISCONTINUED | OUTPATIENT
Start: 2019-06-17 | End: 2019-06-25

## 2019-06-17 RX ORDER — ZIPRASIDONE HYDROCHLORIDE 20 MG/1
20 CAPSULE ORAL
Refills: 0 | Status: DISCONTINUED | OUTPATIENT
Start: 2019-06-17 | End: 2019-06-24

## 2019-06-17 RX ORDER — AZITHROMYCIN 500 MG/1
500 TABLET, FILM COATED ORAL EVERY 24 HOURS
Refills: 0 | Status: DISCONTINUED | OUTPATIENT
Start: 2019-06-17 | End: 2019-06-19

## 2019-06-17 RX ORDER — CEFTRIAXONE 500 MG/1
1 INJECTION, POWDER, FOR SOLUTION INTRAMUSCULAR; INTRAVENOUS ONCE
Refills: 0 | Status: COMPLETED | OUTPATIENT
Start: 2019-06-17 | End: 2019-06-17

## 2019-06-17 RX ORDER — INSULIN LISPRO 100/ML
4 VIAL (ML) SUBCUTANEOUS
Refills: 0 | Status: DISCONTINUED | OUTPATIENT
Start: 2019-06-17 | End: 2019-06-18

## 2019-06-17 RX ORDER — ASPIRIN/CALCIUM CARB/MAGNESIUM 324 MG
81 TABLET ORAL DAILY
Refills: 0 | Status: DISCONTINUED | OUTPATIENT
Start: 2019-06-17 | End: 2019-06-25

## 2019-06-17 RX ADMIN — Medication 100 MILLIGRAM(S): at 13:05

## 2019-06-17 RX ADMIN — Medication 1 MILLIGRAM(S): at 13:05

## 2019-06-17 RX ADMIN — Medication 12: at 12:01

## 2019-06-17 RX ADMIN — AZITHROMYCIN 255 MILLIGRAM(S): 500 TABLET, FILM COATED ORAL at 06:50

## 2019-06-17 RX ADMIN — MONTELUKAST 10 MILLIGRAM(S): 4 TABLET, CHEWABLE ORAL at 09:25

## 2019-06-17 RX ADMIN — SODIUM CHLORIDE 3 MILLILITER(S): 9 INJECTION INTRAMUSCULAR; INTRAVENOUS; SUBCUTANEOUS at 21:51

## 2019-06-17 RX ADMIN — Medication 250 MILLIGRAM(S): at 09:25

## 2019-06-17 RX ADMIN — Medication 2 MILLIGRAM(S): at 13:04

## 2019-06-17 RX ADMIN — Medication 200 MILLIGRAM(S): at 13:05

## 2019-06-17 RX ADMIN — Medication 250 MILLIGRAM(S): at 16:14

## 2019-06-17 RX ADMIN — Medication 3 MILLILITER(S): at 03:06

## 2019-06-17 RX ADMIN — Medication 1 MILLIGRAM(S): at 09:25

## 2019-06-17 RX ADMIN — SIMVASTATIN 20 MILLIGRAM(S): 20 TABLET, FILM COATED ORAL at 21:50

## 2019-06-17 RX ADMIN — Medication 1 MILLIGRAM(S): at 21:50

## 2019-06-17 RX ADMIN — Medication 12: at 21:49

## 2019-06-17 RX ADMIN — SODIUM CHLORIDE 3 MILLILITER(S): 9 INJECTION INTRAMUSCULAR; INTRAVENOUS; SUBCUTANEOUS at 09:14

## 2019-06-17 RX ADMIN — CEFTRIAXONE 100 GRAM(S): 500 INJECTION, POWDER, FOR SOLUTION INTRAMUSCULAR; INTRAVENOUS at 05:30

## 2019-06-17 RX ADMIN — Medication 100 MILLIGRAM(S): at 21:50

## 2019-06-17 RX ADMIN — Medication 3 MILLILITER(S): at 20:41

## 2019-06-17 RX ADMIN — Medication 8: at 08:26

## 2019-06-17 RX ADMIN — Medication 4 UNIT(S): at 12:00

## 2019-06-17 RX ADMIN — Medication 2 MILLIGRAM(S): at 08:46

## 2019-06-17 RX ADMIN — Medication 3 MILLILITER(S): at 15:26

## 2019-06-17 RX ADMIN — Medication 40 MILLIGRAM(S): at 16:14

## 2019-06-17 RX ADMIN — Medication 125 MILLIGRAM(S): at 03:06

## 2019-06-17 RX ADMIN — QUETIAPINE FUMARATE 300 MILLIGRAM(S): 200 TABLET, FILM COATED ORAL at 21:50

## 2019-06-17 RX ADMIN — Medication 3 MILLILITER(S): at 03:40

## 2019-06-17 RX ADMIN — ENOXAPARIN SODIUM 40 MILLIGRAM(S): 100 INJECTION SUBCUTANEOUS at 16:16

## 2019-06-17 RX ADMIN — Medication 3 MILLILITER(S): at 03:20

## 2019-06-17 RX ADMIN — SODIUM CHLORIDE 3 MILLILITER(S): 9 INJECTION INTRAMUSCULAR; INTRAVENOUS; SUBCUTANEOUS at 12:02

## 2019-06-17 RX ADMIN — ZIPRASIDONE HYDROCHLORIDE 20 MILLIGRAM(S): 20 CAPSULE ORAL at 09:26

## 2019-06-17 RX ADMIN — Medication 3 MILLILITER(S): at 08:41

## 2019-06-17 RX ADMIN — INSULIN GLARGINE 30 UNIT(S): 100 INJECTION, SOLUTION SUBCUTANEOUS at 21:50

## 2019-06-17 RX ADMIN — Medication 40 MILLIGRAM(S): at 21:49

## 2019-06-17 RX ADMIN — Medication 81 MILLIGRAM(S): at 09:25

## 2019-06-17 RX ADMIN — ZIPRASIDONE HYDROCHLORIDE 20 MILLIGRAM(S): 20 CAPSULE ORAL at 16:14

## 2019-06-17 RX ADMIN — Medication 12: at 16:14

## 2019-06-17 RX ADMIN — Medication 600 MILLIGRAM(S): at 09:27

## 2019-06-17 RX ADMIN — Medication 650 MILLIGRAM(S): at 02:13

## 2019-06-17 RX ADMIN — Medication 4 UNIT(S): at 16:15

## 2019-06-17 RX ADMIN — Medication 40 MILLIGRAM(S): at 09:25

## 2019-06-17 NOTE — PROGRESS NOTE ADULT - ASSESSMENT
53 yr old male with hypertension, diabetes mellitus, schizophrenia, COPD on home oxygen presented with complaints of shortness of breath, productive yellow sputum and increasing oxygen requirements. Patient is a resident of a group home with recent sick contacts. 53 yr old male with hypertension, diabetes mellitus, schizophrenia, COPD on home oxygen presented with complaints of shortness of breath, productive yellow sputum and increasing oxygen requirements. Patient is a resident of a group home with recent sick contacts. IV steroids and antibiotics were initiated.

## 2019-06-17 NOTE — ED ADULT NURSE NOTE - AS SC BRADEN NUTRITION
Since, there has been no further activity within this encounter in the past +8 months; closed encounter.   (4) excellent

## 2019-06-17 NOTE — H&P ADULT - ASSESSMENT
54 y/o male with acute exacerbation of COPD, likely viral bronchitis, acute on chronic hypoxia, DM-2, HTN, HLD, Schizophrenia

## 2019-06-17 NOTE — PROGRESS NOTE ADULT - PROBLEM SELECTOR PLAN 4
Continue Lantus 30 units, add pre meal insulin 4 units, hyperglycemia in setting of steroids. Monitor fingersticks.

## 2019-06-17 NOTE — PROGRESS NOTE ADULT - SUBJECTIVE AND OBJECTIVE BOX
INTERVAL HPI/OVERNIGHT EVENTS:    CC:        Vital Signs Last 24 Hrs  T(C): 36.7 (17 Jun 2019 05:36), Max: 37.8 (17 Jun 2019 00:02)  T(F): 98 (17 Jun 2019 05:36), Max: 100.1 (17 Jun 2019 00:02)  HR: 82 (17 Jun 2019 05:36) (82 - 99)  BP: 112/61 (17 Jun 2019 05:36) (110/73 - 112/61)  BP(mean): 78 (17 Jun 2019 05:36) (78 - 78)  RR: 22 (17 Jun 2019 07:00) (22 - 32)  SpO2: 94% (17 Jun 2019 07:00) (87% - 95%)    PHYSICAL EXAM:    GENERAL:   CHEST/LUNG:  HEART:   ABDOMEN:   EXTREMITIES:     MEDICATIONS  (STANDING):  ALBUTerol/ipratropium for Nebulization 3 milliLiter(s) Nebulizer every 4 hours  aspirin enteric coated 81 milliGRAM(s) Oral daily  azithromycin  IVPB 500 milliGRAM(s) IV Intermittent every 24 hours  benztropine 1 milliGRAM(s) Oral three times a day  dextrose 5%. 1000 milliLiter(s) (50 mL/Hr) IV Continuous <Continuous>  dextrose 50% Injectable 12.5 Gram(s) IV Push once  dextrose 50% Injectable 25 Gram(s) IV Push once  dextrose 50% Injectable 25 Gram(s) IV Push once  enoxaparin Injectable 40 milliGRAM(s) SubCutaneous every 24 hours  insulin glargine Injectable (LANTUS) 30 Unit(s) SubCutaneous at bedtime  insulin lispro (HumaLOG) corrective regimen sliding scale   SubCutaneous Before meals and at bedtime  methylPREDNISolone sodium succinate Injectable 40 milliGRAM(s) IV Push every 6 hours  montelukast 10 milliGRAM(s) Oral daily  nicotine - 21 mG/24Hr(s) Patch 1 patch Transdermal daily  QUEtiapine 300 milliGRAM(s) Oral at bedtime  saccharomyces boulardii 250 milliGRAM(s) Oral two times a day  simvastatin 20 milliGRAM(s) Oral at bedtime  sodium chloride 0.9% lock flush 3 milliLiter(s) IV Push every 8 hours  theophylline ER Capsule 600 milliGRAM(s) Oral daily  ziprasidone 20 milliGRAM(s) Oral two times a day    MEDICATIONS  (PRN):  ALBUTerol    0.083% 2.5 milliGRAM(s) Nebulizer every 2 hours PRN Shortness of Breath and/or Wheezing  aluminum hydroxide/magnesium hydroxide/simethicone Suspension 30 milliLiter(s) Oral every 4 hours PRN Dyspepsia  clonazePAM  Tablet 2 milliGRAM(s) Oral three times a day PRN anxiety  dextrose 40% Gel 15 Gram(s) Oral once PRN Blood Glucose LESS THAN 70 milliGRAM(s)/deciliter  glucagon  Injectable 1 milliGRAM(s) IntraMuscular once PRN Glucose LESS THAN 70 milligrams/deciliter  ondansetron Injectable 4 milliGRAM(s) IV Push every 6 hours PRN Nausea      Allergies    Haldol (Dystonic RXN)    Intolerances          LABS:                          11.5   16.9  )-----------( 198      ( 17 Jun 2019 02:26 )             36.6     06-17    136  |  95<L>  |  10.0  ----------------------------<  257<H>  3.8   |  30.0<H>  |  0.58    Ca    9.0      17 Jun 2019 02:26    TPro  7.1  /  Alb  3.9  /  TBili  0.4  /  DBili  x   /  AST  16  /  ALT  <5  /  AlkPhos  106  06-17          RADIOLOGY & ADDITIONAL TESTS: INTERVAL HPI/OVERNIGHT EVENTS:    CC: COPD exacerbation, hypertension, hyperlipidemia schizophrenia, diabetes mellitus    Chart reviewed. + cough with white phlegm, reports was prescribed home oxygen a month ago and has been using it as prn.    Vital Signs Last 24 Hrs  T(C): 36.7 (17 Jun 2019 05:36), Max: 37.8 (17 Jun 2019 00:02)  T(F): 98 (17 Jun 2019 05:36), Max: 100.1 (17 Jun 2019 00:02)  HR: 82 (17 Jun 2019 05:36) (82 - 99)  BP: 112/61 (17 Jun 2019 05:36) (110/73 - 112/61)  BP(mean): 78 (17 Jun 2019 05:36) (78 - 78)  RR: 22 (17 Jun 2019 07:00) (22 - 32)  SpO2: 94% (17 Jun 2019 07:00) (87% - 95%)    PHYSICAL EXAM:    GENERAL: alert, not in distress  CHEST/LUNG: b/l air entry, coarse breath sounds, + wheezing.  HEART: regular  ABDOMEN: soft, BS+  EXTREMITIES: no edema, tenderness    MEDICATIONS  (STANDING):  ALBUTerol/ipratropium for Nebulization 3 milliLiter(s) Nebulizer every 4 hours  aspirin enteric coated 81 milliGRAM(s) Oral daily  azithromycin  IVPB 500 milliGRAM(s) IV Intermittent every 24 hours  benztropine 1 milliGRAM(s) Oral three times a day  dextrose 5%. 1000 milliLiter(s) (50 mL/Hr) IV Continuous <Continuous>  dextrose 50% Injectable 12.5 Gram(s) IV Push once  dextrose 50% Injectable 25 Gram(s) IV Push once  dextrose 50% Injectable 25 Gram(s) IV Push once  enoxaparin Injectable 40 milliGRAM(s) SubCutaneous every 24 hours  insulin glargine Injectable (LANTUS) 30 Unit(s) SubCutaneous at bedtime  insulin lispro (HumaLOG) corrective regimen sliding scale   SubCutaneous Before meals and at bedtime  methylPREDNISolone sodium succinate Injectable 40 milliGRAM(s) IV Push every 6 hours  montelukast 10 milliGRAM(s) Oral daily  nicotine - 21 mG/24Hr(s) Patch 1 patch Transdermal daily  QUEtiapine 300 milliGRAM(s) Oral at bedtime  saccharomyces boulardii 250 milliGRAM(s) Oral two times a day  simvastatin 20 milliGRAM(s) Oral at bedtime  sodium chloride 0.9% lock flush 3 milliLiter(s) IV Push every 8 hours  theophylline ER Capsule 600 milliGRAM(s) Oral daily  ziprasidone 20 milliGRAM(s) Oral two times a day    MEDICATIONS  (PRN):  ALBUTerol    0.083% 2.5 milliGRAM(s) Nebulizer every 2 hours PRN Shortness of Breath and/or Wheezing  aluminum hydroxide/magnesium hydroxide/simethicone Suspension 30 milliLiter(s) Oral every 4 hours PRN Dyspepsia  clonazePAM  Tablet 2 milliGRAM(s) Oral three times a day PRN anxiety  dextrose 40% Gel 15 Gram(s) Oral once PRN Blood Glucose LESS THAN 70 milliGRAM(s)/deciliter  glucagon  Injectable 1 milliGRAM(s) IntraMuscular once PRN Glucose LESS THAN 70 milligrams/deciliter  ondansetron Injectable 4 milliGRAM(s) IV Push every 6 hours PRN Nausea      Allergies    Haldol (Dystonic RXN)    Intolerances          LABS:                          11.5   16.9  )-----------( 198      ( 17 Jun 2019 02:26 )             36.6     06-17    136  |  95<L>  |  10.0  ----------------------------<  257<H>  3.8   |  30.0<H>  |  0.58    Ca    9.0      17 Jun 2019 02:26    TPro  7.1  /  Alb  3.9  /  TBili  0.4  /  DBili  x   /  AST  16  /  ALT  <5  /  AlkPhos  106  06-17          RADIOLOGY & ADDITIONAL TESTS:

## 2019-06-17 NOTE — ED ADULT NURSE REASSESSMENT NOTE - NS ED NURSE REASSESS COMMENT FT1
Sp02 between 89 and 93% on 4L NC. Santos Pradhan made aware, no s/s of respiratory distress noted. Awaiting MD Mcconnell.

## 2019-06-17 NOTE — ED ADULT NURSE NOTE - OBJECTIVE STATEMENT
assumed care of patient at 220, alert and oriented x4, c/o increased SOB x1 week. pt reports he has a hx of copd. No s/s of respiratory distress noted, respirations even non labored, scattered wheezing heard bilaterally. PA Made aware. Pt on CM, NSR noted. Iv placed labs sent, medications given. pt reports he quit smoking this Saturday.

## 2019-06-17 NOTE — ED ADULT NURSE NOTE - NEURO WDL
Alert and oriented to person, place and time, memory intact, behavior appropriate to situation, PERRL. Hx of schizophrenia

## 2019-06-17 NOTE — ED PROVIDER NOTE - OBJECTIVE STATEMENT
Patient is a 54 y/o male with a pmhx of COPD (on home oxygen 2L) BIBA from [facility] for SOB x one week. Patient denies chest pain with the SOB. Patient has been experiencing cough with low grade fever as well. Patient states currently on eliquis and plavix. Patient denies calf pain, leg swelling. Patient denies headache, visual changes, neck pain, nausea, vomiting abd pain

## 2019-06-17 NOTE — PROGRESS NOTE ADULT - PROBLEM SELECTOR PLAN 1
Continue IV steroids, Duoneb. Supplemental oxygen. On Azithromycin. Check RVP. If no improvement, will consider pulmonary evaluation. Add Tessalon Perles. Continue IV steroids, Duoneb. Supplemental oxygen. On Azithromycin. Check RVP. If no improvement, will consider pulmonary evaluation. Add Tessalon Perle.

## 2019-06-17 NOTE — PROVIDER CONTACT NOTE (OTHER) - ACTION/TREATMENT ORDERED:
MD Pearce made aware. Will continue to monitor.
KATHY allison aware, continue with scheduled 12units of humalog and 30units of lantus

## 2019-06-17 NOTE — ED PROVIDER NOTE - PHYSICAL EXAMINATION
Const: Awake, alert and oriented.   HEENT: NC/AT. Moist mucous membranes.  Eyes: No scleral icterus. EOMI.  Neck:. Soft and supple. Full ROM without pain.  Cardiac: +S1/S2. No murmurs. Peripheral pulses 2+ and symmetric. No LE edema.  Resp: Speaking in full sentences. Wheezing noted in all lung bases   Abd: Soft, non-tender, non-distended. Normal bowel sounds in all 4 quadrants. No guarding or rebound.  Back: Spine midline and non-tender. No CVAT.  Skin: No rashes, abrasions or lacerations.  Lymph: No cervical lymphadenopathy.  Neuro: Awake, alert & oriented x 3. CN II-XII intact, Moves all extremities symmetrically.

## 2019-06-17 NOTE — H&P ADULT - HISTORY OF PRESENT ILLNESS
52 y/o male with history of COPD on home 02 2-3 liters, smokes 2.5 packs of cig per day, DM-2, Psychiatric disorder, HLD, sent in from [facility] for increasing SOB, cough with thick sputum, diffuse wheezing. Patient states multiple sick contacts in group home. Patient denies fever, chills, N/V, CP, /GI complaints. In the ED patient with full field wheezing, cough, and conversational dyspnea. Patient with low grade temp, leukocytosis. No infiltrate on CXR, lactate normal. Patient with mild improvement but still does not feel at baseline. Patient poor historian, denies any other complaints at this time

## 2019-06-17 NOTE — H&P ADULT - NSICDXPASTMEDICALHX_GEN_ALL_CORE_FT
PAST MEDICAL HISTORY:  Bronchitis     Depression     DM (diabetes mellitus)     Gastritis     GI bleed     Hypertension     Schizophrenia

## 2019-06-17 NOTE — H&P ADULT - PROBLEM SELECTOR PLAN 1
Admit to , supplemental 02 with goal sat of 88-92 based on hx of C02 retention, Pulse steroids, duoneb ATC and Alb prn, spirometer, DVT-P, OOB, ambulate, Probiotics

## 2019-06-17 NOTE — ED ADULT NURSE REASSESSMENT NOTE - NS ED NURSE REASSESS COMMENT FT1
respiratory status improved, SP02 94% on room air. Pt reports he feels much better. Awaiting bed assignment. Safety maintained.

## 2019-06-17 NOTE — ED PROVIDER NOTE - ATTENDING CONTRIBUTION TO CARE
52 yo M with hx of COPD with hx of 2 1/2 ppd x 44 yrs on home O2 presents to ED c/o increased cough with prod yellow sputum and SOB. Pt admits to quitting tob 2 days ago.  In ED pt with low grade fever   and hypoxic on 5L N/C with O2 sat 88%.  On exam pt awake and alert in no acute resp distress with harsh  cough.  Cor Reg, Lungs with with diffuse scattered rhonchi, Abd soft, NT, Ext no edema or cyanosis, CXR with no obvious infiltrates.  Rx nebs, IV steroids and Abx .  Case d/w Hospitalist and will admit

## 2019-06-17 NOTE — ED ADULT NURSE NOTE - CHIEF COMPLAINT QUOTE
BIBA from adult home c/o of worsening of SOB x1week, reports SOB woke pt from sleep. present tachypneic on 2L O2 NC, NAD

## 2019-06-18 LAB
ANION GAP SERPL CALC-SCNC: 12 MMOL/L — SIGNIFICANT CHANGE UP (ref 5–17)
ANION GAP SERPL CALC-SCNC: 13 MMOL/L — SIGNIFICANT CHANGE UP (ref 5–17)
APPEARANCE UR: CLEAR — SIGNIFICANT CHANGE UP
BILIRUB UR-MCNC: NEGATIVE — SIGNIFICANT CHANGE UP
BUN SERPL-MCNC: 21 MG/DL — HIGH (ref 8–20)
BUN SERPL-MCNC: 24 MG/DL — HIGH (ref 8–20)
CALCIUM SERPL-MCNC: 9.2 MG/DL — SIGNIFICANT CHANGE UP (ref 8.6–10.2)
CALCIUM SERPL-MCNC: 9.2 MG/DL — SIGNIFICANT CHANGE UP (ref 8.6–10.2)
CHLORIDE SERPL-SCNC: 91 MMOL/L — LOW (ref 98–107)
CHLORIDE SERPL-SCNC: 96 MMOL/L — LOW (ref 98–107)
CO2 SERPL-SCNC: 26 MMOL/L — SIGNIFICANT CHANGE UP (ref 22–29)
CO2 SERPL-SCNC: 27 MMOL/L — SIGNIFICANT CHANGE UP (ref 22–29)
COLOR SPEC: YELLOW — SIGNIFICANT CHANGE UP
CREAT SERPL-MCNC: 0.73 MG/DL — SIGNIFICANT CHANGE UP (ref 0.5–1.3)
CREAT SERPL-MCNC: 0.76 MG/DL — SIGNIFICANT CHANGE UP (ref 0.5–1.3)
DIFF PNL FLD: NEGATIVE — SIGNIFICANT CHANGE UP
EPI CELLS # UR: SIGNIFICANT CHANGE UP
GLUCOSE BLDC GLUCOMTR-MCNC: 294 MG/DL — HIGH (ref 70–99)
GLUCOSE BLDC GLUCOMTR-MCNC: 346 MG/DL — HIGH (ref 70–99)
GLUCOSE BLDC GLUCOMTR-MCNC: 457 MG/DL — CRITICAL HIGH (ref 70–99)
GLUCOSE BLDC GLUCOMTR-MCNC: 465 MG/DL — CRITICAL HIGH (ref 70–99)
GLUCOSE BLDC GLUCOMTR-MCNC: 479 MG/DL — CRITICAL HIGH (ref 70–99)
GLUCOSE BLDC GLUCOMTR-MCNC: 490 MG/DL — CRITICAL HIGH (ref 70–99)
GLUCOSE BLDC GLUCOMTR-MCNC: >530 MG/DL — CRITICAL HIGH (ref 70–99)
GLUCOSE BLDC GLUCOMTR-MCNC: >530 MG/DL — CRITICAL HIGH (ref 70–99)
GLUCOSE SERPL-MCNC: 491 MG/DL — HIGH (ref 70–115)
GLUCOSE SERPL-MCNC: 569 MG/DL — CRITICAL HIGH (ref 70–115)
GLUCOSE UR QL: 1000 MG/DL
HBA1C BLD-MCNC: 9.3 % — HIGH (ref 4–5.6)
HCT VFR BLD CALC: 35.3 % — LOW (ref 42–52)
HGB BLD-MCNC: 11.4 G/DL — LOW (ref 14–18)
KETONES UR-MCNC: NEGATIVE — SIGNIFICANT CHANGE UP
LEUKOCYTE ESTERASE UR-ACNC: ABNORMAL
MAGNESIUM SERPL-MCNC: 2 MG/DL — SIGNIFICANT CHANGE UP (ref 1.6–2.6)
MCHC RBC-ENTMCNC: 25.2 PG — LOW (ref 27–31)
MCHC RBC-ENTMCNC: 32.3 G/DL — SIGNIFICANT CHANGE UP (ref 32–36)
MCV RBC AUTO: 78.1 FL — LOW (ref 80–94)
MRSA PCR RESULT.: SIGNIFICANT CHANGE UP
NITRITE UR-MCNC: NEGATIVE — SIGNIFICANT CHANGE UP
PH UR: 6.5 — SIGNIFICANT CHANGE UP (ref 5–8)
PHOSPHATE SERPL-MCNC: 4.3 MG/DL — SIGNIFICANT CHANGE UP (ref 2.4–4.7)
PLATELET # BLD AUTO: 220 K/UL — SIGNIFICANT CHANGE UP (ref 150–400)
POTASSIUM SERPL-MCNC: 4.4 MMOL/L — SIGNIFICANT CHANGE UP (ref 3.5–5.3)
POTASSIUM SERPL-MCNC: 5.6 MMOL/L — HIGH (ref 3.5–5.3)
POTASSIUM SERPL-SCNC: 4.4 MMOL/L — SIGNIFICANT CHANGE UP (ref 3.5–5.3)
POTASSIUM SERPL-SCNC: 5.6 MMOL/L — HIGH (ref 3.5–5.3)
PROT UR-MCNC: 15 MG/DL
RBC # BLD: 4.52 M/UL — LOW (ref 4.6–6.2)
RBC # FLD: 16.6 % — HIGH (ref 11–15.6)
RBC CASTS # UR COMP ASSIST: SIGNIFICANT CHANGE UP /HPF (ref 0–4)
S AUREUS DNA NOSE QL NAA+PROBE: SIGNIFICANT CHANGE UP
SODIUM SERPL-SCNC: 131 MMOL/L — LOW (ref 135–145)
SODIUM SERPL-SCNC: 134 MMOL/L — LOW (ref 135–145)
SP GR SPEC: 1.01 — SIGNIFICANT CHANGE UP (ref 1.01–1.02)
UROBILINOGEN FLD QL: NEGATIVE MG/DL — SIGNIFICANT CHANGE UP
WBC # BLD: 13 K/UL — HIGH (ref 4.8–10.8)
WBC # FLD AUTO: 13 K/UL — HIGH (ref 4.8–10.8)
WBC UR QL: SIGNIFICANT CHANGE UP

## 2019-06-18 PROCEDURE — 99233 SBSQ HOSP IP/OBS HIGH 50: CPT

## 2019-06-18 PROCEDURE — 99223 1ST HOSP IP/OBS HIGH 75: CPT

## 2019-06-18 RX ORDER — INSULIN GLARGINE 100 [IU]/ML
40 INJECTION, SOLUTION SUBCUTANEOUS AT BEDTIME
Refills: 0 | Status: DISCONTINUED | OUTPATIENT
Start: 2019-06-18 | End: 2019-06-19

## 2019-06-18 RX ORDER — CHLORHEXIDINE GLUCONATE 213 G/1000ML
1 SOLUTION TOPICAL DAILY
Refills: 0 | Status: DISCONTINUED | OUTPATIENT
Start: 2019-06-18 | End: 2019-06-25

## 2019-06-18 RX ORDER — INSULIN LISPRO 100/ML
5 VIAL (ML) SUBCUTANEOUS ONCE
Refills: 0 | Status: COMPLETED | OUTPATIENT
Start: 2019-06-18 | End: 2019-06-18

## 2019-06-18 RX ORDER — CEFTRIAXONE 500 MG/1
1000 INJECTION, POWDER, FOR SOLUTION INTRAMUSCULAR; INTRAVENOUS ONCE
Refills: 0 | Status: COMPLETED | OUTPATIENT
Start: 2019-06-18 | End: 2019-06-18

## 2019-06-18 RX ORDER — CEFTRIAXONE 500 MG/1
INJECTION, POWDER, FOR SOLUTION INTRAMUSCULAR; INTRAVENOUS
Refills: 0 | Status: DISCONTINUED | OUTPATIENT
Start: 2019-06-19 | End: 2019-06-21

## 2019-06-18 RX ORDER — CLONAZEPAM 1 MG
1 TABLET ORAL ONCE
Refills: 0 | Status: DISCONTINUED | OUTPATIENT
Start: 2019-06-18 | End: 2019-06-18

## 2019-06-18 RX ORDER — CEFTRIAXONE 500 MG/1
1000 INJECTION, POWDER, FOR SOLUTION INTRAMUSCULAR; INTRAVENOUS EVERY 24 HOURS
Refills: 0 | Status: DISCONTINUED | OUTPATIENT
Start: 2019-06-19 | End: 2019-06-21

## 2019-06-18 RX ORDER — DOCUSATE SODIUM 100 MG
100 CAPSULE ORAL THREE TIMES A DAY
Refills: 0 | Status: DISCONTINUED | OUTPATIENT
Start: 2019-06-18 | End: 2019-06-25

## 2019-06-18 RX ORDER — INSULIN GLARGINE 100 [IU]/ML
15 INJECTION, SOLUTION SUBCUTANEOUS EVERY MORNING
Refills: 0 | Status: DISCONTINUED | OUTPATIENT
Start: 2019-06-19 | End: 2019-06-21

## 2019-06-18 RX ORDER — SODIUM CHLORIDE 9 MG/ML
2000 INJECTION INTRAMUSCULAR; INTRAVENOUS; SUBCUTANEOUS ONCE
Refills: 0 | Status: COMPLETED | OUTPATIENT
Start: 2019-06-18 | End: 2019-06-18

## 2019-06-18 RX ORDER — INSULIN LISPRO 100/ML
6 VIAL (ML) SUBCUTANEOUS ONCE
Refills: 0 | Status: COMPLETED | OUTPATIENT
Start: 2019-06-18 | End: 2019-06-18

## 2019-06-18 RX ORDER — INSULIN LISPRO 100/ML
10 VIAL (ML) SUBCUTANEOUS
Refills: 0 | Status: DISCONTINUED | OUTPATIENT
Start: 2019-06-18 | End: 2019-06-19

## 2019-06-18 RX ADMIN — CHLORHEXIDINE GLUCONATE 1 APPLICATION(S): 213 SOLUTION TOPICAL at 12:22

## 2019-06-18 RX ADMIN — INSULIN GLARGINE 40 UNIT(S): 100 INJECTION, SOLUTION SUBCUTANEOUS at 21:16

## 2019-06-18 RX ADMIN — SODIUM CHLORIDE 4000 MILLILITER(S): 9 INJECTION INTRAMUSCULAR; INTRAVENOUS; SUBCUTANEOUS at 08:52

## 2019-06-18 RX ADMIN — Medication 100 MILLIGRAM(S): at 21:16

## 2019-06-18 RX ADMIN — Medication 250 MILLIGRAM(S): at 16:49

## 2019-06-18 RX ADMIN — ZIPRASIDONE HYDROCHLORIDE 20 MILLIGRAM(S): 20 CAPSULE ORAL at 16:49

## 2019-06-18 RX ADMIN — SODIUM CHLORIDE 3 MILLILITER(S): 9 INJECTION INTRAMUSCULAR; INTRAVENOUS; SUBCUTANEOUS at 05:12

## 2019-06-18 RX ADMIN — ENOXAPARIN SODIUM 40 MILLIGRAM(S): 100 INJECTION SUBCUTANEOUS at 16:49

## 2019-06-18 RX ADMIN — Medication 3 MILLILITER(S): at 11:32

## 2019-06-18 RX ADMIN — ONDANSETRON 4 MILLIGRAM(S): 8 TABLET, FILM COATED ORAL at 21:47

## 2019-06-18 RX ADMIN — Medication 250 MILLIGRAM(S): at 05:12

## 2019-06-18 RX ADMIN — CEFTRIAXONE 100 MILLIGRAM(S): 500 INJECTION, POWDER, FOR SOLUTION INTRAMUSCULAR; INTRAVENOUS at 15:36

## 2019-06-18 RX ADMIN — Medication 3 MILLILITER(S): at 20:07

## 2019-06-18 RX ADMIN — SODIUM CHLORIDE 3 MILLILITER(S): 9 INJECTION INTRAMUSCULAR; INTRAVENOUS; SUBCUTANEOUS at 21:16

## 2019-06-18 RX ADMIN — Medication 6 UNIT(S): at 11:03

## 2019-06-18 RX ADMIN — Medication 10 UNIT(S): at 12:22

## 2019-06-18 RX ADMIN — Medication 40 MILLIGRAM(S): at 08:55

## 2019-06-18 RX ADMIN — Medication 40 MILLIGRAM(S): at 21:16

## 2019-06-18 RX ADMIN — SIMVASTATIN 20 MILLIGRAM(S): 20 TABLET, FILM COATED ORAL at 21:16

## 2019-06-18 RX ADMIN — Medication 6: at 21:15

## 2019-06-18 RX ADMIN — Medication 12: at 12:22

## 2019-06-18 RX ADMIN — Medication 1 MILLIGRAM(S): at 21:16

## 2019-06-18 RX ADMIN — Medication 1 MILLIGRAM(S): at 12:28

## 2019-06-18 RX ADMIN — Medication 40 MILLIGRAM(S): at 16:46

## 2019-06-18 RX ADMIN — Medication 12: at 08:07

## 2019-06-18 RX ADMIN — Medication 10 UNIT(S): at 16:46

## 2019-06-18 RX ADMIN — Medication 3 MILLILITER(S): at 16:22

## 2019-06-18 RX ADMIN — AZITHROMYCIN 255 MILLIGRAM(S): 500 TABLET, FILM COATED ORAL at 05:12

## 2019-06-18 RX ADMIN — Medication 3 MILLILITER(S): at 08:15

## 2019-06-18 RX ADMIN — MONTELUKAST 10 MILLIGRAM(S): 4 TABLET, CHEWABLE ORAL at 15:36

## 2019-06-18 RX ADMIN — Medication 3 MILLILITER(S): at 00:01

## 2019-06-18 RX ADMIN — QUETIAPINE FUMARATE 300 MILLIGRAM(S): 200 TABLET, FILM COATED ORAL at 21:16

## 2019-06-18 RX ADMIN — Medication 1 MILLIGRAM(S): at 16:48

## 2019-06-18 RX ADMIN — Medication 100 MILLIGRAM(S): at 15:36

## 2019-06-18 RX ADMIN — Medication 2 MILLIGRAM(S): at 16:54

## 2019-06-18 RX ADMIN — Medication 1 MILLIGRAM(S): at 05:12

## 2019-06-18 RX ADMIN — Medication 5 UNIT(S): at 09:17

## 2019-06-18 RX ADMIN — Medication 8: at 16:46

## 2019-06-18 RX ADMIN — SODIUM CHLORIDE 3 MILLILITER(S): 9 INJECTION INTRAMUSCULAR; INTRAVENOUS; SUBCUTANEOUS at 15:38

## 2019-06-18 RX ADMIN — Medication 40 MILLIGRAM(S): at 02:29

## 2019-06-18 RX ADMIN — ZIPRASIDONE HYDROCHLORIDE 20 MILLIGRAM(S): 20 CAPSULE ORAL at 05:12

## 2019-06-18 RX ADMIN — Medication 4 UNIT(S): at 08:07

## 2019-06-18 RX ADMIN — Medication 600 MILLIGRAM(S): at 16:47

## 2019-06-18 RX ADMIN — Medication 100 MILLIGRAM(S): at 05:12

## 2019-06-18 RX ADMIN — Medication 81 MILLIGRAM(S): at 12:28

## 2019-06-18 NOTE — CONSULT NOTE ADULT - ASSESSMENT
T2DM uncontrolled   at group home pt onLAntus bid 30 untis   Willadd 15 units LAntus in AM    keep 40 units in PM and premeal Humalog 10 tid ac   will follow with you   COPD- pt praised for ecent smoking cessation

## 2019-06-18 NOTE — PROGRESS NOTE ADULT - ASSESSMENT
53 yr old male with hypertension, diabetes mellitus, schizophrenia, COPD on home oxygen presented with complaints of shortness of breath, productive yellow sputum and increasing oxygen requirements. Patient is a resident of a group home with recent sick contacts. IV steroids and antibiotics were initiated. He was noted to have uncontrolled diabetes, Lantus dose was adjusted. Pre meal insulin was added, endocrine evaluation was requested. Ceftriaxone was added.

## 2019-06-18 NOTE — PROGRESS NOTE ADULT - SUBJECTIVE AND OBJECTIVE BOX
INTERVAL HPI/OVERNIGHT EVENTS:    CC: community acquired pneumonia, uncontrolled DM, CODP exacerbation, schizophrenia    Events noted, uncontrolled blood sugars.    Vital Signs Last 24 Hrs  T(C): 36.4 (18 Jun 2019 09:15), Max: 36.5 (17 Jun 2019 20:17)  T(F): 97.5 (18 Jun 2019 09:15), Max: 97.7 (17 Jun 2019 20:17)  HR: 77 (18 Jun 2019 09:15) (63 - 95)  BP: 131/65 (18 Jun 2019 09:15) (102/65 - 131/65)  BP(mean): --  RR: 19 (18 Jun 2019 09:15) (18 - 23)  SpO2: 93% (18 Jun 2019 08:16) (91% - 94%)    PHYSICAL EXAM:    GENERAL: not in distress , alert  CHEST/LUNG: b/l air entry, mild wheezing.  HEART: Regular   ABDOMEN: Soft, BS+  EXTREMITIES:  No edema, tenderness.     MEDICATIONS  (STANDING):  ALBUTerol/ipratropium for Nebulization 3 milliLiter(s) Nebulizer every 4 hours  aspirin enteric coated 81 milliGRAM(s) Oral daily  azithromycin  IVPB 500 milliGRAM(s) IV Intermittent every 24 hours  benzonatate 100 milliGRAM(s) Oral every 8 hours  benztropine 1 milliGRAM(s) Oral three times a day  cefTRIAXone   IVPB 1000 milliGRAM(s) IV Intermittent once  cefTRIAXone   IVPB      chlorhexidine 2% Cloths 1 Application(s) Topical daily  dextrose 5%. 1000 milliLiter(s) (50 mL/Hr) IV Continuous <Continuous>  dextrose 50% Injectable 12.5 Gram(s) IV Push once  dextrose 50% Injectable 25 Gram(s) IV Push once  dextrose 50% Injectable 25 Gram(s) IV Push once  enoxaparin Injectable 40 milliGRAM(s) SubCutaneous every 24 hours  insulin glargine Injectable (LANTUS) 40 Unit(s) SubCutaneous at bedtime  insulin lispro (HumaLOG) corrective regimen sliding scale   SubCutaneous Before meals and at bedtime  insulin lispro Injectable (HumaLOG) 10 Unit(s) SubCutaneous three times a day with meals  methylPREDNISolone sodium succinate Injectable 40 milliGRAM(s) IV Push every 8 hours  montelukast 10 milliGRAM(s) Oral daily  nicotine - 21 mG/24Hr(s) Patch 1 patch Transdermal daily  QUEtiapine 300 milliGRAM(s) Oral at bedtime  saccharomyces boulardii 250 milliGRAM(s) Oral two times a day  simvastatin 20 milliGRAM(s) Oral at bedtime  sodium chloride 0.9% lock flush 3 milliLiter(s) IV Push every 8 hours  theophylline ER Capsule 600 milliGRAM(s) Oral daily  ziprasidone 20 milliGRAM(s) Oral two times a day    MEDICATIONS  (PRN):  ALBUTerol    0.083% 2.5 milliGRAM(s) Nebulizer every 2 hours PRN Shortness of Breath and/or Wheezing  aluminum hydroxide/magnesium hydroxide/simethicone Suspension 30 milliLiter(s) Oral every 4 hours PRN Dyspepsia  clonazePAM  Tablet 2 milliGRAM(s) Oral three times a day PRN anxiety  dextrose 40% Gel 15 Gram(s) Oral once PRN Blood Glucose LESS THAN 70 milliGRAM(s)/deciliter  glucagon  Injectable 1 milliGRAM(s) IntraMuscular once PRN Glucose LESS THAN 70 milligrams/deciliter  guaiFENesin    Syrup 200 milliGRAM(s) Oral every 6 hours PRN Cough  ondansetron Injectable 4 milliGRAM(s) IV Push every 6 hours PRN Nausea      Allergies    Haldol (Dystonic RXN)    Intolerances          LABS:                          11.4   13.0  )-----------( 220      ( 18 Jun 2019 07:44 )             35.3     06-18    134<L>  |  96<L>  |  21.0<H>  ----------------------------<  491<H>  4.4   |  26.0  |  0.73    Ca    9.2      18 Jun 2019 11:10  Phos  4.3     06-18  Mg     2.0     06-18    TPro  7.1  /  Alb  3.9  /  TBili  0.4  /  DBili  x   /  AST  16  /  ALT  <5  /  AlkPhos  106  06-17          RADIOLOGY & ADDITIONAL TESTS:

## 2019-06-18 NOTE — PROGRESS NOTE ADULT - PROBLEM SELECTOR PLAN 4
Uncontrolled DM in setting of steroid use, increase Lantus 40 units and pre meal to 10 units TID. Hyperkalemia noted in am labs sec hyperglycemia, resolved. s/p fluid bolus this am. Monitor FS, endocrine evaluation.

## 2019-06-18 NOTE — PROGRESS NOTE ADULT - PROBLEM SELECTOR PLAN 1
Taper steroids, continue Duoneb. Maintain sats around 91%.  CXR with infiltrates, community acquired pneumonia. Continue Azithromycin for atypicals and add Ceftriaxone for gram negatives.

## 2019-06-19 LAB
ANION GAP SERPL CALC-SCNC: 13 MMOL/L — SIGNIFICANT CHANGE UP (ref 5–17)
BUN SERPL-MCNC: 16 MG/DL — SIGNIFICANT CHANGE UP (ref 8–20)
CALCIUM SERPL-MCNC: 8.8 MG/DL — SIGNIFICANT CHANGE UP (ref 8.6–10.2)
CHLORIDE SERPL-SCNC: 89 MMOL/L — LOW (ref 98–107)
CO2 SERPL-SCNC: 33 MMOL/L — HIGH (ref 22–29)
CREAT SERPL-MCNC: 0.51 MG/DL — SIGNIFICANT CHANGE UP (ref 0.5–1.3)
GLUCOSE BLDC GLUCOMTR-MCNC: 148 MG/DL — HIGH (ref 70–99)
GLUCOSE BLDC GLUCOMTR-MCNC: 189 MG/DL — HIGH (ref 70–99)
GLUCOSE BLDC GLUCOMTR-MCNC: 195 MG/DL — HIGH (ref 70–99)
GLUCOSE BLDC GLUCOMTR-MCNC: 262 MG/DL — HIGH (ref 70–99)
GLUCOSE BLDC GLUCOMTR-MCNC: 295 MG/DL — HIGH (ref 70–99)
GLUCOSE BLDC GLUCOMTR-MCNC: 299 MG/DL — HIGH (ref 70–99)
GLUCOSE BLDC GLUCOMTR-MCNC: 363 MG/DL — HIGH (ref 70–99)
GLUCOSE SERPL-MCNC: 343 MG/DL — HIGH (ref 70–115)
HCT VFR BLD CALC: 36.2 % — LOW (ref 42–52)
HGB BLD-MCNC: 11.4 G/DL — LOW (ref 14–18)
MAGNESIUM SERPL-MCNC: 1.7 MG/DL — SIGNIFICANT CHANGE UP (ref 1.6–2.6)
MCHC RBC-ENTMCNC: 24.8 PG — LOW (ref 27–31)
MCHC RBC-ENTMCNC: 31.5 G/DL — LOW (ref 32–36)
MCV RBC AUTO: 78.9 FL — LOW (ref 80–94)
PLATELET # BLD AUTO: 243 K/UL — SIGNIFICANT CHANGE UP (ref 150–400)
POTASSIUM SERPL-MCNC: 4.2 MMOL/L — SIGNIFICANT CHANGE UP (ref 3.5–5.3)
POTASSIUM SERPL-SCNC: 4.2 MMOL/L — SIGNIFICANT CHANGE UP (ref 3.5–5.3)
RBC # BLD: 4.59 M/UL — LOW (ref 4.6–6.2)
RBC # FLD: 16.4 % — HIGH (ref 11–15.6)
SODIUM SERPL-SCNC: 135 MMOL/L — SIGNIFICANT CHANGE UP (ref 135–145)
WBC # BLD: 13.4 K/UL — HIGH (ref 4.8–10.8)
WBC # FLD AUTO: 13.4 K/UL — HIGH (ref 4.8–10.8)

## 2019-06-19 PROCEDURE — 99232 SBSQ HOSP IP/OBS MODERATE 35: CPT

## 2019-06-19 PROCEDURE — 99233 SBSQ HOSP IP/OBS HIGH 50: CPT

## 2019-06-19 RX ORDER — INSULIN GLARGINE 100 [IU]/ML
45 INJECTION, SOLUTION SUBCUTANEOUS AT BEDTIME
Refills: 0 | Status: DISCONTINUED | OUTPATIENT
Start: 2019-06-19 | End: 2019-06-25

## 2019-06-19 RX ORDER — SODIUM CHLORIDE 9 MG/ML
1000 INJECTION INTRAMUSCULAR; INTRAVENOUS; SUBCUTANEOUS
Refills: 0 | Status: DISCONTINUED | OUTPATIENT
Start: 2019-06-19 | End: 2019-06-20

## 2019-06-19 RX ORDER — SODIUM CHLORIDE 9 MG/ML
1000 INJECTION INTRAMUSCULAR; INTRAVENOUS; SUBCUTANEOUS ONCE
Refills: 0 | Status: COMPLETED | OUTPATIENT
Start: 2019-06-19 | End: 2019-06-19

## 2019-06-19 RX ORDER — INSULIN LISPRO 100/ML
14 VIAL (ML) SUBCUTANEOUS
Refills: 0 | Status: DISCONTINUED | OUTPATIENT
Start: 2019-06-19 | End: 2019-06-21

## 2019-06-19 RX ORDER — AZITHROMYCIN 500 MG/1
500 TABLET, FILM COATED ORAL EVERY 24 HOURS
Refills: 0 | Status: DISCONTINUED | OUTPATIENT
Start: 2019-06-19 | End: 2019-06-25

## 2019-06-19 RX ORDER — INSULIN HUMAN 100 [IU]/ML
10 INJECTION, SOLUTION SUBCUTANEOUS ONCE
Refills: 0 | Status: COMPLETED | OUTPATIENT
Start: 2019-06-19 | End: 2019-06-19

## 2019-06-19 RX ADMIN — Medication 100 MILLIGRAM(S): at 06:24

## 2019-06-19 RX ADMIN — Medication 30 MILLILITER(S): at 02:04

## 2019-06-19 RX ADMIN — ZIPRASIDONE HYDROCHLORIDE 20 MILLIGRAM(S): 20 CAPSULE ORAL at 17:16

## 2019-06-19 RX ADMIN — Medication 40 MILLIGRAM(S): at 17:16

## 2019-06-19 RX ADMIN — Medication 81 MILLIGRAM(S): at 11:59

## 2019-06-19 RX ADMIN — CEFTRIAXONE 100 MILLIGRAM(S): 500 INJECTION, POWDER, FOR SOLUTION INTRAMUSCULAR; INTRAVENOUS at 11:58

## 2019-06-19 RX ADMIN — INSULIN GLARGINE 15 UNIT(S): 100 INJECTION, SOLUTION SUBCUTANEOUS at 08:44

## 2019-06-19 RX ADMIN — Medication 250 MILLIGRAM(S): at 06:24

## 2019-06-19 RX ADMIN — SODIUM CHLORIDE 2000 MILLILITER(S): 9 INJECTION INTRAMUSCULAR; INTRAVENOUS; SUBCUTANEOUS at 05:26

## 2019-06-19 RX ADMIN — Medication 10 UNIT(S): at 07:59

## 2019-06-19 RX ADMIN — SODIUM CHLORIDE 100 MILLILITER(S): 9 INJECTION INTRAMUSCULAR; INTRAVENOUS; SUBCUTANEOUS at 16:04

## 2019-06-19 RX ADMIN — SODIUM CHLORIDE 3 MILLILITER(S): 9 INJECTION INTRAMUSCULAR; INTRAVENOUS; SUBCUTANEOUS at 22:28

## 2019-06-19 RX ADMIN — SIMVASTATIN 20 MILLIGRAM(S): 20 TABLET, FILM COATED ORAL at 22:28

## 2019-06-19 RX ADMIN — Medication 14 UNIT(S): at 17:15

## 2019-06-19 RX ADMIN — Medication 1 MILLIGRAM(S): at 22:28

## 2019-06-19 RX ADMIN — Medication 2: at 22:27

## 2019-06-19 RX ADMIN — Medication 3 MILLILITER(S): at 15:55

## 2019-06-19 RX ADMIN — Medication 1 MILLIGRAM(S): at 06:24

## 2019-06-19 RX ADMIN — Medication 40 MILLIGRAM(S): at 06:24

## 2019-06-19 RX ADMIN — ONDANSETRON 4 MILLIGRAM(S): 8 TABLET, FILM COATED ORAL at 03:55

## 2019-06-19 RX ADMIN — CHLORHEXIDINE GLUCONATE 1 APPLICATION(S): 213 SOLUTION TOPICAL at 12:00

## 2019-06-19 RX ADMIN — Medication 10 UNIT(S): at 11:58

## 2019-06-19 RX ADMIN — Medication 6: at 11:59

## 2019-06-19 RX ADMIN — SODIUM CHLORIDE 3 MILLILITER(S): 9 INJECTION INTRAMUSCULAR; INTRAVENOUS; SUBCUTANEOUS at 16:03

## 2019-06-19 RX ADMIN — Medication 2: at 07:59

## 2019-06-19 RX ADMIN — ZIPRASIDONE HYDROCHLORIDE 20 MILLIGRAM(S): 20 CAPSULE ORAL at 06:24

## 2019-06-19 RX ADMIN — INSULIN GLARGINE 45 UNIT(S): 100 INJECTION, SOLUTION SUBCUTANEOUS at 22:28

## 2019-06-19 RX ADMIN — ONDANSETRON 4 MILLIGRAM(S): 8 TABLET, FILM COATED ORAL at 17:14

## 2019-06-19 RX ADMIN — INSULIN HUMAN 10 UNIT(S): 100 INJECTION, SOLUTION SUBCUTANEOUS at 06:14

## 2019-06-19 RX ADMIN — Medication 250 MILLIGRAM(S): at 17:16

## 2019-06-19 RX ADMIN — Medication 2 MILLIGRAM(S): at 19:52

## 2019-06-19 RX ADMIN — MONTELUKAST 10 MILLIGRAM(S): 4 TABLET, CHEWABLE ORAL at 11:59

## 2019-06-19 RX ADMIN — Medication 3 MILLILITER(S): at 08:20

## 2019-06-19 RX ADMIN — SODIUM CHLORIDE 100 MILLILITER(S): 9 INJECTION INTRAMUSCULAR; INTRAVENOUS; SUBCUTANEOUS at 06:23

## 2019-06-19 RX ADMIN — Medication 3 MILLILITER(S): at 12:54

## 2019-06-19 RX ADMIN — Medication 3 MILLILITER(S): at 20:51

## 2019-06-19 RX ADMIN — ONDANSETRON 4 MILLIGRAM(S): 8 TABLET, FILM COATED ORAL at 11:59

## 2019-06-19 RX ADMIN — SODIUM CHLORIDE 3 MILLILITER(S): 9 INJECTION INTRAMUSCULAR; INTRAVENOUS; SUBCUTANEOUS at 06:24

## 2019-06-19 RX ADMIN — Medication 3 MILLILITER(S): at 00:01

## 2019-06-19 RX ADMIN — SODIUM CHLORIDE 100 MILLILITER(S): 9 INJECTION INTRAMUSCULAR; INTRAVENOUS; SUBCUTANEOUS at 12:00

## 2019-06-19 RX ADMIN — Medication 100 MILLIGRAM(S): at 22:28

## 2019-06-19 RX ADMIN — AZITHROMYCIN 255 MILLIGRAM(S): 500 TABLET, FILM COATED ORAL at 11:58

## 2019-06-19 RX ADMIN — Medication 1 MILLIGRAM(S): at 16:03

## 2019-06-19 RX ADMIN — Medication 100 MILLIGRAM(S): at 16:06

## 2019-06-19 RX ADMIN — QUETIAPINE FUMARATE 300 MILLIGRAM(S): 200 TABLET, FILM COATED ORAL at 22:28

## 2019-06-19 RX ADMIN — Medication 600 MILLIGRAM(S): at 11:59

## 2019-06-19 RX ADMIN — ENOXAPARIN SODIUM 40 MILLIGRAM(S): 100 INJECTION SUBCUTANEOUS at 17:15

## 2019-06-19 NOTE — CHART NOTE - NSCHARTNOTEFT_GEN_A_CORE
CC: hyperglycemia/nausea/ vomiting  INTERVAL HPI: Called by RN that Pt began to feel nauseas around 2am and vomited 4 times since then. Pt denies abdominal pain, epigastric pain, hematemesis, constipation, diarrhea, frequent urination, increased thirst, increased appetite or diaphoresis. No headaches, chest pain, difficulty breathing, sob or any other complaints. Pt seen and examined at bedside. Vomit in bag is brown in color.     REVIEW OF SYSTEMS:  CONSTITUTIONAL: No fever, weight loss, or fatigue  EYES: No eye pain, visual disturbances, or discharge  ENT:  No difficulty hearing, tinnitus, vertigo; No sinus or throat pain  NECK: No pain or stiffness  RESPIRATORY: No cough, wheezing, chills or hemoptysis; No shortness of breath  CARDIOVASCULAR: No chest pain, palpitations, dizziness, or leg swelling  GASTROINTESTINAL: see HPI  GENITOURINARY: No dysuria, frequency, hematuria, or incontinence  NEUROLOGICAL: No headaches, memory loss, loss of strength ornumbness   SKIN: No itching, burning, rashes, or lesions   MUSCULOSKELETAL: No joint pain or swelling; No muscle, back, or extremity pain    Allergies  Haldol (Dystonic RXN)    HEALTH ISSUES - PROBLEM Dx:  Schizophrenia, unspecified type: Schizophrenia, unspecified type  Essential hypertension: Essential hypertension  Type 2 diabetes mellitus without complication, with long-term current use of insulin: Type 2 diabetes mellitus without complication, with long-term current use of insulin  Viral bronchitis: Viral bronchitis  Hypoxia: Hypoxia  COPD exacerbation: COPD exacerbation    PAST MEDICAL & SURGICAL HISTORY:  Depression  GI bleed  Schizophrenia  Gastritis  Bronchitis  Hypertension  DM (diabetes mellitus)  No significant past surgical history    VITAL SIGNS:  T(C): 36.6 (19 @ 23:48), Max: 37.1 (19 @ 16:48)  HR: 74 (19 @ 00:01) (72 - 83)  BP: 117/73 (19 @ 23:48) (117/73 - 137/80)  RR: 20 (19 @ 23:48) (19 - 20)  SpO2: 96% (19 @ 00:01) (91% - 97%)  Wt(kg): --Vital Signs Last 24 Hrs  T(C): 36.6 (2019 23:48), Max: 37.1 (2019 16:48)  T(F): 97.8 (2019 23:48), Max: 98.8 (2019 16:48)  HR: 74 (2019 00:01) (72 - 83)  BP: 117/73 (2019 23:48) (117/73 - 137/80)  BP(mean): --  RR: 20 (2019 23:48) (19 - 20)  SpO2: 96% (2019 00:01) (91% - 97%)    PHYSICAL EXAM:  GENERAL: Nontoxic appearing, sitting up in bed comfortably in NAD  HEAD:  Atraumatic, Normocephalic  EYES: EOMI, PERRLA, conjunctiva and sclera clear  ENT: Moist mucous membranes  NECK: Supple, No JVD  CHEST/LUNG: Clear to auscultation bilaterally; No rales, rhonchi, wheezing, or rubs. Unlabored respirations  HEART: Regular rate and rhythm; No murmurs, rubs, or gallops  ABDOMEN: Bowel sounds present; Soft, Nontender, Nondistended  EXTREMITIES:  2+ Peripheral Pulses, brisk capillary refill. No clubbing, cyanosis, or edema  NERVOUS SYSTEM:  Alert & Oriented X3, speech clear, FROM x 4 extremities. Resting tremor noted at baseline. No deficits   SKIN: No rashes or lesions, warm and dry    LABS:               11.4   13.4  )-----------( 243      ( 2019 05:33 )             36.2     06-19    135  |  89<L>  |  16.0  ----------------------------<  343<H>  4.2   |  33.0<H>  |  0.51    Ca    8.8      2019 05:24  Phos  4.3     06-18  Mg     1.7     06-19    Urinalysis Basic - ( 2019 21:56 )  Color: Yellow / Appearance: Clear / S.010 / pH: x  Gluc: x / Ketone: Negative  / Bili: Negative / Urobili: Negative mg/dL   Blood: x / Protein: 15 mg/dL / Nitrite: Negative   Leuk Esterase: Trace / RBC: 0-2 /HPF / WBC 0-2   Sq Epi: x / Non Sq Epi: Few / Bacteria: x    CAPILLARY BLOOD GLUCOSE  POCT Blood Glucose.: 363 mg/dL (2019 04:15)  POCT Blood Glucose.: 295 mg/dL (2019 03:03)  POCT Blood Glucose.: 294 mg/dL (2019 21:05)  POCT Blood Glucose.: 346 mg/dL (2019 16:44)  POCT Blood Glucose.: 465 mg/dL (2019 12:21)  POCT Blood Glucose.: 457 mg/dL (2019 10:28)  POCT Blood Glucose.: 479 mg/dL (2019 09:04)  POCT Blood Glucose.: >530 mg/dL (2019 08:01)  POCT Blood Glucose.: 490 mg/dL (2019 08:00)  POCT Blood Glucose.: >530 mg/dL (2019 07:59)    Urinalysis Basic - ( 2019 21:56 )  Color: Yellow / Appearance: Clear / S.010 / pH: x  Gluc: x / Ketone: Negative  / Bili: Negative / Urobili: Negative mg/dL   Blood: x / Protein: 15 mg/dL / Nitrite: Negative   Leuk Esterase: Trace / RBC: 0-2 /HPF / WBC 0-2   Sq Epi: x / Non Sq Epi: Few / Bacteria: x    ASSESSMENT/ PLAN: 53 yr old male with hypertension, diabetes mellitus, schizophrenia, COPD on home oxygen admitted for CAP, COPD exacerbation, now with uncontrolled hyperglycemia in setting of uncontrolled DM and IV Solumedrol use. Endocrinology following. Lantus dose was increased from 30 to 40. Pre meal insulin was added. With new onset vomiting, concerned for DKA.   - Humulin 10mg IVP x1, 1L NS bolus, maintenance fluids initiated after bolus  - Consider weaning IV steroids in AM. Pt with clear lungs. 5AM dose of IV Azithromycin changed to be diluted in NS. Please consider changing Ceftriaxone dose to with NS instead of dextrose   - STAT CBC, BMP. Labs drawn prior to Reg insulin administration. f/u repeat finger stick  - Endocrine following and Pt to receive AM Lantus as well.   - UA noted from tonight around 2200. Negative in ketones but with glucose of 1000  - c/w zofran prn  - Case d/w Nocturnist Dr Myers. In agreement with plan, no further recommendations  -continue to observe closely and PA will reassess prn for worsening symptoms   - AM Hospitalist to be made aware CC: hyperglycemia/nausea/ vomiting  INTERVAL HPI: Called by RN that Pt began to feel nauseas around 2am and vomited 4 times since then. Pt denies abdominal pain, epigastric pain, hematemesis, constipation, diarrhea, frequent urination, increased thirst, increased appetite or diaphoresis. No headaches, chest pain, difficulty breathing, sob or any other complaints. Pt seen and examined at bedside. Vomit in bag is brown in color.     REVIEW OF SYSTEMS:  CONSTITUTIONAL: No fever, weight loss, or fatigue  EYES: No eye pain, visual disturbances, or discharge  ENT:  No difficulty hearing, tinnitus, vertigo; No sinus or throat pain  NECK: No pain or stiffness  RESPIRATORY: No cough, wheezing, chills or hemoptysis; No shortness of breath  CARDIOVASCULAR: No chest pain, palpitations, dizziness, or leg swelling  GASTROINTESTINAL: see HPI  GENITOURINARY: No dysuria, frequency, hematuria, or incontinence  NEUROLOGICAL: No headaches, memory loss, loss of strength or numbness   SKIN: No itching, burning, rashes, or lesions   MUSCULOSKELETAL: No joint pain or swelling; No muscle, back, or extremity pain    Allergies  Haldol (Dystonic RXN)    HEALTH ISSUES - PROBLEM Dx:  Schizophrenia, unspecified type: Schizophrenia, unspecified type  Essential hypertension: Essential hypertension  Type 2 diabetes mellitus without complication, with long-term current use of insulin: Type 2 diabetes mellitus without complication, with long-term current use of insulin  Viral bronchitis: Viral bronchitis  Hypoxia: Hypoxia  COPD exacerbation: COPD exacerbation    PAST MEDICAL & SURGICAL HISTORY:  Depression  GI bleed  Schizophrenia  Gastritis  Bronchitis  Hypertension  DM (diabetes mellitus)  No significant past surgical history    Vital Signs   T(F): 98.4  HR: 91  BP: 127/77  RR: 20   SpO2: 96%     PHYSICAL EXAM:  GENERAL: Nontoxic appearing, sitting up in bed comfortably in NAD  HEAD:  Atraumatic, Normocephalic  EYES: EOMI, PERRLA, conjunctiva and sclera clear  ENT: Moist mucous membranes  NECK: Supple, No JVD  CHEST/LUNG: Clear to auscultation bilaterally; No rales, rhonchi, wheezing, or rubs. Unlabored respirations  HEART: Regular rate and rhythm; No murmurs, rubs, or gallops  ABDOMEN: Bowel sounds present; Soft, Nontender, Nondistended  EXTREMITIES:  2+ Peripheral Pulses, brisk capillary refill. No clubbing, cyanosis, or edema  NERVOUS SYSTEM:  Alert & Oriented X3, speech clear, FROM x 4 extremities. Resting tremor noted at baseline. No deficits   SKIN: No rashes or lesions, warm and dry    LABS:               11.4   13.4  )-----------( 243      ( 2019 05:33 )             36.2     06-19    135  |  89<L>  |  16.0  ----------------------------<  343<H>  4.2   |  33.0<H>  |  0.51    Ca    8.8      2019 05:24  Phos  4.3     06-18  Mg     1.7     06-19    Urinalysis Basic - ( 2019 21:56 )  Color: Yellow / Appearance: Clear / S.010 / pH: x  Gluc: x / Ketone: Negative  / Bili: Negative / Urobili: Negative mg/dL   Blood: x / Protein: 15 mg/dL / Nitrite: Negative   Leuk Esterase: Trace / RBC: 0-2 /HPF / WBC 0-2   Sq Epi: x / Non Sq Epi: Few / Bacteria: x    CAPILLARY BLOOD GLUCOSE  POCT Blood Glucose.: 363 mg/dL (2019 04:15)  POCT Blood Glucose.: 295 mg/dL (2019 03:03)  POCT Blood Glucose.: 294 mg/dL (2019 21:05)  POCT Blood Glucose.: 346 mg/dL (2019 16:44)  POCT Blood Glucose.: 465 mg/dL (2019 12:21)  POCT Blood Glucose.: 457 mg/dL (2019 10:28)  POCT Blood Glucose.: 479 mg/dL (2019 09:04)  POCT Blood Glucose.: >530 mg/dL (2019 08:01)  POCT Blood Glucose.: 490 mg/dL (2019 08:00)  POCT Blood Glucose.: >530 mg/dL (2019 07:59)    Urinalysis Basic - ( 2019 21:56 )  Color: Yellow / Appearance: Clear / S.010 / pH: x  Gluc: x / Ketone: Negative  / Bili: Negative / Urobili: Negative mg/dL   Blood: x / Protein: 15 mg/dL / Nitrite: Negative   Leuk Esterase: Trace / RBC: 0-2 /HPF / WBC 0-2   Sq Epi: x / Non Sq Epi: Few / Bacteria: x    ASSESSMENT/ PLAN: 53 yr old male with hypertension, diabetes mellitus, schizophrenia, COPD on home oxygen admitted for CAP, COPD exacerbation, now with uncontrolled hyperglycemia in setting of uncontrolled DM and IV Solumedrol use. Endocrinology following. Lantus dose was increased from 30 to 40. Pre meal insulin was added. With new onset vomiting, concerned for DKA.   - Humulin 10mg IVP x1, 1L NS bolus, maintenance fluids initiated after bolus  - Consider weaning IV steroids in AM. Pt with clear lungs. 5AM dose of IV Azithromycin changed to be diluted in NS. Please consider changing Ceftriaxone dose to with NS instead of dextrose   - STAT CBC, BMP. Labs drawn prior to Reg insulin administration. f/u repeat finger stick  - Endocrine following and Pt to receive AM Lantus as well.   - UA noted from tonight around 2200. Negative in ketones but with glucose of 1000  - c/w zofran prn  - Case d/w Nocturnist Dr Myers. In agreement with plan, no further recommendations  - continue to observe closely and PA will reassess prn for worsening symptoms   - AM Hospitalist to be made aware

## 2019-06-19 NOTE — PROGRESS NOTE ADULT - SUBJECTIVE AND OBJECTIVE BOX
INTERVAL HPI/OVERNIGHT EVENTS:    CC: COPD exacerbation, diabetes mellitus, community acquired pneumonia    Events noted, had nausea and vomiting overnight, which is now resolved. Had breakfast without incident. Denies shortness of breath, cough better.     Vital Signs Last 24 Hrs  T(C): 36.8 (2019 07:53), Max: 37.1 (2019 16:48)  T(F): 98.3 (2019 07:53), Max: 98.8 (2019 16:48)  HR: 75 (2019 08:20) (72 - 91)  BP: 146/81 (2019 07:53) (117/73 - 146/81)  BP(mean): --  RR: 20 (2019 07:53) (20 - 20)  SpO2: 97% (2019 08:20) (91% - 98%)    PHYSICAL EXAM:    GENERAL: Not in distress, alert  CHEST/LUNG: mild wheezing, b/l air entry  HEART: Regular ]  ABDOMEN: Soft, BS+  EXTREMITIES: No edema, tenderness.    MEDICATIONS  (STANDING):  ALBUTerol/ipratropium for Nebulization 3 milliLiter(s) Nebulizer every 4 hours  aspirin enteric coated 81 milliGRAM(s) Oral daily  azithromycin  IVPB 500 milliGRAM(s) IV Intermittent every 24 hours  benztropine 1 milliGRAM(s) Oral three times a day  cefTRIAXone   IVPB 1000 milliGRAM(s) IV Intermittent every 24 hours  cefTRIAXone   IVPB      chlorhexidine 2% Cloths 1 Application(s) Topical daily  dextrose 5%. 1000 milliLiter(s) (50 mL/Hr) IV Continuous <Continuous>  dextrose 50% Injectable 12.5 Gram(s) IV Push once  dextrose 50% Injectable 25 Gram(s) IV Push once  dextrose 50% Injectable 25 Gram(s) IV Push once  docusate sodium 100 milliGRAM(s) Oral three times a day  enoxaparin Injectable 40 milliGRAM(s) SubCutaneous every 24 hours  insulin glargine Injectable (LANTUS) 15 Unit(s) SubCutaneous every morning  insulin glargine Injectable (LANTUS) 45 Unit(s) SubCutaneous at bedtime  insulin lispro (HumaLOG) corrective regimen sliding scale   SubCutaneous Before meals and at bedtime  insulin lispro Injectable (HumaLOG) 14 Unit(s) SubCutaneous three times a day with meals  methylPREDNISolone sodium succinate Injectable 40 milliGRAM(s) IV Push every 12 hours  montelukast 10 milliGRAM(s) Oral daily  nicotine - 21 mG/24Hr(s) Patch 1 patch Transdermal daily  QUEtiapine 300 milliGRAM(s) Oral at bedtime  saccharomyces boulardii 250 milliGRAM(s) Oral two times a day  simvastatin 20 milliGRAM(s) Oral at bedtime  sodium chloride 0.9% lock flush 3 milliLiter(s) IV Push every 8 hours  sodium chloride 0.9%. 1000 milliLiter(s) (100 mL/Hr) IV Continuous <Continuous>  theophylline ER Capsule 600 milliGRAM(s) Oral daily  ziprasidone 20 milliGRAM(s) Oral two times a day    MEDICATIONS  (PRN):  ALBUTerol    0.083% 2.5 milliGRAM(s) Nebulizer every 2 hours PRN Shortness of Breath and/or Wheezing  aluminum hydroxide/magnesium hydroxide/simethicone Suspension 30 milliLiter(s) Oral every 4 hours PRN Dyspepsia  clonazePAM  Tablet 2 milliGRAM(s) Oral three times a day PRN anxiety  dextrose 40% Gel 15 Gram(s) Oral once PRN Blood Glucose LESS THAN 70 milliGRAM(s)/deciliter  glucagon  Injectable 1 milliGRAM(s) IntraMuscular once PRN Glucose LESS THAN 70 milligrams/deciliter  guaiFENesin    Syrup 200 milliGRAM(s) Oral every 6 hours PRN Cough  ondansetron Injectable 4 milliGRAM(s) IV Push every 6 hours PRN Nausea      Allergies    Haldol (Dystonic RXN)    Intolerances          LABS:                          11.4   13.4  )-----------( 243      ( 2019 05:33 )             36.2     06-19    135  |  89<L>  |  16.0  ----------------------------<  343<H>  4.2   |  33.0<H>  |  0.51    Ca    8.8      2019 05:24  Phos  4.3     06-18  Mg     1.7             Urinalysis Basic - ( 2019 21:56 )    Color: Yellow / Appearance: Clear / S.010 / pH: x  Gluc: x / Ketone: Negative  / Bili: Negative / Urobili: Negative mg/dL   Blood: x / Protein: 15 mg/dL / Nitrite: Negative   Leuk Esterase: Trace / RBC: 0-2 /HPF / WBC 0-2   Sq Epi: x / Non Sq Epi: Few / Bacteria: x        RADIOLOGY & ADDITIONAL TESTS:

## 2019-06-20 ENCOUNTER — TRANSCRIPTION ENCOUNTER (OUTPATIENT)
Age: 54
End: 2019-06-20

## 2019-06-20 LAB
ANION GAP SERPL CALC-SCNC: 11 MMOL/L — SIGNIFICANT CHANGE UP (ref 5–17)
BUN SERPL-MCNC: 21 MG/DL — HIGH (ref 8–20)
CALCIUM SERPL-MCNC: 8.9 MG/DL — SIGNIFICANT CHANGE UP (ref 8.6–10.2)
CHLORIDE SERPL-SCNC: 95 MMOL/L — LOW (ref 98–107)
CO2 SERPL-SCNC: 31 MMOL/L — HIGH (ref 22–29)
CREAT SERPL-MCNC: 0.55 MG/DL — SIGNIFICANT CHANGE UP (ref 0.5–1.3)
GLUCOSE BLDC GLUCOMTR-MCNC: 184 MG/DL — HIGH (ref 70–99)
GLUCOSE BLDC GLUCOMTR-MCNC: 239 MG/DL — HIGH (ref 70–99)
GLUCOSE BLDC GLUCOMTR-MCNC: 275 MG/DL — HIGH (ref 70–99)
GLUCOSE BLDC GLUCOMTR-MCNC: 295 MG/DL — HIGH (ref 70–99)
GLUCOSE SERPL-MCNC: 295 MG/DL — HIGH (ref 70–115)
MAGNESIUM SERPL-MCNC: 1.8 MG/DL — SIGNIFICANT CHANGE UP (ref 1.6–2.6)
POTASSIUM SERPL-MCNC: 4.4 MMOL/L — SIGNIFICANT CHANGE UP (ref 3.5–5.3)
POTASSIUM SERPL-SCNC: 4.4 MMOL/L — SIGNIFICANT CHANGE UP (ref 3.5–5.3)
SODIUM SERPL-SCNC: 137 MMOL/L — SIGNIFICANT CHANGE UP (ref 135–145)

## 2019-06-20 PROCEDURE — 99232 SBSQ HOSP IP/OBS MODERATE 35: CPT

## 2019-06-20 PROCEDURE — 71250 CT THORAX DX C-: CPT | Mod: 26

## 2019-06-20 RX ORDER — IPRATROPIUM/ALBUTEROL SULFATE 18-103MCG
3 AEROSOL WITH ADAPTER (GRAM) INHALATION EVERY 6 HOURS
Refills: 0 | Status: DISCONTINUED | OUTPATIENT
Start: 2019-06-20 | End: 2019-06-25

## 2019-06-20 RX ADMIN — INSULIN GLARGINE 15 UNIT(S): 100 INJECTION, SOLUTION SUBCUTANEOUS at 08:20

## 2019-06-20 RX ADMIN — Medication 2: at 17:04

## 2019-06-20 RX ADMIN — INSULIN GLARGINE 45 UNIT(S): 100 INJECTION, SOLUTION SUBCUTANEOUS at 21:53

## 2019-06-20 RX ADMIN — ONDANSETRON 4 MILLIGRAM(S): 8 TABLET, FILM COATED ORAL at 17:03

## 2019-06-20 RX ADMIN — Medication 1 MILLIGRAM(S): at 05:06

## 2019-06-20 RX ADMIN — Medication 1 MILLIGRAM(S): at 21:52

## 2019-06-20 RX ADMIN — CEFTRIAXONE 100 MILLIGRAM(S): 500 INJECTION, POWDER, FOR SOLUTION INTRAMUSCULAR; INTRAVENOUS at 12:36

## 2019-06-20 RX ADMIN — Medication 2 MILLIGRAM(S): at 12:37

## 2019-06-20 RX ADMIN — SODIUM CHLORIDE 3 MILLILITER(S): 9 INJECTION INTRAMUSCULAR; INTRAVENOUS; SUBCUTANEOUS at 05:06

## 2019-06-20 RX ADMIN — Medication 40 MILLIGRAM(S): at 17:04

## 2019-06-20 RX ADMIN — Medication 250 MILLIGRAM(S): at 17:04

## 2019-06-20 RX ADMIN — Medication 1 MILLIGRAM(S): at 12:39

## 2019-06-20 RX ADMIN — Medication 6: at 08:26

## 2019-06-20 RX ADMIN — ENOXAPARIN SODIUM 40 MILLIGRAM(S): 100 INJECTION SUBCUTANEOUS at 17:03

## 2019-06-20 RX ADMIN — Medication 14 UNIT(S): at 08:20

## 2019-06-20 RX ADMIN — Medication 3 MILLILITER(S): at 08:26

## 2019-06-20 RX ADMIN — Medication 81 MILLIGRAM(S): at 12:43

## 2019-06-20 RX ADMIN — Medication 6: at 21:52

## 2019-06-20 RX ADMIN — SODIUM CHLORIDE 3 MILLILITER(S): 9 INJECTION INTRAMUSCULAR; INTRAVENOUS; SUBCUTANEOUS at 14:00

## 2019-06-20 RX ADMIN — SODIUM CHLORIDE 3 MILLILITER(S): 9 INJECTION INTRAMUSCULAR; INTRAVENOUS; SUBCUTANEOUS at 21:54

## 2019-06-20 RX ADMIN — ZIPRASIDONE HYDROCHLORIDE 20 MILLIGRAM(S): 20 CAPSULE ORAL at 05:06

## 2019-06-20 RX ADMIN — SODIUM CHLORIDE 100 MILLILITER(S): 9 INJECTION INTRAMUSCULAR; INTRAVENOUS; SUBCUTANEOUS at 11:47

## 2019-06-20 RX ADMIN — Medication 100 MILLIGRAM(S): at 21:52

## 2019-06-20 RX ADMIN — ONDANSETRON 4 MILLIGRAM(S): 8 TABLET, FILM COATED ORAL at 08:34

## 2019-06-20 RX ADMIN — QUETIAPINE FUMARATE 300 MILLIGRAM(S): 200 TABLET, FILM COATED ORAL at 21:52

## 2019-06-20 RX ADMIN — MONTELUKAST 10 MILLIGRAM(S): 4 TABLET, CHEWABLE ORAL at 12:43

## 2019-06-20 RX ADMIN — Medication 100 MILLIGRAM(S): at 12:38

## 2019-06-20 RX ADMIN — ZIPRASIDONE HYDROCHLORIDE 20 MILLIGRAM(S): 20 CAPSULE ORAL at 18:17

## 2019-06-20 RX ADMIN — Medication 250 MILLIGRAM(S): at 05:06

## 2019-06-20 RX ADMIN — CHLORHEXIDINE GLUCONATE 1 APPLICATION(S): 213 SOLUTION TOPICAL at 12:40

## 2019-06-20 RX ADMIN — AZITHROMYCIN 255 MILLIGRAM(S): 500 TABLET, FILM COATED ORAL at 14:04

## 2019-06-20 RX ADMIN — Medication 14 UNIT(S): at 12:37

## 2019-06-20 RX ADMIN — Medication 14 UNIT(S): at 17:04

## 2019-06-20 RX ADMIN — SODIUM CHLORIDE 100 MILLILITER(S): 9 INJECTION INTRAMUSCULAR; INTRAVENOUS; SUBCUTANEOUS at 05:06

## 2019-06-20 RX ADMIN — Medication 40 MILLIGRAM(S): at 05:06

## 2019-06-20 RX ADMIN — Medication 600 MILLIGRAM(S): at 12:38

## 2019-06-20 RX ADMIN — SIMVASTATIN 20 MILLIGRAM(S): 20 TABLET, FILM COATED ORAL at 21:52

## 2019-06-20 RX ADMIN — Medication 3 MILLILITER(S): at 12:01

## 2019-06-20 RX ADMIN — Medication 100 MILLIGRAM(S): at 05:06

## 2019-06-20 RX ADMIN — Medication 4: at 12:37

## 2019-06-20 NOTE — PROGRESS NOTE ADULT - ASSESSMENT
53 yr old male with hypertension, diabetes mellitus, schizophrenia, COPD presented with complaints of shortness of breath, productive yellow sputum and increasing oxygen requirements. Patient is a resident of a group home with recent sick contacts. IV steroids and antibiotics were initiated. He was noted to have uncontrolled diabetes, Lantus dose was adjusted. Pre meal insulin was added, endocrine evaluation was requested. Ceftriaxone was added. Verified with patient, he denies being on oxygen at retirement. He is currently requiring supplemental oxygen. Steroids were tapered. Fingersticks normalized.

## 2019-06-20 NOTE — PROGRESS NOTE ADULT - SUBJECTIVE AND OBJECTIVE BOX
INTERVAL HPI/OVERNIGHT EVENTS:  53yMale    Vital Signs Last 24 Hrs  T(C): 36.8 (2019 09:06), Max: 37.2 (2019 05:02)  T(F): 98.3 (2019 09:06), Max: 98.9 (2019 05:02)  HR: 80 (2019 12:01) (68 - 111)  BP: 153/80 (2019 09:06) (104/69 - 153/80)  BP(mean): --  RR: 20 (2019 09:06) (18 - 20)  SpO2: 100% (2019 12:01) (91% - 100%)    PHYSICAL EXAM:    GENERAL: NAD, well-groomed, well-developed  HEAD:  Atraumatic, Normocephalic  EYES: EOMI, PERRLA, conjunctiva and sclera clear  ENMT: Moist mucous membranes  NECK: Supple, No JVD  NERVOUS SYSTEM:  Alert & Oriented X3, Motor Strength 5/5 B/L upper and lower extremities; DTRs 2+ intact and symmetric  CHEST/LUNG: Clear to auscultation bilaterally; No rales, rhonchi, wheezing, or rubs  HEART: Regular rate and rhythm; No murmurs, rubs, or gallops  ABDOMEN: Soft, Nontender, Nondistended; Bowel sounds present  EXTREMITIES:  2+ Peripheral Pulses, No clubbing, cyanosis, or edema    MEDICATIONS  (STANDING):  ALBUTerol/ipratropium for Nebulization 3 milliLiter(s) Nebulizer every 4 hours  aspirin enteric coated 81 milliGRAM(s) Oral daily  azithromycin  IVPB 500 milliGRAM(s) IV Intermittent every 24 hours  benztropine 1 milliGRAM(s) Oral three times a day  cefTRIAXone   IVPB 1000 milliGRAM(s) IV Intermittent every 24 hours  cefTRIAXone   IVPB      chlorhexidine 2% Cloths 1 Application(s) Topical daily  dextrose 5%. 1000 milliLiter(s) (50 mL/Hr) IV Continuous <Continuous>  dextrose 50% Injectable 12.5 Gram(s) IV Push once  dextrose 50% Injectable 25 Gram(s) IV Push once  dextrose 50% Injectable 25 Gram(s) IV Push once  docusate sodium 100 milliGRAM(s) Oral three times a day  enoxaparin Injectable 40 milliGRAM(s) SubCutaneous every 24 hours  insulin glargine Injectable (LANTUS) 15 Unit(s) SubCutaneous every morning  insulin glargine Injectable (LANTUS) 45 Unit(s) SubCutaneous at bedtime  insulin lispro (HumaLOG) corrective regimen sliding scale   SubCutaneous Before meals and at bedtime  insulin lispro Injectable (HumaLOG) 14 Unit(s) SubCutaneous three times a day with meals  methylPREDNISolone sodium succinate Injectable 40 milliGRAM(s) IV Push every 12 hours  montelukast 10 milliGRAM(s) Oral daily  nicotine - 21 mG/24Hr(s) Patch 1 patch Transdermal daily  QUEtiapine 300 milliGRAM(s) Oral at bedtime  saccharomyces boulardii 250 milliGRAM(s) Oral two times a day  simvastatin 20 milliGRAM(s) Oral at bedtime  sodium chloride 0.9% lock flush 3 milliLiter(s) IV Push every 8 hours  theophylline ER Capsule 600 milliGRAM(s) Oral daily  ziprasidone 20 milliGRAM(s) Oral two times a day    MEDICATIONS  (PRN):  ALBUTerol    0.083% 2.5 milliGRAM(s) Nebulizer every 2 hours PRN Shortness of Breath and/or Wheezing  aluminum hydroxide/magnesium hydroxide/simethicone Suspension 30 milliLiter(s) Oral every 4 hours PRN Dyspepsia  clonazePAM  Tablet 2 milliGRAM(s) Oral three times a day PRN anxiety  dextrose 40% Gel 15 Gram(s) Oral once PRN Blood Glucose LESS THAN 70 milliGRAM(s)/deciliter  glucagon  Injectable 1 milliGRAM(s) IntraMuscular once PRN Glucose LESS THAN 70 milligrams/deciliter  guaiFENesin    Syrup 200 milliGRAM(s) Oral every 6 hours PRN Cough  ondansetron Injectable 4 milliGRAM(s) IV Push every 6 hours PRN Nausea      Allergies    Haldol (Dystonic RXN)    Intolerances          LABS:                          11.4   13.4  )-----------( 243      ( 2019 05:33 )             36.2     06-20    137  |  95<L>  |  21.0<H>  ----------------------------<  295<H>  4.4   |  31.0<H>  |  0.55    Ca    8.9      2019 08:37  Mg     1.8     06-20        Urinalysis Basic - ( 2019 21:56 )    Color: Yellow / Appearance: Clear / S.010 / pH: x  Gluc: x / Ketone: Negative  / Bili: Negative / Urobili: Negative mg/dL   Blood: x / Protein: 15 mg/dL / Nitrite: Negative   Leuk Esterase: Trace / RBC: 0-2 /HPF / WBC 0-2   Sq Epi: x / Non Sq Epi: Few / Bacteria: x        RADIOLOGY & ADDITIONAL TESTS:

## 2019-06-20 NOTE — DISCHARGE NOTE PROVIDER - PROVIDER TOKENS
PROVIDER:[TOKEN:[[id number]]],PROVIDER:[TOKEN:[[id number]]],PROVIDER:[TOKEN:[[id number]]]

## 2019-06-20 NOTE — PROGRESS NOTE ADULT - ASSESSMENT
T2DM - will increase Lantus to 22 units in AM   keep 45 at night   keep premeal Humalog     persitent NAusea - needs follwo up - unclear cause - will check LFT for tomorrow labs  amylase and lipase   abdominal exam is normal

## 2019-06-20 NOTE — DISCHARGE NOTE PROVIDER - NSDCCPCAREPLAN_GEN_ALL_CORE_FT
PRINCIPAL DISCHARGE DIAGNOSIS  Diagnosis: COPD exacerbation  Assessment and Plan of Treatment: Improved significantly from admission however still requiring some supplemental nasal canula oxygen. To be discharged to [facility] and wean oxygen as tolerated.      SECONDARY DISCHARGE DIAGNOSES  Diagnosis: Diabetes  Assessment and Plan of Treatment: Your insulin dosing was adjusted during your hospital stay. Please continue your insulin as prescribed. Follow up wtih PMD at [facility] for further evaluation/managemnet.    Diagnosis: HTN (hypertension)  Assessment and Plan of Treatment: Resume home meds    Diagnosis: Schizophrenia  Assessment and Plan of Treatment: Resume home meds PRINCIPAL DISCHARGE DIAGNOSIS  Diagnosis: COPD exacerbation  Assessment and Plan of Treatment: Improved significantly from admission however still requiring some supplemental nasal canula oxygen. To be discharged to [facility] and wean oxygen as tolerated.      SECONDARY DISCHARGE DIAGNOSES  Diagnosis: Mycobacterium avium infection  Assessment and Plan of Treatment:     Diagnosis: Diabetes  Assessment and Plan of Treatment: Your insulin dosing was adjusted during your hospital stay. Please continue your insulin as prescribed. Follow up wtih PMD at [facility] for further evaluation/managemnet.    Diagnosis: HTN (hypertension)  Assessment and Plan of Treatment: Resume home meds    Diagnosis: Schizophrenia  Assessment and Plan of Treatment: Resume home meds

## 2019-06-20 NOTE — DISCHARGE NOTE PROVIDER - NSDCFUADDINST_GEN_ALL_CORE_FT
It is important to see your primary physician as well as the physicians noted below within the next week to perform a comprehensive medical review.  Call their offices for an appointment as soon as you leave the hospital.  If you do not have a primary physician, contact the [facility] [place] [phone] located on [address] [place] [address].  Your medical issues appear to be stable at this time, but if your symptoms recur or worsen, contact your physicians and/or return to the hospital if necessary.  If you encounter any issues or questions with your medication, call your physicians before stopping the medication.  Do not drive.  Limit your diet to 2 grams of sodium daily.

## 2019-06-20 NOTE — DISCHARGE NOTE PROVIDER - CARE PROVIDERS DIRECT ADDRESSES
[email or web address],DirectAddress_Unknown

## 2019-06-20 NOTE — PROGRESS NOTE ADULT - SUBJECTIVE AND OBJECTIVE BOX
INTERVAL HPI/OVERNIGHT EVENTS:  follow up T2DM    pt with c/o nausea brett   no vomiting No abd pain   MEDICATIONS  (STANDING):  aspirin enteric coated 81 milliGRAM(s) Oral daily  azithromycin  IVPB 500 milliGRAM(s) IV Intermittent every 24 hours  benztropine 1 milliGRAM(s) Oral three times a day  cefTRIAXone   IVPB 1000 milliGRAM(s) IV Intermittent every 24 hours  cefTRIAXone   IVPB      chlorhexidine 2% Cloths 1 Application(s) Topical daily  dextrose 5%. 1000 milliLiter(s) (50 mL/Hr) IV Continuous <Continuous>  dextrose 50% Injectable 12.5 Gram(s) IV Push once  dextrose 50% Injectable 25 Gram(s) IV Push once  dextrose 50% Injectable 25 Gram(s) IV Push once  docusate sodium 100 milliGRAM(s) Oral three times a day  enoxaparin Injectable 40 milliGRAM(s) SubCutaneous every 24 hours  insulin glargine Injectable (LANTUS) 15 Unit(s) SubCutaneous every morning  insulin glargine Injectable (LANTUS) 45 Unit(s) SubCutaneous at bedtime  insulin lispro (HumaLOG) corrective regimen sliding scale   SubCutaneous Before meals and at bedtime  insulin lispro Injectable (HumaLOG) 14 Unit(s) SubCutaneous three times a day with meals  methylPREDNISolone sodium succinate Injectable 40 milliGRAM(s) IV Push every 12 hours  montelukast 10 milliGRAM(s) Oral daily  nicotine - 21 mG/24Hr(s) Patch 1 patch Transdermal daily  QUEtiapine 300 milliGRAM(s) Oral at bedtime  saccharomyces boulardii 250 milliGRAM(s) Oral two times a day  simvastatin 20 milliGRAM(s) Oral at bedtime  sodium chloride 0.9% lock flush 3 milliLiter(s) IV Push every 8 hours  theophylline ER Capsule 600 milliGRAM(s) Oral daily  ziprasidone 20 milliGRAM(s) Oral two times a day    MEDICATIONS  (PRN):  ALBUTerol    0.083% 2.5 milliGRAM(s) Nebulizer every 2 hours PRN Shortness of Breath and/or Wheezing  ALBUTerol/ipratropium for Nebulization 3 milliLiter(s) Nebulizer every 6 hours PRN Shortness of Breath and/or Wheezing  aluminum hydroxide/magnesium hydroxide/simethicone Suspension 30 milliLiter(s) Oral every 4 hours PRN Dyspepsia  clonazePAM  Tablet 2 milliGRAM(s) Oral three times a day PRN anxiety  dextrose 40% Gel 15 Gram(s) Oral once PRN Blood Glucose LESS THAN 70 milliGRAM(s)/deciliter  glucagon  Injectable 1 milliGRAM(s) IntraMuscular once PRN Glucose LESS THAN 70 milligrams/deciliter  guaiFENesin    Syrup 200 milliGRAM(s) Oral every 6 hours PRN Cough  ondansetron Injectable 4 milliGRAM(s) IV Push every 6 hours PRN Nausea      Allergies    Haldol (Dystonic RXN)    Intolerances        Review of systems:    Vital Signs Last 24 Hrs  T(C): 36.7 (20 Jun 2019 17:22), Max: 37.2 (20 Jun 2019 05:02)  T(F): 98 (20 Jun 2019 17:22), Max: 98.9 (20 Jun 2019 05:02)  HR: 95 (20 Jun 2019 17:22) (68 - 111)  BP: 118/73 (20 Jun 2019 17:22) (104/69 - 153/80)  BP(mean): --  RR: 20 (20 Jun 2019 17:22) (18 - 20)  SpO2: 98% (20 Jun 2019 17:22) (91% - 100%)    PHYSICAL EXAM:      Constitutional: NAD, well-groomed, well-developed  Neck: No LAD, No JVD, trachea midline, no thyroid enlargement  Respiratory: CTAB  Cardiovascular: S1 and S2, RRR, no M/G/R  Gastrointestinal: BS+, soft, no organomegaly   Extremities: No peripheral edema, no pedal lesions  Vascular: 2+ peripheral pulses  Skin: No rashes, no acanthosis        LABS:                        11.4   13.4  )-----------( 243      ( 19 Jun 2019 05:33 )             36.2     06-20    137  |  95<L>  |  21.0<H>  ----------------------------<  295<H>  4.4   |  31.0<H>  |  0.55    Ca    8.9      20 Jun 2019 08:37  Mg     1.8     06-20            CAPILLARY BLOOD GLUCOSE  CAPILLARY BLOOD GLUCOSE      POCT Blood Glucose.: 275 mg/dL (20 Jun 2019 21:51)  POCT Blood Glucose.: 184 mg/dL (20 Jun 2019 16:39)  POCT Blood Glucose.: 239 mg/dL (20 Jun 2019 12:02)  POCT Blood Glucose.: 295 mg/dL (20 Jun 2019 08:13)      RADIOLOGY & ADDITIONAL TESTS:

## 2019-06-20 NOTE — DISCHARGE NOTE PROVIDER - HOSPITAL COURSE
53 yr old male with hypertension, diabetes mellitus, schizophrenia, COPD on home oxygen presented with complaints of shortness of breath, productive yellow sputum and increasing oxygen requirements. Patient is a resident of a group home with recent sick contacts. IV steroids and antibiotics were initiated. He was noted to have uncontrolled diabetes, Lantus dose was adjusted. Pre meal insulin was added, endocrine evaluation was requested. Ceftriaxone was added. Pt still requiring o2 supplementation and therefore will be discharged to [facility]. Initial HPI:    52 y/o male with history of COPD on home 02 2-3 liters, smokes 2.5 packs of cig per day, DM-2, Schizophrenia, HLD, sent in from [facility] for increasing SOB, cough with thick sputum, diffuse wheezing.  Admitted for COPD exacerbation.  Seen by pulmonary.  CT Chest showed: 1. Scattered ground-glass and tree-in-bud opacities, likely infectious in etiology. 2. Mild bronchiectasis. 3. Scattered foci of air density, either small cavitations or focally dilated bronchi. 4. Esophageal wall thickening.  He was presumptively treated for RADHA, seen by ID. Now reports significant improvement in symptoms.  Per ID and pulmonary recommendations, outpatient workup of RADHA, continue 2-4 week course of Zithromax.         Denies complaints.  No SOB.  No cough / fever/ chills.  No additional complaints.  +BM        Problem/Plan - 1:    ·  Problem: COPD exacerbation.  Plan: CT chest noted, pulmonary eval noted, Prednisone taper, continue supplemental oxygen. Duoneb, outpatient pulmonary follow up in 2 weeks.    Plan for d/c to [facility] on 6/24.         Problem/Plan - 2:    ·  Problem: Schizophrenia, unspecified type.  Plan: Continue Geodon, Klonopin, Cogentin and Seroquel.  Mood stable.         Problem/Plan - 3:    ·  Problem: Type 2 diabetes mellitus without complication, with long-term current use of insulin.  Plan: Less hypoglycemia with change in premeal insulin.  Continue Lantus.  FS are expected to improve with steroid taper.  Endocrine input is appreciated.         Problem/Plan - 4:    ·  Problem: R/O RADHA (mycobacterium avium-intracellulare).  Plan: ID eval noted.  On Azithromycin. Outpatient workup of RADHA.  Supplemental oxygen.    Plan 2-4 weeks of Zithromax -> 7/17.  Check QT with EKG.        Problem/Plan - 5:    ·  Problem: Hyponatremia.  Plan: Stable.         Disposition: Stable for discharge.  Outpatient followup discussed.    Total time spent on discharge is  55 minutes. Initial HPI:    54 y/o male with history of COPD on home 02 2-3 liters, smokes 2.5 packs of cig per day, DM-2, Schizophrenia, HLD, sent in from [facility] for increasing SOB, cough with thick sputum, diffuse wheezing.  Admitted for COPD exacerbation.  Seen by pulmonary.  CT Chest showed: 1. Scattered ground-glass and tree-in-bud opacities, likely infectious in etiology. 2. Mild bronchiectasis. 3. Scattered foci of air density, either small cavitations or focally dilated bronchi. 4. Esophageal wall thickening.  He was presumptively treated for RADHA, seen by ID. Now reports significant improvement in symptoms.  Per ID and pulmonary recommendations, outpatient workup of RADHA, continue 2-4 week course of Zithromax.         Denies complaints.  No SOB.  No cough / fever/ chills.  No additional complaints.         53 yr old male with hypertension, diabetes mellitus, schizophrenia, COPD presented with complaints of shortness of breath, productive yellow sputum and increasing oxygen requirements. Patient is a resident of a group home with recent sick contacts. IV steroids and antibiotics were initiated. He was noted to have uncontrolled diabetes, Lantus dose was adjusted. Pre meal insulin was added, endocrine evaluation was requested. Ceftriaxone was added. Verified with patient, he denies being on oxygen at [facility]. He is currently requiring supplemental oxygen. Steroids were tapered. Fingersticks normalized. Given persistent hypoxia, CT chest was ordered which showed scattered ground-glass and tree-in-bud opacities, likely infectious in etiology. Mild bronchiectasis. Scattered foci of air density, either small cavitations or focally dilated bronchi. Esophageal wall thickening, differential for the lung findings includes microbacterium avium infection. ID and pulmonary was consulted.  Responded well to antibiotics, steroid therapy and nebulizers.          Problem/Plan - 1:    ·  Problem: COPD exacerbation.  Plan: CT chest noted, pulmonary eval noted, Prednisone taper, continue supplemental oxygen. Duoneb, outpatient pulmonary follow up in 2 weeks.    Plan for d/c to [facility] on 6/24.         Problem/Plan - 2:    ·  Problem: Schizophrenia, unspecified type.  Plan: Continue Geodon, Klonopin, Cogentin and Seroquel.  Mood stable.         Problem/Plan - 3:    ·  Problem: Type 2 diabetes mellitus without complication, with long-term current use of insulin.  Plan: Less hypoglycemia with change in premeal insulin.  Continue Lantus.  FS are expected to improve with steroid taper.  Endocrine input is appreciated.         Problem/Plan - 4:    ·  Problem: R/O RADHA (mycobacterium avium-intracellulare).  Plan: ID eval noted.  On Azithromycin. Outpatient workup of RADHA.  Supplemental oxygen.    Plan 2-4 weeks of Zithromax -> 7/17.  .         Problem/Plan - 5:    ·  Problem: Hyponatremia.  Plan: Stable.         Attending Attestation:     Pt's medication regimen as outpatient confirmed with [facility] [phone].  Pt had been on Eliquis, however, they were unclear on indication.  Anticoagulation is continued with the recommendation with PMD f/u regarding duration of therapy.         Disposition: Stable for discharge.  Outpatient followup discussed.    Total time spent on discharge is  50 minutes. Initial HPI:    52 y/o male with history of COPD on home 02 2-3 liters, smokes 2.5 packs of cig per day, DM-2, Schizophrenia, HLD, sent in from [facility] for increasing SOB, cough with thick sputum, diffuse wheezing.  Admitted for COPD exacerbation.  Seen by pulmonary.  CT Chest showed: 1. Scattered ground-glass and tree-in-bud opacities, likely infectious in etiology. 2. Mild bronchiectasis. 3. Scattered foci of air density, either small cavitations or focally dilated bronchi. 4. Esophageal wall thickening.  He was presumptively treated for RADHA, seen by ID. Now reports significant improvement in symptoms.  Per ID and pulmonary recommendations, outpatient workup of RADHA, continue 2-4 week course of Zithromax.         Denies complaints.  No SOB.  No cough / fever/ chills.  No additional complaints.         53 yr old male with hypertension, diabetes mellitus, schizophrenia, COPD presented with complaints of shortness of breath, productive yellow sputum and increasing oxygen requirements. Patient is a resident of a group home with recent sick contacts. IV steroids and antibiotics were initiated. He was noted to have uncontrolled diabetes, Lantus dose was adjusted. Pre meal insulin was added, endocrine evaluation was requested. Ceftriaxone was added. Verified with patient, he denies being on oxygen at [facility]. He is currently requiring supplemental oxygen. Steroids were tapered. Fingersticks normalized. Given persistent hypoxia, CT chest was ordered which showed scattered ground-glass and tree-in-bud opacities, likely infectious in etiology. Mild bronchiectasis. Scattered foci of air density, either small cavitations or focally dilated bronchi. Esophageal wall thickening, differential for the lung findings includes microbacterium avium infection. ID and pulmonary was consulted.  Responded well to antibiotics, steroid therapy and nebulizers.          Problem/Plan - 1:    ·  Problem: COPD exacerbation.  Plan: CT chest noted, pulmonary eval noted, Prednisone taper, continue supplemental oxygen. Duoneb, outpatient pulmonary follow up in 2 weeks.    Plan for d/c to [facility] on 6/24.         Problem/Plan - 2:    ·  Problem: Schizophrenia, unspecified type.  Plan: Continue Geodon, Klonopin, Cogentin and Seroquel.  Mood stable.         Problem/Plan - 3:    ·  Problem: Type 2 diabetes mellitus without complication, with long-term current use of insulin.  Plan: Less hypoglycemia with change in premeal insulin.  Continue Lantus.  FS are expected to improve with steroid taper.  Endocrine input is appreciated.         Problem/Plan - 4:    ·  Problem: R/O RADHA (mycobacterium avium-intracellulare).  Plan: ID eval noted.  On Azithromycin. Outpatient workup of RADHA.  No need for oxygen.    Plan 2-4 weeks of Zithromax -> 7/17.  .         Problem/Plan - 5:    ·  Problem: Hyponatremia.  Plan: Stable.         Attending Attestation:     Pt's medication regimen as outpatient confirmed with [facility] [phone].  Pt had been on Eliquis, however, they were unclear on indication.  Anticoagulation is continued with the recommendation with PMD f/u regarding duration of therapy.         Disposition: Stable for discharge.  Outpatient followup discussed.    Total time spent on discharge is  50 minutes.

## 2019-06-20 NOTE — DISCHARGE NOTE PROVIDER - CARE PROVIDER_API CALL
Laith Hitchcock)  Critical Care Medicine; Internal Medicine; Pulmonary Disease; Sleep Medicine  [address], [address]  [place] [address]  Phone: [phone]  Fax: [phone]  Follow Up Time:     Jacque Milligan)  Infectious Disease; Internal Medicine  [address], [address]  [place] [address]  Phone: [phone]  Fax: [phone]  Follow Up Time:     Ester Cerda)  EndocrinologyMetabDiabetes; Internal Medicine  [address] [place] [address]  Phone: [phone]  Fax: [phone]  Follow Up Time:

## 2019-06-20 NOTE — PROGRESS NOTE ADULT - PROBLEM SELECTOR PLAN 1
Improving, taper steroids, continue Duoneb. Supplemental oxygen. Continue antibiotics. Will require oxygen upon discharge. Will repeat CXR today.

## 2019-06-21 LAB
ALBUMIN SERPL ELPH-MCNC: 3.3 G/DL — SIGNIFICANT CHANGE UP (ref 3.3–5.2)
ALP SERPL-CCNC: 74 U/L — SIGNIFICANT CHANGE UP (ref 40–120)
ALT FLD-CCNC: 6 U/L — SIGNIFICANT CHANGE UP
AMYLASE P1 CFR SERPL: 26 U/L — LOW (ref 36–128)
ANION GAP SERPL CALC-SCNC: 10 MMOL/L — SIGNIFICANT CHANGE UP (ref 5–17)
AST SERPL-CCNC: 7 U/L — SIGNIFICANT CHANGE UP
BILIRUB SERPL-MCNC: 0.3 MG/DL — LOW (ref 0.4–2)
BUN SERPL-MCNC: 15 MG/DL — SIGNIFICANT CHANGE UP (ref 8–20)
CALCIUM SERPL-MCNC: 8.8 MG/DL — SIGNIFICANT CHANGE UP (ref 8.6–10.2)
CHLORIDE SERPL-SCNC: 94 MMOL/L — LOW (ref 98–107)
CO2 SERPL-SCNC: 28 MMOL/L — SIGNIFICANT CHANGE UP (ref 22–29)
CREAT SERPL-MCNC: 0.46 MG/DL — LOW (ref 0.5–1.3)
GLUCOSE BLDC GLUCOMTR-MCNC: 182 MG/DL — HIGH (ref 70–99)
GLUCOSE BLDC GLUCOMTR-MCNC: 211 MG/DL — HIGH (ref 70–99)
GLUCOSE BLDC GLUCOMTR-MCNC: 231 MG/DL — HIGH (ref 70–99)
GLUCOSE BLDC GLUCOMTR-MCNC: 237 MG/DL — HIGH (ref 70–99)
GLUCOSE BLDC GLUCOMTR-MCNC: 342 MG/DL — HIGH (ref 70–99)
GLUCOSE SERPL-MCNC: 193 MG/DL — HIGH (ref 70–115)
HIV 1 & 2 AB SERPL IA.RAPID: SIGNIFICANT CHANGE UP
LIDOCAIN IGE QN: 8 U/L — LOW (ref 22–51)
POTASSIUM SERPL-MCNC: 4 MMOL/L — SIGNIFICANT CHANGE UP (ref 3.5–5.3)
POTASSIUM SERPL-SCNC: 4 MMOL/L — SIGNIFICANT CHANGE UP (ref 3.5–5.3)
PROCALCITONIN SERPL-MCNC: 0.03 NG/ML — SIGNIFICANT CHANGE UP (ref 0.02–0.1)
PROT SERPL-MCNC: 6.2 G/DL — LOW (ref 6.6–8.7)
SODIUM SERPL-SCNC: 132 MMOL/L — LOW (ref 135–145)

## 2019-06-21 PROCEDURE — 99233 SBSQ HOSP IP/OBS HIGH 50: CPT

## 2019-06-21 PROCEDURE — 99223 1ST HOSP IP/OBS HIGH 75: CPT

## 2019-06-21 PROCEDURE — 99232 SBSQ HOSP IP/OBS MODERATE 35: CPT

## 2019-06-21 RX ORDER — INSULIN GLARGINE 100 [IU]/ML
20 INJECTION, SOLUTION SUBCUTANEOUS EVERY MORNING
Refills: 0 | Status: DISCONTINUED | OUTPATIENT
Start: 2019-06-21 | End: 2019-06-25

## 2019-06-21 RX ORDER — LACTULOSE 10 G/15ML
10 SOLUTION ORAL ONCE
Refills: 0 | Status: COMPLETED | OUTPATIENT
Start: 2019-06-21 | End: 2019-06-21

## 2019-06-21 RX ORDER — CEFTRIAXONE 500 MG/1
1000 INJECTION, POWDER, FOR SOLUTION INTRAMUSCULAR; INTRAVENOUS EVERY 24 HOURS
Refills: 0 | Status: COMPLETED | OUTPATIENT
Start: 2019-06-21 | End: 2019-06-23

## 2019-06-21 RX ORDER — MAGNESIUM HYDROXIDE 400 MG/1
30 TABLET, CHEWABLE ORAL ONCE
Refills: 0 | Status: COMPLETED | OUTPATIENT
Start: 2019-06-21 | End: 2019-06-21

## 2019-06-21 RX ORDER — SODIUM CHLORIDE 9 MG/ML
1000 INJECTION INTRAMUSCULAR; INTRAVENOUS; SUBCUTANEOUS
Refills: 0 | Status: DISCONTINUED | OUTPATIENT
Start: 2019-06-21 | End: 2019-06-24

## 2019-06-21 RX ORDER — INSULIN LISPRO 100/ML
17 VIAL (ML) SUBCUTANEOUS
Refills: 0 | Status: DISCONTINUED | OUTPATIENT
Start: 2019-06-21 | End: 2019-06-23

## 2019-06-21 RX ADMIN — INSULIN GLARGINE 45 UNIT(S): 100 INJECTION, SOLUTION SUBCUTANEOUS at 21:03

## 2019-06-21 RX ADMIN — LACTULOSE 10 GRAM(S): 10 SOLUTION ORAL at 14:56

## 2019-06-21 RX ADMIN — Medication 30 MILLILITER(S): at 13:47

## 2019-06-21 RX ADMIN — ZIPRASIDONE HYDROCHLORIDE 20 MILLIGRAM(S): 20 CAPSULE ORAL at 05:03

## 2019-06-21 RX ADMIN — Medication 100 MILLIGRAM(S): at 21:04

## 2019-06-21 RX ADMIN — Medication 250 MILLIGRAM(S): at 05:03

## 2019-06-21 RX ADMIN — Medication 14 UNIT(S): at 12:35

## 2019-06-21 RX ADMIN — Medication 1 MILLIGRAM(S): at 05:03

## 2019-06-21 RX ADMIN — SODIUM CHLORIDE 3 MILLILITER(S): 9 INJECTION INTRAMUSCULAR; INTRAVENOUS; SUBCUTANEOUS at 13:52

## 2019-06-21 RX ADMIN — Medication 4: at 08:25

## 2019-06-21 RX ADMIN — SODIUM CHLORIDE 3 MILLILITER(S): 9 INJECTION INTRAMUSCULAR; INTRAVENOUS; SUBCUTANEOUS at 21:30

## 2019-06-21 RX ADMIN — QUETIAPINE FUMARATE 300 MILLIGRAM(S): 200 TABLET, FILM COATED ORAL at 21:04

## 2019-06-21 RX ADMIN — Medication 2: at 17:24

## 2019-06-21 RX ADMIN — Medication 1 MILLIGRAM(S): at 21:04

## 2019-06-21 RX ADMIN — Medication 1 MILLIGRAM(S): at 14:56

## 2019-06-21 RX ADMIN — Medication 14 UNIT(S): at 08:25

## 2019-06-21 RX ADMIN — SODIUM CHLORIDE 125 MILLILITER(S): 9 INJECTION INTRAMUSCULAR; INTRAVENOUS; SUBCUTANEOUS at 16:26

## 2019-06-21 RX ADMIN — Medication 4: at 21:04

## 2019-06-21 RX ADMIN — Medication 17 UNIT(S): at 17:24

## 2019-06-21 RX ADMIN — SIMVASTATIN 20 MILLIGRAM(S): 20 TABLET, FILM COATED ORAL at 21:04

## 2019-06-21 RX ADMIN — ONDANSETRON 4 MILLIGRAM(S): 8 TABLET, FILM COATED ORAL at 04:49

## 2019-06-21 RX ADMIN — Medication 81 MILLIGRAM(S): at 12:35

## 2019-06-21 RX ADMIN — Medication 100 MILLIGRAM(S): at 13:48

## 2019-06-21 RX ADMIN — Medication 250 MILLIGRAM(S): at 17:26

## 2019-06-21 RX ADMIN — AZITHROMYCIN 255 MILLIGRAM(S): 500 TABLET, FILM COATED ORAL at 12:36

## 2019-06-21 RX ADMIN — MONTELUKAST 10 MILLIGRAM(S): 4 TABLET, CHEWABLE ORAL at 12:36

## 2019-06-21 RX ADMIN — ZIPRASIDONE HYDROCHLORIDE 20 MILLIGRAM(S): 20 CAPSULE ORAL at 17:26

## 2019-06-21 RX ADMIN — INSULIN GLARGINE 15 UNIT(S): 100 INJECTION, SOLUTION SUBCUTANEOUS at 08:24

## 2019-06-21 RX ADMIN — Medication 40 MILLIGRAM(S): at 12:34

## 2019-06-21 RX ADMIN — ENOXAPARIN SODIUM 40 MILLIGRAM(S): 100 INJECTION SUBCUTANEOUS at 16:26

## 2019-06-21 RX ADMIN — Medication 40 MILLIGRAM(S): at 05:03

## 2019-06-21 RX ADMIN — MAGNESIUM HYDROXIDE 30 MILLILITER(S): 400 TABLET, CHEWABLE ORAL at 22:17

## 2019-06-21 RX ADMIN — CHLORHEXIDINE GLUCONATE 1 APPLICATION(S): 213 SOLUTION TOPICAL at 12:36

## 2019-06-21 RX ADMIN — Medication 600 MILLIGRAM(S): at 12:35

## 2019-06-21 RX ADMIN — Medication 100 MILLIGRAM(S): at 05:03

## 2019-06-21 RX ADMIN — SODIUM CHLORIDE 3 MILLILITER(S): 9 INJECTION INTRAMUSCULAR; INTRAVENOUS; SUBCUTANEOUS at 10:39

## 2019-06-21 RX ADMIN — Medication 4: at 12:34

## 2019-06-21 RX ADMIN — CEFTRIAXONE 100 MILLIGRAM(S): 500 INJECTION, POWDER, FOR SOLUTION INTRAMUSCULAR; INTRAVENOUS at 12:33

## 2019-06-21 NOTE — SWALLOW BEDSIDE ASSESSMENT ADULT - ASR SWALLOW ASPIRATION MONITOR
oral hygiene/fever/upper respiratory infection/gurgly voice/pneumonia/change of breathing pattern/position upright (90Y)/throat clearing/cough

## 2019-06-21 NOTE — PROGRESS NOTE ADULT - SUBJECTIVE AND OBJECTIVE BOX
INTERVAL HPI/OVERNIGHT EVENTS:    CC: COPD exacerbation, acute on chronic hypoxic respiratory failure, community acquired pneumonia r/o RADHA, schizophrenia    No overnight events, denies complaints.    Vital Signs Last 24 Hrs  T(C): 37.3 (21 Jun 2019 08:06), Max: 37.3 (21 Jun 2019 08:06)  T(F): 99.1 (21 Jun 2019 08:06), Max: 99.1 (21 Jun 2019 08:06)  HR: 76 (21 Jun 2019 08:06) (76 - 96)  BP: 117/66 (21 Jun 2019 08:06) (100/68 - 118/73)  BP(mean): --  RR: 20 (21 Jun 2019 08:06) (20 - 20)  SpO2: 95% (21 Jun 2019 08:06) (95% - 100%)    PHYSICAL EXAM:    GENERAL: Not in distress, alert  CHEST/LUNG: b/l air entry, minimal wheezing  HEART: Regular   ABDOMEN: Soft, BS+  EXTREMITIES:  No edema, tenderness.    MEDICATIONS  (STANDING):  aspirin enteric coated 81 milliGRAM(s) Oral daily  azithromycin  IVPB 500 milliGRAM(s) IV Intermittent every 24 hours  benztropine 1 milliGRAM(s) Oral three times a day  cefTRIAXone   IVPB 1000 milliGRAM(s) IV Intermittent every 24 hours  cefTRIAXone   IVPB      chlorhexidine 2% Cloths 1 Application(s) Topical daily  dextrose 5%. 1000 milliLiter(s) (50 mL/Hr) IV Continuous <Continuous>  dextrose 50% Injectable 12.5 Gram(s) IV Push once  dextrose 50% Injectable 25 Gram(s) IV Push once  dextrose 50% Injectable 25 Gram(s) IV Push once  docusate sodium 100 milliGRAM(s) Oral three times a day  enoxaparin Injectable 40 milliGRAM(s) SubCutaneous every 24 hours  insulin glargine Injectable (LANTUS) 15 Unit(s) SubCutaneous every morning  insulin glargine Injectable (LANTUS) 45 Unit(s) SubCutaneous at bedtime  insulin lispro (HumaLOG) corrective regimen sliding scale   SubCutaneous Before meals and at bedtime  insulin lispro Injectable (HumaLOG) 14 Unit(s) SubCutaneous three times a day with meals  methylPREDNISolone sodium succinate Injectable 40 milliGRAM(s) IV Push every 12 hours  montelukast 10 milliGRAM(s) Oral daily  nicotine - 21 mG/24Hr(s) Patch 1 patch Transdermal daily  QUEtiapine 300 milliGRAM(s) Oral at bedtime  saccharomyces boulardii 250 milliGRAM(s) Oral two times a day  simvastatin 20 milliGRAM(s) Oral at bedtime  sodium chloride 0.9% lock flush 3 milliLiter(s) IV Push every 8 hours  theophylline ER Capsule 600 milliGRAM(s) Oral daily  ziprasidone 20 milliGRAM(s) Oral two times a day    MEDICATIONS  (PRN):  ALBUTerol    0.083% 2.5 milliGRAM(s) Nebulizer every 2 hours PRN Shortness of Breath and/or Wheezing  ALBUTerol/ipratropium for Nebulization 3 milliLiter(s) Nebulizer every 6 hours PRN Shortness of Breath and/or Wheezing  aluminum hydroxide/magnesium hydroxide/simethicone Suspension 30 milliLiter(s) Oral every 4 hours PRN Dyspepsia  clonazePAM  Tablet 2 milliGRAM(s) Oral three times a day PRN anxiety  dextrose 40% Gel 15 Gram(s) Oral once PRN Blood Glucose LESS THAN 70 milliGRAM(s)/deciliter  glucagon  Injectable 1 milliGRAM(s) IntraMuscular once PRN Glucose LESS THAN 70 milligrams/deciliter  guaiFENesin    Syrup 200 milliGRAM(s) Oral every 6 hours PRN Cough  ondansetron Injectable 4 milliGRAM(s) IV Push every 6 hours PRN Nausea      Allergies    Haldol (Dystonic RXN)    Intolerances          LABS:      06-21    132<L>  |  94<L>  |  15.0  ----------------------------<  193<H>  4.0   |  28.0  |  0.46<L>    Ca    8.8      21 Jun 2019 08:17  Mg     1.8     06-20    TPro  6.2<L>  /  Alb  3.3  /  TBili  0.3<L>  /  DBili  x   /  AST  7   /  ALT  6   /  AlkPhos  74  06-21          RADIOLOGY & ADDITIONAL TESTS:

## 2019-06-21 NOTE — PROGRESS NOTE ADULT - ASSESSMENT
53 yr old male with hypertension, diabetes mellitus, schizophrenia, COPD presented with complaints of shortness of breath, productive yellow sputum and increasing oxygen requirements. Patient is a resident of a group home with recent sick contacts. IV steroids and antibiotics were initiated. He was noted to have uncontrolled diabetes, Lantus dose was adjusted. Pre meal insulin was added, endocrine evaluation was requested. Ceftriaxone was added. Verified with patient, he denies being on oxygen at custodial. He is currently requiring supplemental oxygen. Steroids were tapered. Fingersticks normalized. Given persistent hypoxia, CT chest was ordered which showed scattered ground-glass and tree-in-bud opacities, likely infectious in etiology. Mild bronchiectasis. Scattered foci of air density, either small cavitations or focally dilated bronchi. Esophageal wall thickening, differential for the lung findings includes microbacterium avium infection. ID and pulmonary was consulted.

## 2019-06-21 NOTE — CONSULT NOTE ADULT - ASSESSMENT
Assess    Mild to moderate COPD - with exacerbation  Inflammatory groundglass and tree-in-bud opacities - New vs March, 2019 at [facility]  Hypoxia  RADHA is in Ddx - bronch, multidrug therapy premature clinically; Not localized so not surgical either    Rec    Neb  Steroid with taper  02 as needed  Consider azithromycin alone for a slightly protracted course of 2-4 weeks with OP FU by Dr Gomez for imaging and reassessment of 02 requirement if ID concurs  Little to add  No objection to discharge Assess    Mild to moderate COPD - with exacerbation  Inflammatory groundglass and tree-in-bud opacities - New vs March, 2019 at [facility]  Hypoxia  GERD  RADHA is in Ddx - bronch, multidrug therapy premature clinically; Not localized so not surgical either    Rec    Neb  Steroid with taper  02 as needed  Consider azithromycin alone for a slightly protracted course of 2-4 weeks with OP FU by Dr Gomez for imaging and reassessment of 02 requirement if ID concurs  GERD measures; Truncal elevation for sleep and no food within 90 minutes of hs might also be of benefit  Little to add  No objection to discharge

## 2019-06-21 NOTE — PROGRESS NOTE ADULT - SUBJECTIVE AND OBJECTIVE BOX
INTERVAL HPI/OVERNIGHT EVENTS  :follwo up t2Dm  pt seen lying in bed   c/o constiaption for severaldays    No n/v    eating ok   cough is better      MEDICATIONS  (STANDING):  aspirin enteric coated 81 milliGRAM(s) Oral daily  azithromycin  IVPB 500 milliGRAM(s) IV Intermittent every 24 hours  benztropine 1 milliGRAM(s) Oral three times a day  cefTRIAXone   IVPB 1000 milliGRAM(s) IV Intermittent every 24 hours  chlorhexidine 2% Cloths 1 Application(s) Topical daily  dextrose 5%. 1000 milliLiter(s) (50 mL/Hr) IV Continuous <Continuous>  dextrose 50% Injectable 12.5 Gram(s) IV Push once  dextrose 50% Injectable 25 Gram(s) IV Push once  dextrose 50% Injectable 25 Gram(s) IV Push once  docusate sodium 100 milliGRAM(s) Oral three times a day  enoxaparin Injectable 40 milliGRAM(s) SubCutaneous every 24 hours  insulin glargine Injectable (LANTUS) 45 Unit(s) SubCutaneous at bedtime  insulin glargine Injectable (LANTUS) 20 Unit(s) SubCutaneous every morning  insulin lispro (HumaLOG) corrective regimen sliding scale   SubCutaneous Before meals and at bedtime  insulin lispro Injectable (HumaLOG) 17 Unit(s) SubCutaneous three times a day with meals  montelukast 10 milliGRAM(s) Oral daily  nicotine - 21 mG/24Hr(s) Patch 1 patch Transdermal daily  predniSONE   Tablet 40 milliGRAM(s) Oral daily  QUEtiapine 300 milliGRAM(s) Oral at bedtime  saccharomyces boulardii 250 milliGRAM(s) Oral two times a day  simvastatin 20 milliGRAM(s) Oral at bedtime  sodium chloride 0.9% lock flush 3 milliLiter(s) IV Push every 8 hours  sodium chloride 0.9%. 1000 milliLiter(s) (125 mL/Hr) IV Continuous <Continuous>  theophylline ER Capsule 600 milliGRAM(s) Oral daily  ziprasidone 20 milliGRAM(s) Oral two times a day    MEDICATIONS  (PRN):  ALBUTerol    0.083% 2.5 milliGRAM(s) Nebulizer every 2 hours PRN Shortness of Breath and/or Wheezing  ALBUTerol/ipratropium for Nebulization 3 milliLiter(s) Nebulizer every 6 hours PRN Shortness of Breath and/or Wheezing  aluminum hydroxide/magnesium hydroxide/simethicone Suspension 30 milliLiter(s) Oral every 4 hours PRN Dyspepsia  clonazePAM  Tablet 2 milliGRAM(s) Oral three times a day PRN anxiety  dextrose 40% Gel 15 Gram(s) Oral once PRN Blood Glucose LESS THAN 70 milliGRAM(s)/deciliter  glucagon  Injectable 1 milliGRAM(s) IntraMuscular once PRN Glucose LESS THAN 70 milligrams/deciliter  guaiFENesin    Syrup 200 milliGRAM(s) Oral every 6 hours PRN Cough  magnesium hydroxide Suspension 30 milliLiter(s) Oral once PRN Constipation  ondansetron Injectable 4 milliGRAM(s) IV Push every 6 hours PRN Nausea      Allergies    Haldol (Dystonic RXN)    Intolerances        Review of systems:    Vital Signs Last 24 Hrs  T(C): 36.7 (21 Jun 2019 15:22), Max: 37.3 (21 Jun 2019 08:06)  T(F): 98.1 (21 Jun 2019 15:22), Max: 99.1 (21 Jun 2019 08:06)  HR: 77 (21 Jun 2019 15:22) (76 - 96)  BP: 112/80 (21 Jun 2019 15:22) (100/68 - 117/66)  BP(mean): --  RR: 18 (21 Jun 2019 20:00) (18 - 20)  SpO2: 99% (21 Jun 2019 20:00) (92% - 99%)    PHYSICAL EXAM:      Constitutional: NAD, well-groomed, well-developed  HEENT: PERRLA, EOMI, no exophalmos  Neck: No LAD, No JVD, trachea midline, no thyroid enlargement  Respiratory: CTAB  Cardiovascular: S1 and S2, RRR, no M/G/R  Gastrointestinal: BS+, soft, no organomeglag or mass  Extremities: No peripheral edema, no pedal lesions          LABS:    06-21    132<L>  |  94<L>  |  15.0  ----------------------------<  193<H>  4.0   |  28.0  |  0.46<L>    Ca    8.8      21 Jun 2019 08:17  Mg     1.8     06-20    TPro  6.2<L>  /  Alb  3.3  /  TBili  0.3<L>  /  DBili  x   /  AST  7   /  ALT  6   /  AlkPhos  74  06-21          CAPILLARY BLOOD GLUCOSE  CAPILLARY BLOOD GLUCOSE      POCT Blood Glucose.: 211 mg/dL (21 Jun 2019 21:02)  POCT Blood Glucose.: 182 mg/dL (21 Jun 2019 16:56)  POCT Blood Glucose.: 342 mg/dL (21 Jun 2019 14:06)  POCT Blood Glucose.: 237 mg/dL (21 Jun 2019 12:06)  POCT Blood Glucose.: 231 mg/dL (21 Jun 2019 08:05)  POCT Blood Glucose.: 275 mg/dL (20 Jun 2019 21:51)      RADIOLOGY & ADDITIONAL TESTS:

## 2019-06-21 NOTE — CONSULT NOTE ADULT - ASSESSMENT
54y/o  Male with h/o hypertension, diabetes mellitus, schizophrenia, COPD. Patient  presented with complaints of shortness of breath, productive yellow sputum and increasing oxygen requirements. Patient is a resident of a group home. In the ER patient was afebrile, leukocytosis to 16.9k. He was treated with IV steroids and Azithromycin and Ceftriaxone. Clinically he improved however continued to require O2 due to hypoxia so a CT chest was done which reported scattered ground-glass and tree-in-bud opacities, likely infectious in etiology.      Pneumonia  Hypoxia  Former smoker      - Blood cultures negative    - RVP negative  - Urine for legionella ordered  - CT Chest reviewed, d/w Dr Hitchcock, repeat imaging and work up to r/o RADHA can be done as outpatient  - Procalcitonin level ordered  - Patient agreeable to HIV testing so will order  - Do swallow eval  - Continue Azithromycin  - Continue Ceftriaxone   - Trend Fever  - Trend Leukocytosis      Will Follow

## 2019-06-21 NOTE — SWALLOW BEDSIDE ASSESSMENT ADULT - ADDITIONAL RECOMMENDATIONS
No further follow up warranted at bedside, as pt tolerated given PO trials with no overt s/s of penetration & aspiration. If overt difficulties are present, please consider MBS to objectify swallow function. No further follow up warranted at bedside, as pt tolerated given PO trials with no overt s/s of penetration & aspiration. If there are concerns for silent aspiration, consider MBS to objectively assess swallow.

## 2019-06-21 NOTE — PROGRESS NOTE ADULT - ASSESSMENT
T2DM- will increase AM LAntus to 20 units  keep 45 units at bed   Agree with increase of Humalog to 17 untis tid ac    COPD- improving - still on NC oxygen   constiaptin- encourgaed hydration and  if ok  with PMD -allow ambulation

## 2019-06-21 NOTE — PROGRESS NOTE ADULT - PROBLEM SELECTOR PLAN 1
CT chest noted, pulmonary eval noted, Prednisone taper, continue supplemental oxygen. Duoneb, outpatient pulmonary follow up in 2 weeks.  He will need to be discharged to [facility], will not require longer than 100 days of convalescence care.

## 2019-06-21 NOTE — SWALLOW BEDSIDE ASSESSMENT ADULT - COMMENTS
As per MD note: "54 y/o male with history of COPD on home 02 2-3 liters, smokes 2.5 packs of cig per day, DM-2, Psychiatric disorder, HLD, sent in from [facility] for increasing SOB, cough with thick sputum, diffuse wheezing. Patient states multiple sick contacts in group home. Patient denies fever, chills, N/V, CP, /GI complaints. In the ED patient with full field wheezing, cough, and conversational dyspnea. Patient with low grade temp, leukocytosis. No infiltrate on CXR, lactate normal. Patient with mild improvement but still does not feel at baseline. Patient poor historian, denies any other complaints at this time"

## 2019-06-21 NOTE — SWALLOW BEDSIDE ASSESSMENT ADULT - SLP GENERAL OBSERVATIONS
Pt received & seen laying in bed, able to sit at edge of bed without assistance, +awake/alert, +agitated, +0/10 pain. Pt received & seen lying in bed, able to sit at edge of bed without assistance, +awake/alert, + mildly agitated, however, cooperative for eval, +0/10 pain.

## 2019-06-21 NOTE — SWALLOW BEDSIDE ASSESSMENT ADULT - DIET PRIOR TO ADMI
Regular/thin liquids Regular/thin liquids as per pt report. However, unable to locate transfer paperwork to confirm diet prior to admission.

## 2019-06-21 NOTE — CONSULT NOTE ADULT - SUBJECTIVE AND OBJECTIVE BOX
HPI:  52 y/o male with history of COPD on home 02 2-3 liters, smokes 2.5 packs of cig per day, DM-2, Psychiatric disorder, HLD, sent in from [facility] for increasing SOB, cough with thick sputum, diffuse wheezing. Patient states multiple sick contacts in group home. Patient denies fever, chills, N/V, CP, /GI complaints.Pt admitted with pneumonia and started on IV antibitoics and steroid   BS have increased dramatically since yesterday     PAST MEDICAL & SURGICAL HISTORY:  Depression  GI bleed  Schizophrenia  Gastritis  Bronchitis  COPD   Hypertension  DM (diabetes mellitus) since   initially started on po meds then transitioned to insuin    no recent severe low BS- happened about 12 years ago x2   however some mild hypoglycmeia in the past month  or 2  + retinopathy per pt report and neuropathy   BS fluctuate at home       PSH  inguinal abscess       FAMILY HISTORY:  NO DM   Father GF CAD   mom COPD ?    SOCIAL HISTORY: former smoker quit on saturday   lives in group home     REVIEW OF SYSTEMS:    Constitutional: No fever, no chills, no change in weight.    Eyes: No eye swelling, no  blurry vision, no redness, no loss of vision.    Neck: No neck pain, no change in voice.    Lungs: less shortness of breath, no wheezing, no cough    CV: No chest pain, no palpitations, no pain with walking.    GI: No nausea, no vomiting, +constipation, no diarrhea, no abdominal pain    : No urinary frequency, no blood in urine, no urinary burning, no difficulty voiding.    Musculoskeletal: No muscle pain, no joint pain, no swelling.    Skin: No rash, no infections.    Neurologic: No headaches, no weakness, no burning or pain in feet, no tremor.    Endocrine: No heat intolerance, no cold intolerance, no increased sweating, no shakiness between meals.    Psych: No depression, no anxiety, no trouble concentrating    MEDICATIONS  (STANDING):  ALBUTerol/ipratropium for Nebulization 3 milliLiter(s) Nebulizer every 4 hours  aspirin enteric coated 81 milliGRAM(s) Oral daily  azithromycin  IVPB 500 milliGRAM(s) IV Intermittent every 24 hours  benzonatate 100 milliGRAM(s) Oral every 8 hours  benztropine 1 milliGRAM(s) Oral three times a day  cefTRIAXone   IVPB      chlorhexidine 2% Cloths 1 Application(s) Topical daily  dextrose 5%. 1000 milliLiter(s) (50 mL/Hr) IV Continuous <Continuous>  dextrose 50% Injectable 12.5 Gram(s) IV Push once  dextrose 50% Injectable 25 Gram(s) IV Push once  dextrose 50% Injectable 25 Gram(s) IV Push once  docusate sodium 100 milliGRAM(s) Oral three times a day  enoxaparin Injectable 40 milliGRAM(s) SubCutaneous every 24 hours  insulin glargine Injectable (LANTUS) 40 Unit(s) SubCutaneous at bedtime  insulin lispro (HumaLOG) corrective regimen sliding scale   SubCutaneous Before meals and at bedtime  insulin lispro Injectable (HumaLOG) 10 Unit(s) SubCutaneous three times a day with meals  methylPREDNISolone sodium succinate Injectable 40 milliGRAM(s) IV Push every 8 hours  montelukast 10 milliGRAM(s) Oral daily  nicotine - 21 mG/24Hr(s) Patch 1 patch Transdermal daily  QUEtiapine 300 milliGRAM(s) Oral at bedtime  saccharomyces boulardii 250 milliGRAM(s) Oral two times a day  simvastatin 20 milliGRAM(s) Oral at bedtime  sodium chloride 0.9% lock flush 3 milliLiter(s) IV Push every 8 hours  theophylline ER Capsule 600 milliGRAM(s) Oral daily  ziprasidone 20 milliGRAM(s) Oral two times a day    MEDICATIONS  (PRN):  ALBUTerol    0.083% 2.5 milliGRAM(s) Nebulizer every 2 hours PRN Shortness of Breath and/or Wheezing  aluminum hydroxide/magnesium hydroxide/simethicone Suspension 30 milliLiter(s) Oral every 4 hours PRN Dyspepsia  clonazePAM  Tablet 2 milliGRAM(s) Oral three times a day PRN anxiety  dextrose 40% Gel 15 Gram(s) Oral once PRN Blood Glucose LESS THAN 70 milliGRAM(s)/deciliter  glucagon  Injectable 1 milliGRAM(s) IntraMuscular once PRN Glucose LESS THAN 70 milligrams/deciliter  guaiFENesin    Syrup 200 milliGRAM(s) Oral every 6 hours PRN Cough  ondansetron Injectable 4 milliGRAM(s) IV Push every 6 hours PRN Nausea      Allergies    Haldol (Dystonic RXN)    Intolerances          CAPILLARY BLOOD GLUCOSE    CAPILLARY BLOOD GLUCOSE      POCT Blood Glucose.: 294 mg/dL (2019 21:05)  POCT Blood Glucose.: 346 mg/dL (2019 16:44)  POCT Blood Glucose.: 465 mg/dL (2019 12:21)  POCT Blood Glucose.: 457 mg/dL (2019 10:28)  POCT Blood Glucose.: 479 mg/dL (2019 09:04)  POCT Blood Glucose.: >530 mg/dL (2019 08:01)  POCT Blood Glucose.: 490 mg/dL (2019 08:00)  POCT Blood Glucose.: >530 mg/dL (2019 07:59)    POCT Blood Glucose.: 294 mg/dL (2019 21:05)      PHYSICAL EXAM:    Vital Signs Last 24 Hrs  T(C): 37.1 (2019 16:48), Max: 37.1 (2019 16:48)  T(F): 98.8 (2019 16:48), Max: 98.8 (2019 16:48)  HR: 78 (2019 20:09) (72 - 88)  BP: 137/80 (2019 16:48) (110/69 - 137/80)  BP(mean): --  RR: 20 (2019 16:48) (19 - 20)  SpO2: 94% (2019 20:09) (91% - 97%)    General appearance: Well developed, well nourished.      Neck: Trachea midline. No thyroid enlargement.    Lungs:decreased BS bialterally      CV: Regular cardiac rhythm. No murmur. Carotid and pedal pulses intact.    Abdomen: Soft, non tender, no organomegaly or mass.    Musculoskeletal: No cyanosis, clubbing, or edema. No pedal lesions.    Skin: Warm and moist. No rash. No acanthosis.    Neuro: Cranial nerves intact. Normal motor and sensory function. DTR's normal.                LABS:                        11.4   13.0  )-----------( 220      ( 2019 07:44 )             35.3     06-18    134<L>  |  96<L>  |  21.0<H>  ----------------------------<  491<H>  4.4   |  26.0  |  0.73    Ca    9.2      2019 11:10  Phos  4.3     06-18  Mg     2.0         TPro  7.1  /  Alb  3.9  /  TBili  0.4  /  DBili  x   /  AST  16  /  ALT  <5  /  AlkPhos  106      Urinalysis Basic - ( 2019 21:56 )    Color: Yellow / Appearance: Clear / S.010 / pH: x  Gluc: x / Ketone: Negative  / Bili: Negative / Urobili: Negative mg/dL   Blood: x / Protein: 15 mg/dL / Nitrite: Negative   Leuk Esterase: Trace / RBC: 0-2 /HPF / WBC 0-2   Sq Epi: x / Non Sq Epi: Few / Bacteria: x      LIVER FUNCTIONS - ( 2019 02:26 )  Alb: 3.9 g/dL / Pro: 7.1 g/dL / ALK PHOS: 106 U/L / ALT: <5 U/L / AST: 16 U/L / GGT: x           Hemoglobin A1C, Whole Blood: 9.3 % ( @ 07:44)        CAPILLARY BLOOD GLUCOSE      RADIOLOGY & ADDITIONAL STUDIES:
[facility]  INFECTIOUS DISEASES AND INTERNAL MEDICINE at [place]  =======================================================  Jaspal Santana MD  Diplomates American Board of Internal Medicine and Infectious Diseases  Tel: [phone]      Fax: [phone]  =======================================================      N-[id number]  LILIANE ZAMORA     CC: Patient is a 53y old  Male who presents with a chief complaint of SOB, COPD exacerbation (21 Jun 2019 11:41)    54y/o  Male with h/o hypertension, diabetes mellitus, schizophrenia, COPD. Patient  presented with complaints of shortness of breath, productive yellow sputum and increasing oxygen requirements. Patient is a resident of a group home. In the ER patient was afebrile, leukocytosis to 16.9k. He was treated with IV steroids and Azithromycin and Ceftriaxone. Clinically he improved however continued to require O2 due to hypoxia so a CT chest was done which reported scattered ground-glass and tree-in-bud opacities, likely infectious in etiology. ID input was requested.       Past Medical & Surgical Hx:  Depression  GI bleed  Schizophrenia  Gastritis  Bronchitis  Hypertension  DM (diabetes mellitus)  No significant past surgical history      Social Hx:  Former smoker. Lives in group home      FAMILY HISTORY:  Father - Heart disease       Allergies  Haldol (Dystonic RXN)      Antibiotics:   azithromycin  IVPB 500 milliGRAM(s) IV Intermittent every 24 hours  cefTRIAXone   IVPB 1000 milliGRAM(s) IV Intermittent every 24 hours       REVIEW OF SYSTEMS:  CONSTITUTIONAL:  No Fever or chills. No wt loss. No sweats  HEENT:  No diplopia or blurred vision.  No earache, sore throat or runny nose.  CARDIOVASCULAR:  No pressure, squeezing, strangling, tightness, heaviness or aching about the chest, neck, axilla or epigastrium.  RESPIRATORY:  No cough, shortness of breath  GASTROINTESTINAL:  No nausea, vomiting or diarrhea.  GENITOURINARY:  No dysuria, frequency or urgency.   MUSCULOSKELETAL:  no joint aches, no muscle pain  SKIN:  No change in skin, hair or nails.  NEUROLOGIC:  No Headaches, seizures or weakness.  PSYCHIATRIC:  No disorder of thought or mood.  ENDOCRINE:  No heat or cold intolerance  HEMATOLOGICAL:  No easy bruising or bleeding.       Physical Exam:  Vital Signs Last 24 Hrs  T(C): 37.3 (21 Jun 2019 08:06), Max: 37.3 (21 Jun 2019 08:06)  T(F): 99.1 (21 Jun 2019 08:06), Max: 99.1 (21 Jun 2019 08:06)  HR: 76 (21 Jun 2019 08:06) (76 - 96)  BP: 117/66 (21 Jun 2019 08:06) (100/68 - 118/73)  RR: 20 (21 Jun 2019 08:06) (20 - 20)  SpO2: 95% (21 Jun 2019 08:06) (95% - 99%)  Height (cm): 185.42 (06-20 @ 16:41)  Weight (kg): 79.4 (06-20 @ 16:41)  BMI (kg/m2): 23.1 (06-20 @ 16:41)  BSA (m2): 2.03 (06-20 @ 16:41)      GEN: NAD, pleasant  HEENT: normocephalic and atraumatic. EOMI. PERRL.  Anicteric  NECK: Supple.   LUNGS: Clear to auscultation.  HEART: Regular rate and rhythm.  ABDOMEN: Soft, nontender, and nondistended.  Positive bowel sounds.    : No CVA tenderness  EXTREMITIES: Without any edema.  MSK: No joint swelling  NEUROLOGIC: No Focal Deficits  PSYCHIATRIC: Appropriate affect .  SKIN: No Rash      Labs:  06-21    132<L>  |  94<L>  |  15.0  ----------------------------<  193<H>  4.0   |  28.0  |  0.46<L>    Ca    8.8      21 Jun 2019 08:17  Mg     1.8     06-20    TPro  6.2<L>  /  Alb  3.3  /  TBili  0.3<L>  /  DBili  x   /  AST  7   /  ALT  6   /  AlkPhos  74  06-21      LIVER FUNCTIONS - ( 21 Jun 2019 08:17 )  Alb: 3.3 g/dL / Pro: 6.2 g/dL / ALK PHOS: 74 U/L / ALT: 6 U/L / AST: 7 U/L / GGT: x               RECENT CULTURES:  06-17 @ 11:12    RVP  NotDetec      06-17 @ 02:30 .Blood     No growth at 48 hours            EXAM:  CT CHEST                        PROCEDURE DATE:  06/20/2019    INTERPRETATION:  CLINICAL INFORMATION: Hypoxia. Evaluate for worsening infiltrates.  COMPARISON: Chest x-ray 6/17/2019 and CT chest/abdomen/pelvis 2/20/2011  PROCEDURE:   CT of the Chest was performed without intravenous contrast.  Sagittal and coronal reformats were performed.  FINDINGS:  LUNGS AND AIRWAYS: There is narrowing of the trachea in the transverse   dimension which can be seen in the setting of COPD. There is mild   bronchiectasis. There are scattered patchy groundglass opacities and   nodular opacities in a tree-in-bud type configuration, likely infectious   in etiology (greater involvement of the right lung). There are also   scattered foci of air density (for example right upper lobe series 2   image 54 measuring 6 mm) which are either cavitations or focally dilated bronchioles.    PLEURA: No pleural effusion.  MEDIASTINUM AND YAMILA: No lymphadenopathy. There is esophageal wall thickening.  VESSELS: Thoracic aorta normal in caliber. There are coronary artery calcifications.  HEART: Heart size is normal. Small amount of pericardial fluid.  CHEST WALL AND LOWER NECK: No enlarged axillary lymph nodes.  VISUALIZED UPPER ABDOMEN: Within normal limits.  BONES: Mild compression deformities of the T7-T9 vertebral bodies.. Old   right ninth rib fracture.  IMPRESSION:   1. Scattered groundglass and tree-in-bud opacities, likely infectious in etiology.  2. Mild bronchiectasis.  3. Scattered foci of air density, either small cavitations or focally dilated bronchi.  4. Esophageal wall thickening.  The differential for the lung findings includes microbacterium avium   infection; clinical correlation and follow-up are recommended.
PULMONARY CONSULT NOTE      LILIANE ZAMORAVERONICA-[id number]    Patient is a 53y old  Male who presents with a chief complaint of SOB, COPD exacerbation (20 Jun 2019 17:33)      HISTORY OF PRESENT ILLNESS:    Mod COPD  Nicotine dependence  Group Home  CT with subtle suggestion on RADHA    MEDICATIONS  (STANDING):  aspirin enteric coated 81 milliGRAM(s) Oral daily  azithromycin  IVPB 500 milliGRAM(s) IV Intermittent every 24 hours  benztropine 1 milliGRAM(s) Oral three times a day  cefTRIAXone   IVPB 1000 milliGRAM(s) IV Intermittent every 24 hours  cefTRIAXone   IVPB      chlorhexidine 2% Cloths 1 Application(s) Topical daily  dextrose 5%. 1000 milliLiter(s) (50 mL/Hr) IV Continuous <Continuous>  dextrose 50% Injectable 12.5 Gram(s) IV Push once  dextrose 50% Injectable 25 Gram(s) IV Push once  dextrose 50% Injectable 25 Gram(s) IV Push once  docusate sodium 100 milliGRAM(s) Oral three times a day  enoxaparin Injectable 40 milliGRAM(s) SubCutaneous every 24 hours  insulin glargine Injectable (LANTUS) 15 Unit(s) SubCutaneous every morning  insulin glargine Injectable (LANTUS) 45 Unit(s) SubCutaneous at bedtime  insulin lispro (HumaLOG) corrective regimen sliding scale   SubCutaneous Before meals and at bedtime  insulin lispro Injectable (HumaLOG) 14 Unit(s) SubCutaneous three times a day with meals  methylPREDNISolone sodium succinate Injectable 40 milliGRAM(s) IV Push every 12 hours  montelukast 10 milliGRAM(s) Oral daily  nicotine - 21 mG/24Hr(s) Patch 1 patch Transdermal daily  QUEtiapine 300 milliGRAM(s) Oral at bedtime  saccharomyces boulardii 250 milliGRAM(s) Oral two times a day  simvastatin 20 milliGRAM(s) Oral at bedtime  sodium chloride 0.9% lock flush 3 milliLiter(s) IV Push every 8 hours  theophylline ER Capsule 600 milliGRAM(s) Oral daily  ziprasidone 20 milliGRAM(s) Oral two times a day      MEDICATIONS  (PRN):  ALBUTerol    0.083% 2.5 milliGRAM(s) Nebulizer every 2 hours PRN Shortness of Breath and/or Wheezing  ALBUTerol/ipratropium for Nebulization 3 milliLiter(s) Nebulizer every 6 hours PRN Shortness of Breath and/or Wheezing  aluminum hydroxide/magnesium hydroxide/simethicone Suspension 30 milliLiter(s) Oral every 4 hours PRN Dyspepsia  clonazePAM  Tablet 2 milliGRAM(s) Oral three times a day PRN anxiety  dextrose 40% Gel 15 Gram(s) Oral once PRN Blood Glucose LESS THAN 70 milliGRAM(s)/deciliter  glucagon  Injectable 1 milliGRAM(s) IntraMuscular once PRN Glucose LESS THAN 70 milligrams/deciliter  guaiFENesin    Syrup 200 milliGRAM(s) Oral every 6 hours PRN Cough  ondansetron Injectable 4 milliGRAM(s) IV Push every 6 hours PRN Nausea      Allergies    Haldol (Dystonic RXN)    Intolerances        PAST MEDICAL & SURGICAL HISTORY:  Depression  GI bleed  Schizophrenia  Gastritis  Bronchitis  Hypertension  DM (diabetes mellitus)  No significant past surgical history      FAMILY HISTORY:      SOCIAL HISTORY  Smoking History:     REVIEW OF SYSTEMS:    CONSTITUTIONAL:  No fevers, chills, sweats    HEENT:  Eyes:  No diplopia or blurred vision. ENT:  No earache, sore throat or runny nose.    CARDIOVASCULAR:  No pressure, squeezing, tightness, or heaviness about the chest; no palpitations.    RESPIRATORY:  No cough, shortness of breath, PND or orthopnea. Mild SOBOE    GASTROINTESTINAL:  No abdominal pain, nausea, vomiting or diarrhea.    GENITOURINARY:  No dysuria, frequency or urgency.    NEUROLOGIC:  No paresthesias, fasciculations, seizures or weakness.    PSYCHIATRIC:  No disorder of thought or mood.    Vital Signs Last 24 Hrs  T(C): 37.3 (21 Jun 2019 08:06), Max: 37.3 (21 Jun 2019 08:06)  T(F): 99.1 (21 Jun 2019 08:06), Max: 99.1 (21 Jun 2019 08:06)  HR: 76 (21 Jun 2019 08:06) (76 - 96)  BP: 117/66 (21 Jun 2019 08:06) (100/68 - 118/73)  BP(mean): --  RR: 20 (21 Jun 2019 08:06) (20 - 20)  SpO2: 95% (21 Jun 2019 08:06) (95% - 100%)    PHYSICAL EXAMINATION:    GENERAL: The patient is a well-developed, well-nourished _____in no apparent distress.     HEENT: Head is normocephalic and atraumatic. Extraocular muscles are intact. Mucous membranes are moist.     NECK: Supple.     LUNGS: Clear to auscultation without wheezing, rales, or rhonchi. Respirations unlabored    HEART: Regular rate and rhythm without murmur.    ABDOMEN: Soft, nontender, and nondistended.  No hepatosplenomegaly is noted.    EXTREMITIES: Without any cyanosis, clubbing, rash, lesions or edema.    NEUROLOGIC: Grossly intact.      LABS:    06-21    132<L>  |  94<L>  |  15.0  ----------------------------<  193<H>  4.0   |  28.0  |  0.46<L>    Ca    8.8      21 Jun 2019 08:17  Mg     1.8     06-20    TPro  6.2<L>  /  Alb  3.3  /  TBili  0.3<L>  /  DBili  x   /  AST  7   /  ALT  6   /  AlkPhos  74  06-21          MICROBIOLOGY: Unrevealing	    RADIOLOGY & ADDITIONAL STUDIES:    CT on 6/20/19  FINDINGS:    LUNGS AND AIRWAYS: There is narrowing of the trachea in the transverse   dimension which can be seen in the setting of COPD. There is mild   bronchiectasis. There are scattered patchy groundglass opacities and   nodular opacities in a tree-in-bud type configuration, likely infectious   in etiology (greater involvement of the right lung). There are also   scattered foci of air density (for example right upper lobe series 2   image 54 measuring 6 mm) which are either cavitations or focally dilated   bronchioles.      PLEURA: No pleural effusion.    MEDIASTINUM AND YAMILA: No lymphadenopathy. There is esophageal wall  thickening.    VESSELS: Thoracic aorta normal in caliber. There are coronary artery   calcifications.    HEART: Heart size is normal. Small amount of pericardial fluid.    CHEST WALL AND LOWER NECK: No enlarged axillary lymph nodes.    VISUALIZED UPPER ABDOMEN: Within normal limits.    BONES: Mild compression deformities of the T7-T9 vertebral bodies.. Old   right ninth rib fracture.    IMPRESSION:     1. Scattered groundglass and tree-in-bud opacities, likely infectious in   etiology.    2. Mild bronchiectasis.    3. Scattered foci of air density, either small cavitations or focally   dilated bronchi.    4. Esophageal wall thickening.    The differential for the lung findings includes microbacterium avium   infection; clinical correlation and follow-up are recommended.      RANDEE WEBER   This document has been electronically signed. Jun 20 2019  5:01PM

## 2019-06-21 NOTE — SWALLOW BEDSIDE ASSESSMENT ADULT - SWALLOW EVAL: DIAGNOSIS
Oral& pharyngeal stage of swallow clinically judged to be WFL for puree, regular, & thin liquids with no overt s/s of penetration & aspiration.

## 2019-06-22 LAB
ANION GAP SERPL CALC-SCNC: 9 MMOL/L — SIGNIFICANT CHANGE UP (ref 5–17)
BUN SERPL-MCNC: 13 MG/DL — SIGNIFICANT CHANGE UP (ref 8–20)
CALCIUM SERPL-MCNC: 8.2 MG/DL — LOW (ref 8.6–10.2)
CHLORIDE SERPL-SCNC: 93 MMOL/L — LOW (ref 98–107)
CO2 SERPL-SCNC: 29 MMOL/L — SIGNIFICANT CHANGE UP (ref 22–29)
CREAT SERPL-MCNC: 0.48 MG/DL — LOW (ref 0.5–1.3)
CULTURE RESULTS: SIGNIFICANT CHANGE UP
CULTURE RESULTS: SIGNIFICANT CHANGE UP
GLUCOSE BLDC GLUCOMTR-MCNC: 247 MG/DL — HIGH (ref 70–99)
GLUCOSE BLDC GLUCOMTR-MCNC: 272 MG/DL — HIGH (ref 70–99)
GLUCOSE BLDC GLUCOMTR-MCNC: 92 MG/DL — SIGNIFICANT CHANGE UP (ref 70–99)
GLUCOSE BLDC GLUCOMTR-MCNC: 98 MG/DL — SIGNIFICANT CHANGE UP (ref 70–99)
GLUCOSE SERPL-MCNC: 250 MG/DL — HIGH (ref 70–115)
HCT VFR BLD CALC: 33.1 % — LOW (ref 42–52)
HGB BLD-MCNC: 10.4 G/DL — LOW (ref 14–18)
LEGIONELLA AG UR QL: NEGATIVE — SIGNIFICANT CHANGE UP
MAGNESIUM SERPL-MCNC: 2.1 MG/DL — SIGNIFICANT CHANGE UP (ref 1.6–2.6)
MCHC RBC-ENTMCNC: 24.4 PG — LOW (ref 27–31)
MCHC RBC-ENTMCNC: 31.4 G/DL — LOW (ref 32–36)
MCV RBC AUTO: 77.7 FL — LOW (ref 80–94)
PHOSPHATE SERPL-MCNC: 2.3 MG/DL — LOW (ref 2.4–4.7)
PLATELET # BLD AUTO: 247 K/UL — SIGNIFICANT CHANGE UP (ref 150–400)
POTASSIUM SERPL-MCNC: 4 MMOL/L — SIGNIFICANT CHANGE UP (ref 3.5–5.3)
POTASSIUM SERPL-SCNC: 4 MMOL/L — SIGNIFICANT CHANGE UP (ref 3.5–5.3)
RBC # BLD: 4.26 M/UL — LOW (ref 4.6–6.2)
RBC # FLD: 16.3 % — HIGH (ref 11–15.6)
SODIUM SERPL-SCNC: 131 MMOL/L — LOW (ref 135–145)
SPECIMEN SOURCE: SIGNIFICANT CHANGE UP
SPECIMEN SOURCE: SIGNIFICANT CHANGE UP
WBC # BLD: 10.2 K/UL — SIGNIFICANT CHANGE UP (ref 4.8–10.8)
WBC # FLD AUTO: 10.2 K/UL — SIGNIFICANT CHANGE UP (ref 4.8–10.8)

## 2019-06-22 PROCEDURE — 99233 SBSQ HOSP IP/OBS HIGH 50: CPT

## 2019-06-22 PROCEDURE — 99232 SBSQ HOSP IP/OBS MODERATE 35: CPT

## 2019-06-22 RX ORDER — POLYETHYLENE GLYCOL 3350 17 G/17G
17 POWDER, FOR SOLUTION ORAL
Refills: 0 | Status: DISCONTINUED | OUTPATIENT
Start: 2019-06-22 | End: 2019-06-25

## 2019-06-22 RX ADMIN — SODIUM CHLORIDE 3 MILLILITER(S): 9 INJECTION INTRAMUSCULAR; INTRAVENOUS; SUBCUTANEOUS at 05:25

## 2019-06-22 RX ADMIN — MONTELUKAST 10 MILLIGRAM(S): 4 TABLET, CHEWABLE ORAL at 12:51

## 2019-06-22 RX ADMIN — CHLORHEXIDINE GLUCONATE 1 APPLICATION(S): 213 SOLUTION TOPICAL at 12:52

## 2019-06-22 RX ADMIN — ONDANSETRON 4 MILLIGRAM(S): 8 TABLET, FILM COATED ORAL at 01:31

## 2019-06-22 RX ADMIN — ENOXAPARIN SODIUM 40 MILLIGRAM(S): 100 INJECTION SUBCUTANEOUS at 12:52

## 2019-06-22 RX ADMIN — Medication 17 UNIT(S): at 08:33

## 2019-06-22 RX ADMIN — Medication 250 MILLIGRAM(S): at 17:00

## 2019-06-22 RX ADMIN — Medication 1 MILLIGRAM(S): at 12:50

## 2019-06-22 RX ADMIN — SODIUM CHLORIDE 3 MILLILITER(S): 9 INJECTION INTRAMUSCULAR; INTRAVENOUS; SUBCUTANEOUS at 22:00

## 2019-06-22 RX ADMIN — Medication 4: at 08:33

## 2019-06-22 RX ADMIN — Medication 100 MILLIGRAM(S): at 20:58

## 2019-06-22 RX ADMIN — Medication 100 MILLIGRAM(S): at 12:51

## 2019-06-22 RX ADMIN — SODIUM CHLORIDE 3 MILLILITER(S): 9 INJECTION INTRAMUSCULAR; INTRAVENOUS; SUBCUTANEOUS at 13:14

## 2019-06-22 RX ADMIN — Medication 600 MILLIGRAM(S): at 12:50

## 2019-06-22 RX ADMIN — POLYETHYLENE GLYCOL 3350 17 GRAM(S): 17 POWDER, FOR SOLUTION ORAL at 17:00

## 2019-06-22 RX ADMIN — Medication 1 MILLIGRAM(S): at 20:58

## 2019-06-22 RX ADMIN — AZITHROMYCIN 255 MILLIGRAM(S): 500 TABLET, FILM COATED ORAL at 11:15

## 2019-06-22 RX ADMIN — ZIPRASIDONE HYDROCHLORIDE 20 MILLIGRAM(S): 20 CAPSULE ORAL at 05:23

## 2019-06-22 RX ADMIN — INSULIN GLARGINE 45 UNIT(S): 100 INJECTION, SOLUTION SUBCUTANEOUS at 21:01

## 2019-06-22 RX ADMIN — Medication 10 MILLIGRAM(S): at 17:03

## 2019-06-22 RX ADMIN — Medication 81 MILLIGRAM(S): at 12:51

## 2019-06-22 RX ADMIN — ZIPRASIDONE HYDROCHLORIDE 20 MILLIGRAM(S): 20 CAPSULE ORAL at 17:00

## 2019-06-22 RX ADMIN — Medication 17 UNIT(S): at 12:51

## 2019-06-22 RX ADMIN — CEFTRIAXONE 100 MILLIGRAM(S): 500 INJECTION, POWDER, FOR SOLUTION INTRAMUSCULAR; INTRAVENOUS at 12:52

## 2019-06-22 RX ADMIN — Medication 250 MILLIGRAM(S): at 05:22

## 2019-06-22 RX ADMIN — ONDANSETRON 4 MILLIGRAM(S): 8 TABLET, FILM COATED ORAL at 08:34

## 2019-06-22 RX ADMIN — SODIUM CHLORIDE 125 MILLILITER(S): 9 INJECTION INTRAMUSCULAR; INTRAVENOUS; SUBCUTANEOUS at 08:40

## 2019-06-22 RX ADMIN — Medication 1 MILLIGRAM(S): at 05:22

## 2019-06-22 RX ADMIN — ONDANSETRON 4 MILLIGRAM(S): 8 TABLET, FILM COATED ORAL at 22:30

## 2019-06-22 RX ADMIN — Medication 6: at 12:51

## 2019-06-22 RX ADMIN — SIMVASTATIN 20 MILLIGRAM(S): 20 TABLET, FILM COATED ORAL at 20:57

## 2019-06-22 RX ADMIN — Medication 40 MILLIGRAM(S): at 05:22

## 2019-06-22 RX ADMIN — Medication 100 MILLIGRAM(S): at 05:22

## 2019-06-22 RX ADMIN — QUETIAPINE FUMARATE 300 MILLIGRAM(S): 200 TABLET, FILM COATED ORAL at 20:58

## 2019-06-22 RX ADMIN — INSULIN GLARGINE 20 UNIT(S): 100 INJECTION, SOLUTION SUBCUTANEOUS at 08:38

## 2019-06-22 RX ADMIN — SODIUM CHLORIDE 125 MILLILITER(S): 9 INJECTION INTRAMUSCULAR; INTRAVENOUS; SUBCUTANEOUS at 01:31

## 2019-06-22 NOTE — PROGRESS NOTE ADULT - SUBJECTIVE AND OBJECTIVE BOX
Patient: LILIANE ZAMORA [id number] 53y Male                           Internal Medicine Hospitalist Progress Note    Initial HPI:  54 y/o male with history of COPD on home 02 2-3 liters, smokes 2.5 packs of cig per day, DM-2, Psychiatric disorder, HLD, sent in from [facility] group home for increasing SOB, cough with thick sputum, diffuse wheezing. Patient states multiple sick contacts in group home. Patient denies fever, chills, N/V, CP, /GI complaints. In the ED patient with full field wheezing, cough, and conversational dyspnea. Patient with low grade temp, leukocytosis. No infiltrate on CXR, lactate normal. Patient with mild improvement but still does not feel at baseline. Patient poor historian, denies any other complaints at this time (2019 05:36)    Interval History:    ____________________PHYSICAL EXAM:  Vitals reviewed as indicated below  GENERAL:  NAD  HEENT: NCAT  CARDIOVASCULAR:  S1, S2  LUNGS: CTAB  ABDOMEN:  soft, (-) tenderness, (-) distension, (+) bowel sounds, (-) guarding, (-) rebound (-) rigidity  EXTREMITIES:  no cyanosis / clubbing / edema.   ____________________      BACKGROUND:  HEALTH ISSUES - PROBLEM Dx:  Schizophrenia, unspecified type: Schizophrenia, unspecified type  Essential hypertension: Essential hypertension  Type 2 diabetes mellitus without complication, with long-term current use of insulin: Type 2 diabetes mellitus without complication, with long-term current use of insulin  Viral bronchitis: Viral bronchitis  Hypoxia: Hypoxia  COPD exacerbation: COPD exacerbation        Allergies    Haldol (Dystonic RXN)    Intolerances      PAST MEDICAL & SURGICAL HISTORY:  Depression  GI bleed  Schizophrenia  Gastritis  Bronchitis  Hypertension  DM (diabetes mellitus)  No significant past surgical history        VITALS:  Vital Signs Last 24 Hrs  T(C): 36.8 (2019 08:04), Max: 36.8 (2019 08:04)  T(F): 98.3 (2019 08:04), Max: 98.3 (2019 08:04)  HR: 82 (2019 08:04) (77 - 107)  BP: 107/72 (2019 08:04) (107/72 - 112/80)  BP(mean): --  RR: 18 (2019 08:04) (18 - 20)  SpO2: 96% (2019 08:04) (92% - 99%) Daily     Daily Weight in k.3 (2019 11:32)  CAPILLARY BLOOD GLUCOSE      POCT Blood Glucose.: 272 mg/dL (2019 12:07)  POCT Blood Glucose.: 247 mg/dL (2019 08:08)  POCT Blood Glucose.: 211 mg/dL (2019 21:02)  POCT Blood Glucose.: 182 mg/dL (2019 16:56)    I&O's Summary    2019 07:01  -  2019 07:00  --------------------------------------------------------  IN: 6025 mL / OUT: 0 mL / NET: 6025 mL          LABS:                        10.4   10.2  )-----------( 247      ( 2019 05:21 )             33.1     06-22    131<L>  |  93<L>  |  13.0  ----------------------------<  250<H>  4.0   |  29.0  |  0.48<L>    Ca    8.2<L>      2019 05:21  Phos  2.3     06-22  Mg     2.1     06-22    TPro  6.2<L>  /  Alb  3.3  /  TBili  0.3<L>  /  DBili  x   /  AST  7   /  ALT  6   /  AlkPhos  74  06-21      LIVER FUNCTIONS - ( 2019 08:17 )  Alb: 3.3 g/dL / Pro: 6.2 g/dL / ALK PHOS: 74 U/L / ALT: 6 U/L / AST: 7 U/L / GGT: x                     MEDICATIONS:  MEDICATIONS  (STANDING):  aspirin enteric coated 81 milliGRAM(s) Oral daily  azithromycin  IVPB 500 milliGRAM(s) IV Intermittent every 24 hours  benztropine 1 milliGRAM(s) Oral three times a day  cefTRIAXone   IVPB 1000 milliGRAM(s) IV Intermittent every 24 hours  chlorhexidine 2% Cloths 1 Application(s) Topical daily  dextrose 5%. 1000 milliLiter(s) (50 mL/Hr) IV Continuous <Continuous>  dextrose 50% Injectable 12.5 Gram(s) IV Push once  dextrose 50% Injectable 25 Gram(s) IV Push once  dextrose 50% Injectable 25 Gram(s) IV Push once  docusate sodium 100 milliGRAM(s) Oral three times a day  enoxaparin Injectable 40 milliGRAM(s) SubCutaneous every 24 hours  insulin glargine Injectable (LANTUS) 45 Unit(s) SubCutaneous at bedtime  insulin glargine Injectable (LANTUS) 20 Unit(s) SubCutaneous every morning  insulin lispro (HumaLOG) corrective regimen sliding scale   SubCutaneous Before meals and at bedtime  insulin lispro Injectable (HumaLOG) 17 Unit(s) SubCutaneous three times a day with meals  montelukast 10 milliGRAM(s) Oral daily  nicotine - 21 mG/24Hr(s) Patch 1 patch Transdermal daily  polyethylene glycol 3350 17 Gram(s) Oral two times a day  predniSONE   Tablet 40 milliGRAM(s) Oral daily  QUEtiapine 300 milliGRAM(s) Oral at bedtime  saccharomyces boulardii 250 milliGRAM(s) Oral two times a day  simvastatin 20 milliGRAM(s) Oral at bedtime  sodium chloride 0.9% lock flush 3 milliLiter(s) IV Push every 8 hours  sodium chloride 0.9%. 1000 milliLiter(s) (125 mL/Hr) IV Continuous <Continuous>  theophylline ER Capsule 600 milliGRAM(s) Oral daily  ziprasidone 20 milliGRAM(s) Oral two times a day    MEDICATIONS  (PRN):  ALBUTerol    0.083% 2.5 milliGRAM(s) Nebulizer every 2 hours PRN Shortness of Breath and/or Wheezing  ALBUTerol/ipratropium for Nebulization 3 milliLiter(s) Nebulizer every 6 hours PRN Shortness of Breath and/or Wheezing  aluminum hydroxide/magnesium hydroxide/simethicone Suspension 30 milliLiter(s) Oral every 4 hours PRN Dyspepsia  bisacodyl Suppository 10 milliGRAM(s) Rectal daily PRN Constipation  clonazePAM  Tablet 2 milliGRAM(s) Oral three times a day PRN anxiety  dextrose 40% Gel 15 Gram(s) Oral once PRN Blood Glucose LESS THAN 70 milliGRAM(s)/deciliter  glucagon  Injectable 1 milliGRAM(s) IntraMuscular once PRN Glucose LESS THAN 70 milligrams/deciliter  guaiFENesin    Syrup 200 milliGRAM(s) Oral every 6 hours PRN Cough  ondansetron Injectable 4 milliGRAM(s) IV Push every 6 hours PRN Nausea

## 2019-06-22 NOTE — PROGRESS NOTE ADULT - ASSESSMENT
53 yr old male with hypertension, diabetes mellitus, schizophrenia, COPD presented with complaints of shortness of breath, productive yellow sputum and increasing oxygen requirements. Patient is a resident of a group home with recent sick contacts. IV steroids and antibiotics were initiated. He was noted to have uncontrolled diabetes, Lantus dose was adjusted. Pre meal insulin was added, endocrine evaluation was requested. Ceftriaxone was added. Verified with patient, he denies being on oxygen at assisted. He is currently requiring supplemental oxygen. Steroids were tapered. Fingersticks normalized. Given persistent hypoxia, CT chest was ordered which showed scattered ground-glass and tree-in-bud opacities, likely infectious in etiology. Mild bronchiectasis. Scattered foci of air density, either small cavitations or focally dilated bronchi. Esophageal wall thickening, differential for the lung findings includes microbacterium avium infection. ID and pulmonary was consulted.  Responded well to antibiotics, steroid therapy and nebulizers.

## 2019-06-22 NOTE — DIETITIAN INITIAL EVALUATION ADULT. - OTHER INFO
Pt admitted with COPD exacerbation.  Pt with hx depression, DM, HTN, schizophrenia.  Pt with good po intake at meal per RN and per breakfast tray observation at bedside this morning.  Unable to obtain full nutrition hx at this time- pt may benefit from dash/tlc, cons cho nutrition education when more awake.

## 2019-06-22 NOTE — PROGRESS NOTE ADULT - SUBJECTIVE AND OBJECTIVE BOX
[facility]  INFECTIOUS DISEASES AND INTERNAL MEDICINE at [place]  =======================================================  Jaspal Santana MD  Diplomates American Board of Internal Medicine and Infectious Diseases  Tel: [phone]      Fax: [phone]  =======================================================    LILIANE ZAMORA [id number]    Follow up: Pneumonia    No fever or chills  No diarrhea  No abdominal pain      Allergies:  Haldol (Dystonic RXN)      Antibiotics:   azithromycin  IVPB 500 milliGRAM(s) IV Intermittent every 24 hours  cefTRIAXone   IVPB 1000 milliGRAM(s) IV Intermittent every 24 hours       REVIEW OF SYSTEMS:  CONSTITUTIONAL:  No Fever or chills. No wt loss. No sweats  HEENT:  No diplopia or blurred vision.  No earache, sore throat or runny nose.  CARDIOVASCULAR:  No pressure, squeezing, strangling, tightness, heaviness or aching about the chest, neck, axilla or epigastrium.  RESPIRATORY:  No cough, shortness of breath  GASTROINTESTINAL:  No nausea, vomiting or diarrhea.  GENITOURINARY:  No dysuria, frequency or urgency.   MUSCULOSKELETAL:  no joint aches, no muscle pain  SKIN:  No change in skin, hair or nails.  NEUROLOGIC:  No Headaches, seizures or weakness.  PSYCHIATRIC:  No disorder of thought or mood.  ENDOCRINE:  No heat or cold intolerance  HEMATOLOGICAL:  No easy bruising or bleeding.       Physical Exam:  Vital Signs Last 24 Hrs  T(C): 36.6 (22 Jun 2019 16:36), Max: 36.8 (22 Jun 2019 08:04)  T(F): 97.9 (22 Jun 2019 16:36), Max: 98.3 (22 Jun 2019 08:04)  HR: 84 (22 Jun 2019 16:36) (82 - 107)  BP: 112/71 (22 Jun 2019 16:36) (107/72 - 112/73)  RR: 18 (22 Jun 2019 16:36) (18 - 18)  SpO2: 95% (22 Jun 2019 16:36) (95% - 100%)      GEN: NAD, pleasant  HEENT: normocephalic and atraumatic. EOMI. PERRL.  Anicteric  NECK: Supple.   LUNGS: Clear to auscultation.  HEART: Regular rate and rhythm.  ABDOMEN: Soft, nontender, and nondistended.  Positive bowel sounds.    : No CVA tenderness  EXTREMITIES: Without any edema.  MSK: No joint swelling  NEUROLOGIC: No Focal Deficits  PSYCHIATRIC: Appropriate affect .  SKIN: No Rash      Labs:  06-22    131<L>  |  93<L>  |  13.0  ----------------------------<  250<H>  4.0   |  29.0  |  0.48<L>    Ca    8.2<L>      22 Jun 2019 05:21  Phos  2.3     06-22  Mg     2.1     06-22    TPro  6.2<L>  /  Alb  3.3  /  TBili  0.3<L>  /  DBili  x   /  AST  7   /  ALT  6   /  AlkPhos  74  06-21                          10.4   10.2  )-----------( 247      ( 22 Jun 2019 05:21 )             33.1       LIVER FUNCTIONS - ( 21 Jun 2019 08:17 )  Alb: 3.3 g/dL / Pro: 6.2 g/dL / ALK PHOS: 74 U/L / ALT: 6 U/L / AST: 7 U/L / GGT: x             Procalcitonin, Serum (06.21.19 @ 16:08)    Procalcitonin, Serum: 0.03: Reference range change as of 3/21/2019 ng/mL      Rapid HIV-1/2 Antibody (06.21.19 @ 16:08)    Rapid HIV-1/2 Antibody: Nonreact      Legionella pneumophila Antigen, Urine (06.21.19 @ 22:52)    Legionella Antigen, Urine: Negative      RECENT CULTURES:  06-17 @ 11:12    RVP  NotDete      06-17 @ 02:30 .Blood     No growth at 5 days.

## 2019-06-22 NOTE — PROGRESS NOTE ADULT - ASSESSMENT
54y/o  Male with h/o hypertension, diabetes mellitus, schizophrenia, COPD. Patient  presented with complaints of shortness of breath, productive yellow sputum and increasing oxygen requirements. Patient is a resident of a group home. In the ER patient was afebrile, leukocytosis to 16.9k. He was treated with IV steroids and Azithromycin and Ceftriaxone. Clinically he improved however continued to require O2 due to hypoxia so a CT chest was done which reported scattered ground-glass and tree-in-bud opacities, likely infectious in etiology.      Pneumonia  Hypoxia  Former smoker      - Blood cultures negative    - RVP negative  - Urine for legionella negative  - CT Chest reviewed, d/w Dr Hitchcock, repeat imaging and work up to r/o RADHA can be done as outpatient  - Procalcitonin level  0.03, not suggestive of infection   - HIV testing is negative  - swallow eval noted  - Continue Azithromycin, pulmonary wants to treat for 2 - 4 weeks  - Continue Ceftriaxone, last day 6/23/19  - Trend Fever  - Trend Leukocytosis  - Pulmonary follow up as outpatient      Will Sign Off. Please call PRN.

## 2019-06-22 NOTE — PROGRESS NOTE ADULT - PROBLEM SELECTOR PLAN 1
CT chest noted, pulmonary eval noted, Prednisone taper, continue supplemental oxygen. Duoneb, outpatient pulmonary follow up in 2 weeks.  Plan for d/c to [facility] on 6/24.

## 2019-06-23 DIAGNOSIS — E87.1 HYPO-OSMOLALITY AND HYPONATREMIA: ICD-10-CM

## 2019-06-23 LAB
GLUCOSE BLDC GLUCOMTR-MCNC: 105 MG/DL — HIGH (ref 70–99)
GLUCOSE BLDC GLUCOMTR-MCNC: 158 MG/DL — HIGH (ref 70–99)
GLUCOSE BLDC GLUCOMTR-MCNC: 204 MG/DL — HIGH (ref 70–99)
GLUCOSE BLDC GLUCOMTR-MCNC: 93 MG/DL — SIGNIFICANT CHANGE UP (ref 70–99)

## 2019-06-23 PROCEDURE — 99232 SBSQ HOSP IP/OBS MODERATE 35: CPT

## 2019-06-23 RX ORDER — INSULIN LISPRO 100/ML
12 VIAL (ML) SUBCUTANEOUS
Refills: 0 | Status: DISCONTINUED | OUTPATIENT
Start: 2019-06-23 | End: 2019-06-25

## 2019-06-23 RX ADMIN — Medication 100 MILLIGRAM(S): at 12:31

## 2019-06-23 RX ADMIN — Medication 2: at 12:36

## 2019-06-23 RX ADMIN — CHLORHEXIDINE GLUCONATE 1 APPLICATION(S): 213 SOLUTION TOPICAL at 12:32

## 2019-06-23 RX ADMIN — INSULIN GLARGINE 20 UNIT(S): 100 INJECTION, SOLUTION SUBCUTANEOUS at 08:29

## 2019-06-23 RX ADMIN — SODIUM CHLORIDE 125 MILLILITER(S): 9 INJECTION INTRAMUSCULAR; INTRAVENOUS; SUBCUTANEOUS at 21:32

## 2019-06-23 RX ADMIN — POLYETHYLENE GLYCOL 3350 17 GRAM(S): 17 POWDER, FOR SOLUTION ORAL at 17:18

## 2019-06-23 RX ADMIN — Medication 100 MILLIGRAM(S): at 05:42

## 2019-06-23 RX ADMIN — SODIUM CHLORIDE 125 MILLILITER(S): 9 INJECTION INTRAMUSCULAR; INTRAVENOUS; SUBCUTANEOUS at 12:31

## 2019-06-23 RX ADMIN — SODIUM CHLORIDE 125 MILLILITER(S): 9 INJECTION INTRAMUSCULAR; INTRAVENOUS; SUBCUTANEOUS at 04:14

## 2019-06-23 RX ADMIN — ENOXAPARIN SODIUM 40 MILLIGRAM(S): 100 INJECTION SUBCUTANEOUS at 12:31

## 2019-06-23 RX ADMIN — MONTELUKAST 10 MILLIGRAM(S): 4 TABLET, CHEWABLE ORAL at 12:31

## 2019-06-23 RX ADMIN — POLYETHYLENE GLYCOL 3350 17 GRAM(S): 17 POWDER, FOR SOLUTION ORAL at 05:42

## 2019-06-23 RX ADMIN — Medication 600 MILLIGRAM(S): at 12:32

## 2019-06-23 RX ADMIN — Medication 1 MILLIGRAM(S): at 05:42

## 2019-06-23 RX ADMIN — Medication 12 UNIT(S): at 17:17

## 2019-06-23 RX ADMIN — AZITHROMYCIN 255 MILLIGRAM(S): 500 TABLET, FILM COATED ORAL at 11:11

## 2019-06-23 RX ADMIN — Medication 17 UNIT(S): at 08:27

## 2019-06-23 RX ADMIN — Medication 1 MILLIGRAM(S): at 12:31

## 2019-06-23 RX ADMIN — SODIUM CHLORIDE 3 MILLILITER(S): 9 INJECTION INTRAMUSCULAR; INTRAVENOUS; SUBCUTANEOUS at 07:16

## 2019-06-23 RX ADMIN — Medication 1 MILLIGRAM(S): at 21:33

## 2019-06-23 RX ADMIN — SIMVASTATIN 20 MILLIGRAM(S): 20 TABLET, FILM COATED ORAL at 21:33

## 2019-06-23 RX ADMIN — Medication 4: at 08:27

## 2019-06-23 RX ADMIN — CEFTRIAXONE 100 MILLIGRAM(S): 500 INJECTION, POWDER, FOR SOLUTION INTRAMUSCULAR; INTRAVENOUS at 12:31

## 2019-06-23 RX ADMIN — ZIPRASIDONE HYDROCHLORIDE 20 MILLIGRAM(S): 20 CAPSULE ORAL at 05:42

## 2019-06-23 RX ADMIN — Medication 81 MILLIGRAM(S): at 12:31

## 2019-06-23 RX ADMIN — SODIUM CHLORIDE 3 MILLILITER(S): 9 INJECTION INTRAMUSCULAR; INTRAVENOUS; SUBCUTANEOUS at 22:00

## 2019-06-23 RX ADMIN — QUETIAPINE FUMARATE 300 MILLIGRAM(S): 200 TABLET, FILM COATED ORAL at 21:33

## 2019-06-23 RX ADMIN — ONDANSETRON 4 MILLIGRAM(S): 8 TABLET, FILM COATED ORAL at 04:14

## 2019-06-23 RX ADMIN — SODIUM CHLORIDE 3 MILLILITER(S): 9 INJECTION INTRAMUSCULAR; INTRAVENOUS; SUBCUTANEOUS at 12:38

## 2019-06-23 RX ADMIN — Medication 250 MILLIGRAM(S): at 05:41

## 2019-06-23 RX ADMIN — INSULIN GLARGINE 45 UNIT(S): 100 INJECTION, SOLUTION SUBCUTANEOUS at 21:34

## 2019-06-23 RX ADMIN — Medication 40 MILLIGRAM(S): at 05:42

## 2019-06-23 RX ADMIN — ZIPRASIDONE HYDROCHLORIDE 20 MILLIGRAM(S): 20 CAPSULE ORAL at 17:18

## 2019-06-23 RX ADMIN — Medication 250 MILLIGRAM(S): at 17:18

## 2019-06-23 RX ADMIN — Medication 100 MILLIGRAM(S): at 21:33

## 2019-06-23 NOTE — PROGRESS NOTE ADULT - SUBJECTIVE AND OBJECTIVE BOX
INTERVAL HPI/OVERNIGHT EVENTS:  follow up t2DM    pt has been feeling some nausea again today along with constipaiton - took miralax    may not be drinking enough water     MEDICATIONS  (STANDING):  aspirin enteric coated 81 milliGRAM(s) Oral daily  azithromycin  IVPB 500 milliGRAM(s) IV Intermittent every 24 hours  benztropine 1 milliGRAM(s) Oral three times a day  chlorhexidine 2% Cloths 1 Application(s) Topical daily  dextrose 5%. 1000 milliLiter(s) (50 mL/Hr) IV Continuous <Continuous>  dextrose 50% Injectable 12.5 Gram(s) IV Push once  dextrose 50% Injectable 25 Gram(s) IV Push once  dextrose 50% Injectable 25 Gram(s) IV Push once  docusate sodium 100 milliGRAM(s) Oral three times a day  enoxaparin Injectable 40 milliGRAM(s) SubCutaneous every 24 hours  insulin glargine Injectable (LANTUS) 45 Unit(s) SubCutaneous at bedtime  insulin glargine Injectable (LANTUS) 20 Unit(s) SubCutaneous every morning  insulin lispro (HumaLOG) corrective regimen sliding scale   SubCutaneous Before meals and at bedtime  insulin lispro Injectable (HumaLOG) 12 Unit(s) SubCutaneous three times a day before meals  montelukast 10 milliGRAM(s) Oral daily  nicotine - 21 mG/24Hr(s) Patch 1 patch Transdermal daily  polyethylene glycol 3350 17 Gram(s) Oral two times a day  predniSONE   Tablet 40 milliGRAM(s) Oral daily  QUEtiapine 300 milliGRAM(s) Oral at bedtime  saccharomyces boulardii 250 milliGRAM(s) Oral two times a day  simvastatin 20 milliGRAM(s) Oral at bedtime  sodium chloride 0.9% lock flush 3 milliLiter(s) IV Push every 8 hours  sodium chloride 0.9%. 1000 milliLiter(s) (125 mL/Hr) IV Continuous <Continuous>  theophylline ER Capsule 600 milliGRAM(s) Oral daily  ziprasidone 20 milliGRAM(s) Oral two times a day    MEDICATIONS  (PRN):  ALBUTerol    0.083% 2.5 milliGRAM(s) Nebulizer every 2 hours PRN Shortness of Breath and/or Wheezing  ALBUTerol/ipratropium for Nebulization 3 milliLiter(s) Nebulizer every 6 hours PRN Shortness of Breath and/or Wheezing  aluminum hydroxide/magnesium hydroxide/simethicone Suspension 30 milliLiter(s) Oral every 4 hours PRN Dyspepsia  bisacodyl Suppository 10 milliGRAM(s) Rectal daily PRN Constipation  clonazePAM  Tablet 2 milliGRAM(s) Oral three times a day PRN anxiety  dextrose 40% Gel 15 Gram(s) Oral once PRN Blood Glucose LESS THAN 70 milliGRAM(s)/deciliter  glucagon  Injectable 1 milliGRAM(s) IntraMuscular once PRN Glucose LESS THAN 70 milligrams/deciliter  guaiFENesin    Syrup 200 milliGRAM(s) Oral every 6 hours PRN Cough  ondansetron Injectable 4 milliGRAM(s) IV Push every 6 hours PRN Nausea      Allergies    Haldol (Dystonic RXN)    Intolerances        Review of systems:    Vital Signs Last 24 Hrs  T(C): 37.1 (23 Jun 2019 16:24), Max: 37.1 (23 Jun 2019 08:00)  T(F): 98.7 (23 Jun 2019 16:24), Max: 98.8 (23 Jun 2019 08:00)  HR: 82 (23 Jun 2019 16:24) (82 - 89)  BP: 97/67 (23 Jun 2019 16:24) (97/67 - 122/82)  BP(mean): --  RR: 18 (23 Jun 2019 16:24) (18 - 19)  SpO2: 98% (23 Jun 2019 16:24) (97% - 98%)    PHYSICAL EXAM:      Constitutional: NAD, well-groomed, well-developed  HEENT: PERRLA, EOMI, no exophalmos  Neck: No LAD, No JVD, trachea midline, no thyroid enlargement  Back: Normal spine flexure, No CVA tenderness  Respiratory: CTAB  Cardiovascular: S1 and S2, RRR, no M/G/R  Extremities: No peripheral edema, no pedal lesions  s        LABS:                        10.4   10.2  )-----------( 247      ( 22 Jun 2019 05:21 )             33.1     06-22    131<L>  |  93<L>  |  13.0  ----------------------------<  250<H>  4.0   |  29.0  |  0.48<L>    Ca    8.2<L>      22 Jun 2019 05:21  Phos  2.3     06-22  Mg     2.1     06-22            CAPILLARY BLOOD GLUCOSE  CAPILLARY BLOOD GLUCOSE      POCT Blood Glucose.: 93 mg/dL (23 Jun 2019 21:31)  POCT Blood Glucose.: 105 mg/dL (23 Jun 2019 17:16)  POCT Blood Glucose.: 158 mg/dL (23 Jun 2019 12:17)  POCT Blood Glucose.: 204 mg/dL (23 Jun 2019 08:24)      RADIOLOGY & ADDITIONAL TESTS:

## 2019-06-23 NOTE — PROGRESS NOTE ADULT - SUBJECTIVE AND OBJECTIVE BOX
Patient: LILIANE ZAMORA [id number] 53y Male                           Internal Medicine Hospitalist Progress Note    Initial HPI:  52 y/o male with history of COPD on home 02 2-3 liters, smokes 2.5 packs of cig per day, DM-2, Psychiatric disorder, HLD, sent in from [place] [facility] [place] for increasing SOB, cough with thick sputum, diffuse wheezing. Patient states multiple sick contacts in group home. Patient denies fever, chills, N/V, CP, /GI complaints. In the ED patient with full field wheezing, cough, and conversational dyspnea. Patient with low grade temp, leukocytosis. No infiltrate on CXR, lactate normal. Patient with mild improvement but still does not feel at baseline. Patient poor historian, denies any other complaints at this time (2019 05:36)    Denies complaints.  No SOB.  No cough / fever/ chills.  No additional complaints.     ____________________PHYSICAL EXAM:  Vitals reviewed as indicated below  GENERAL:  NAD Alert and Oriented x 3   PSYCH: flat affect  HEENT: NCAT  CARDIOVASCULAR:  S1, S2  LUNGS: CTAB  ABDOMEN:  soft, (-) tenderness, (-) distension, (+) bowel sounds, (-) guarding, (-) rebound (-) rigidity  EXTREMITIES:  no cyanosis / clubbing / edema.   ____________________    VITALS:  Vital Signs Last 24 Hrs  T(C): 37.1 (2019 08:00), Max: 37.1 (2019 08:00)  T(F): 98.8 (2019 08:00), Max: 98.8 (2019 08:00)  HR: 89 (2019 08:00) (84 - 98)  BP: 122/82 (2019 08:00) (112/71 - 122/82)  BP(mean): --  RR: 19 (2019 08:00) (18 - 19)  SpO2: 97% (2019 08:00) (95% - 100%) Daily     Daily Weight in k.3 (2019 11:32)  CAPILLARY BLOOD GLUCOSE      POCT Blood Glucose.: 204 mg/dL (2019 08:24)  POCT Blood Glucose.: 98 mg/dL (2019 20:55)  POCT Blood Glucose.: 92 mg/dL (2019 16:59)  POCT Blood Glucose.: 272 mg/dL (2019 12:07)    I&O's Summary    2019 07:01  -  2019 07:00  --------------------------------------------------------  IN: 1625 mL / OUT: 0 mL / NET: 1625 mL        LABS:                        10.4   10.2  )-----------( 247      ( 2019 05:21 )             33.1     06-22    131<L>  |  93<L>  |  13.0  ----------------------------<  250<H>  4.0   |  29.0  |  0.48<L>    Ca    8.2<L>      2019 05:21  Phos  2.3     06-22  Mg     2.1     06-22                    MEDICATIONS:  ALBUTerol    0.083% 2.5 milliGRAM(s) Nebulizer every 2 hours PRN  ALBUTerol/ipratropium for Nebulization 3 milliLiter(s) Nebulizer every 6 hours PRN  aluminum hydroxide/magnesium hydroxide/simethicone Suspension 30 milliLiter(s) Oral every 4 hours PRN  aspirin enteric coated 81 milliGRAM(s) Oral daily  azithromycin  IVPB 500 milliGRAM(s) IV Intermittent every 24 hours  benztropine 1 milliGRAM(s) Oral three times a day  bisacodyl Suppository 10 milliGRAM(s) Rectal daily PRN  cefTRIAXone   IVPB 1000 milliGRAM(s) IV Intermittent every 24 hours  chlorhexidine 2% Cloths 1 Application(s) Topical daily  clonazePAM  Tablet 2 milliGRAM(s) Oral three times a day PRN  dextrose 40% Gel 15 Gram(s) Oral once PRN  dextrose 5%. 1000 milliLiter(s) IV Continuous <Continuous>  dextrose 50% Injectable 12.5 Gram(s) IV Push once  dextrose 50% Injectable 25 Gram(s) IV Push once  dextrose 50% Injectable 25 Gram(s) IV Push once  docusate sodium 100 milliGRAM(s) Oral three times a day  enoxaparin Injectable 40 milliGRAM(s) SubCutaneous every 24 hours  glucagon  Injectable 1 milliGRAM(s) IntraMuscular once PRN  guaiFENesin    Syrup 200 milliGRAM(s) Oral every 6 hours PRN  insulin glargine Injectable (LANTUS) 45 Unit(s) SubCutaneous at bedtime  insulin glargine Injectable (LANTUS) 20 Unit(s) SubCutaneous every morning  insulin lispro (HumaLOG) corrective regimen sliding scale   SubCutaneous Before meals and at bedtime  insulin lispro Injectable (HumaLOG) 17 Unit(s) SubCutaneous three times a day with meals  montelukast 10 milliGRAM(s) Oral daily  nicotine - 21 mG/24Hr(s) Patch 1 patch Transdermal daily  ondansetron Injectable 4 milliGRAM(s) IV Push every 6 hours PRN  polyethylene glycol 3350 17 Gram(s) Oral two times a day  predniSONE   Tablet 40 milliGRAM(s) Oral daily  QUEtiapine 300 milliGRAM(s) Oral at bedtime  saccharomyces boulardii 250 milliGRAM(s) Oral two times a day  simvastatin 20 milliGRAM(s) Oral at bedtime  sodium chloride 0.9% lock flush 3 milliLiter(s) IV Push every 8 hours  sodium chloride 0.9%. 1000 milliLiter(s) IV Continuous <Continuous>  theophylline ER Capsule 600 milliGRAM(s) Oral daily  ziprasidone 20 milliGRAM(s) Oral two times a day

## 2019-06-23 NOTE — PROGRESS NOTE ADULT - ASSESSMENT
T2DM- will cont same rx for now    pt with intermittent nausea and consitpaiton   COPD exacerbation has improved  stillon 40 mg Prednsione- not tapered yet - will likely need to adjust insulin once tapering of steroids begins

## 2019-06-23 NOTE — PROGRESS NOTE ADULT - PROBLEM SELECTOR PLAN 1
CT chest noted, pulmonary eval noted, Prednisone taper, continue supplemental oxygen. Duoneb, outpatient pulmonary follow up in 2 weeks.  Plan for d/c to [facility] on 6/24.

## 2019-06-23 NOTE — PROGRESS NOTE ADULT - ASSESSMENT
53 yr old male with hypertension, diabetes mellitus, schizophrenia, COPD presented with complaints of shortness of breath, productive yellow sputum and increasing oxygen requirements. Patient is a resident of a group home with recent sick contacts. IV steroids and antibiotics were initiated. He was noted to have uncontrolled diabetes, Lantus dose was adjusted. Pre meal insulin was added, endocrine evaluation was requested. Ceftriaxone was added. Verified with patient, he denies being on oxygen at care home. He is currently requiring supplemental oxygen. Steroids were tapered. Fingersticks normalized. Given persistent hypoxia, CT chest was ordered which showed scattered ground-glass and tree-in-bud opacities, likely infectious in etiology. Mild bronchiectasis. Scattered foci of air density, either small cavitations or focally dilated bronchi. Esophageal wall thickening, differential for the lung findings includes microbacterium avium infection. ID and pulmonary was consulted.  Responded well to antibiotics, steroid therapy and nebulizers.

## 2019-06-24 ENCOUNTER — TRANSCRIPTION ENCOUNTER (OUTPATIENT)
Age: 54
End: 2019-06-24

## 2019-06-24 LAB
ANION GAP SERPL CALC-SCNC: 10 MMOL/L — SIGNIFICANT CHANGE UP (ref 5–17)
BUN SERPL-MCNC: 12 MG/DL — SIGNIFICANT CHANGE UP (ref 8–20)
CALCIUM SERPL-MCNC: 8.2 MG/DL — LOW (ref 8.6–10.2)
CHLORIDE SERPL-SCNC: 98 MMOL/L — SIGNIFICANT CHANGE UP (ref 98–107)
CO2 SERPL-SCNC: 25 MMOL/L — SIGNIFICANT CHANGE UP (ref 22–29)
CREAT SERPL-MCNC: 0.57 MG/DL — SIGNIFICANT CHANGE UP (ref 0.5–1.3)
GLUCOSE BLDC GLUCOMTR-MCNC: 128 MG/DL — HIGH (ref 70–99)
GLUCOSE BLDC GLUCOMTR-MCNC: 222 MG/DL — HIGH (ref 70–99)
GLUCOSE BLDC GLUCOMTR-MCNC: 223 MG/DL — HIGH (ref 70–99)
GLUCOSE BLDC GLUCOMTR-MCNC: 88 MG/DL — SIGNIFICANT CHANGE UP (ref 70–99)
GLUCOSE SERPL-MCNC: 197 MG/DL — HIGH (ref 70–115)
HCT VFR BLD CALC: 28.7 % — LOW (ref 42–52)
HGB BLD-MCNC: 9.1 G/DL — LOW (ref 14–18)
MCHC RBC-ENTMCNC: 24.7 PG — LOW (ref 27–31)
MCHC RBC-ENTMCNC: 31.7 G/DL — LOW (ref 32–36)
MCV RBC AUTO: 78 FL — LOW (ref 80–94)
PLATELET # BLD AUTO: 240 K/UL — SIGNIFICANT CHANGE UP (ref 150–400)
POTASSIUM SERPL-MCNC: 3.5 MMOL/L — SIGNIFICANT CHANGE UP (ref 3.5–5.3)
POTASSIUM SERPL-SCNC: 3.5 MMOL/L — SIGNIFICANT CHANGE UP (ref 3.5–5.3)
RBC # BLD: 3.68 M/UL — LOW (ref 4.6–6.2)
RBC # FLD: 17.5 % — HIGH (ref 11–15.6)
SODIUM SERPL-SCNC: 133 MMOL/L — LOW (ref 135–145)
WBC # BLD: 8.3 K/UL — SIGNIFICANT CHANGE UP (ref 4.8–10.8)
WBC # FLD AUTO: 8.3 K/UL — SIGNIFICANT CHANGE UP (ref 4.8–10.8)

## 2019-06-24 PROCEDURE — 93010 ELECTROCARDIOGRAM REPORT: CPT

## 2019-06-24 PROCEDURE — 99239 HOSP IP/OBS DSCHRG MGMT >30: CPT

## 2019-06-24 RX ORDER — NICOTINE POLACRILEX 2 MG
1 GUM BUCCAL
Qty: 0 | Refills: 0 | DISCHARGE
Start: 2019-06-24

## 2019-06-24 RX ORDER — INSULIN GLARGINE 100 [IU]/ML
20 INJECTION, SOLUTION SUBCUTANEOUS
Qty: 0 | Refills: 0 | DISCHARGE
Start: 2019-06-24

## 2019-06-24 RX ORDER — THEOPHYLLINE ANHYDROUS 200 MG
1 TABLET, EXTENDED RELEASE 12 HR ORAL
Qty: 0 | Refills: 0 | DISCHARGE

## 2019-06-24 RX ORDER — INSULIN LISPRO 100/ML
12 VIAL (ML) SUBCUTANEOUS
Qty: 0 | Refills: 0 | DISCHARGE
Start: 2019-06-24

## 2019-06-24 RX ORDER — SENNA PLUS 8.6 MG/1
1 TABLET ORAL
Qty: 0 | Refills: 0 | DISCHARGE

## 2019-06-24 RX ORDER — APIXABAN 2.5 MG/1
1 TABLET, FILM COATED ORAL
Qty: 0 | Refills: 0 | DISCHARGE

## 2019-06-24 RX ORDER — INSULIN GLARGINE 100 [IU]/ML
30 INJECTION, SOLUTION SUBCUTANEOUS
Qty: 0 | Refills: 0 | DISCHARGE

## 2019-06-24 RX ORDER — TRAVOPROST 0.04 MG/ML
1 SOLUTION/ DROPS OPHTHALMIC
Qty: 0 | Refills: 0 | DISCHARGE

## 2019-06-24 RX ORDER — SACCHAROMYCES BOULARDII 250 MG
1 POWDER IN PACKET (EA) ORAL
Qty: 0 | Refills: 0 | DISCHARGE
Start: 2019-06-24

## 2019-06-24 RX ORDER — INSULIN LISPRO 100/ML
100 VIAL (ML) SUBCUTANEOUS
Qty: 0 | Refills: 0 | DISCHARGE

## 2019-06-24 RX ORDER — BENZTROPINE MESYLATE 1 MG
1 TABLET ORAL
Qty: 0 | Refills: 0 | DISCHARGE
Start: 2019-06-24

## 2019-06-24 RX ORDER — THEOPHYLLINE ANHYDROUS 200 MG
0 TABLET, EXTENDED RELEASE 12 HR ORAL
Qty: 0 | Refills: 0 | DISCHARGE

## 2019-06-24 RX ORDER — POLYETHYLENE GLYCOL 3350 17 G/17G
17 POWDER, FOR SOLUTION ORAL
Qty: 0 | Refills: 0 | DISCHARGE
Start: 2019-06-24

## 2019-06-24 RX ORDER — MAGNESIUM OXIDE 400 MG ORAL TABLET 241.3 MG
1 TABLET ORAL
Qty: 0 | Refills: 0 | DISCHARGE

## 2019-06-24 RX ORDER — INSULIN GLARGINE 100 [IU]/ML
45 INJECTION, SOLUTION SUBCUTANEOUS
Qty: 0 | Refills: 0 | DISCHARGE
Start: 2019-06-24

## 2019-06-24 RX ADMIN — SODIUM CHLORIDE 3 MILLILITER(S): 9 INJECTION INTRAMUSCULAR; INTRAVENOUS; SUBCUTANEOUS at 05:26

## 2019-06-24 RX ADMIN — Medication 1 MILLIGRAM(S): at 05:19

## 2019-06-24 RX ADMIN — Medication 4: at 17:04

## 2019-06-24 RX ADMIN — Medication 12 UNIT(S): at 17:04

## 2019-06-24 RX ADMIN — Medication 1 MILLIGRAM(S): at 22:32

## 2019-06-24 RX ADMIN — MONTELUKAST 10 MILLIGRAM(S): 4 TABLET, CHEWABLE ORAL at 12:08

## 2019-06-24 RX ADMIN — Medication 250 MILLIGRAM(S): at 17:04

## 2019-06-24 RX ADMIN — Medication 40 MILLIGRAM(S): at 05:19

## 2019-06-24 RX ADMIN — ZIPRASIDONE HYDROCHLORIDE 20 MILLIGRAM(S): 20 CAPSULE ORAL at 17:04

## 2019-06-24 RX ADMIN — CHLORHEXIDINE GLUCONATE 1 APPLICATION(S): 213 SOLUTION TOPICAL at 12:09

## 2019-06-24 RX ADMIN — AZITHROMYCIN 255 MILLIGRAM(S): 500 TABLET, FILM COATED ORAL at 12:08

## 2019-06-24 RX ADMIN — SODIUM CHLORIDE 3 MILLILITER(S): 9 INJECTION INTRAMUSCULAR; INTRAVENOUS; SUBCUTANEOUS at 12:15

## 2019-06-24 RX ADMIN — SODIUM CHLORIDE 125 MILLILITER(S): 9 INJECTION INTRAMUSCULAR; INTRAVENOUS; SUBCUTANEOUS at 05:20

## 2019-06-24 RX ADMIN — Medication 4: at 08:17

## 2019-06-24 RX ADMIN — ZIPRASIDONE HYDROCHLORIDE 20 MILLIGRAM(S): 20 CAPSULE ORAL at 05:19

## 2019-06-24 RX ADMIN — SIMVASTATIN 20 MILLIGRAM(S): 20 TABLET, FILM COATED ORAL at 22:32

## 2019-06-24 RX ADMIN — Medication 600 MILLIGRAM(S): at 12:08

## 2019-06-24 RX ADMIN — Medication 100 MILLIGRAM(S): at 05:19

## 2019-06-24 RX ADMIN — ENOXAPARIN SODIUM 40 MILLIGRAM(S): 100 INJECTION SUBCUTANEOUS at 12:08

## 2019-06-24 RX ADMIN — POLYETHYLENE GLYCOL 3350 17 GRAM(S): 17 POWDER, FOR SOLUTION ORAL at 05:19

## 2019-06-24 RX ADMIN — INSULIN GLARGINE 45 UNIT(S): 100 INJECTION, SOLUTION SUBCUTANEOUS at 22:33

## 2019-06-24 RX ADMIN — Medication 12 UNIT(S): at 08:17

## 2019-06-24 RX ADMIN — Medication 250 MILLIGRAM(S): at 05:19

## 2019-06-24 RX ADMIN — Medication 81 MILLIGRAM(S): at 12:08

## 2019-06-24 RX ADMIN — Medication 1 MILLIGRAM(S): at 12:07

## 2019-06-24 RX ADMIN — INSULIN GLARGINE 20 UNIT(S): 100 INJECTION, SOLUTION SUBCUTANEOUS at 08:17

## 2019-06-24 RX ADMIN — QUETIAPINE FUMARATE 300 MILLIGRAM(S): 200 TABLET, FILM COATED ORAL at 22:32

## 2019-06-24 RX ADMIN — Medication 100 MILLIGRAM(S): at 22:32

## 2019-06-24 RX ADMIN — Medication 100 MILLIGRAM(S): at 12:07

## 2019-06-24 RX ADMIN — POLYETHYLENE GLYCOL 3350 17 GRAM(S): 17 POWDER, FOR SOLUTION ORAL at 17:04

## 2019-06-24 NOTE — PROGRESS NOTE ADULT - ASSESSMENT
53 yr old male with hypertension, diabetes mellitus, schizophrenia, COPD presented with complaints of shortness of breath, productive yellow sputum and increasing oxygen requirements. Patient is a resident of a group home with recent sick contacts. IV steroids and antibiotics were initiated. He was noted to have uncontrolled diabetes, Lantus dose was adjusted. Pre meal insulin was added, endocrine evaluation was requested. Ceftriaxone was added. Verified with patient, he denies being on oxygen at [facility]. He is currently requiring supplemental oxygen. Steroids were tapered. Fingersticks normalized. Given persistent hypoxia, CT chest was ordered which showed scattered ground-glass and tree-in-bud opacities, likely infectious in etiology. Mild bronchiectasis. Scattered foci of air density, either small cavitations or focally dilated bronchi. Esophageal wall thickening, differential for the lung findings includes microbacterium avium infection. ID and pulmonary was consulted.  Responded well to antibiotics, steroid therapy and nebulizers.

## 2019-06-24 NOTE — DISCHARGE NOTE NURSING/CASE MANAGEMENT/SOCIAL WORK - NSDCFUADDAPPT_GEN_ALL_CORE_FT
HEATHER LUNG DC FOLLOW UP APPT. ON 7/3 W/ DR. DESOUZA AT 10:30AM   [address]. [facility] [address] HEATHER LUNG DC FOLLOW UP APPT. ON 7/3 W/ DR. DESOUZA AT 10:30AM   [address]. [facility] [address]    DC MEDS WERE REVIEWED W/ THE PATIENT AT THE BEDSIDE W/ PHARMACIST AND HE DEMONSTRATES A GOOD UNDERSTANDING OF HIS  MEDICATIONS

## 2019-06-24 NOTE — DISCHARGE NOTE NURSING/CASE MANAGEMENT/SOCIAL WORK - NSDCDPATPORTLINK_GEN_ALL_CORE
You can access the [facility] Portal, offered by [facility], by registering with the following website: [email or web address]

## 2019-06-24 NOTE — PROGRESS NOTE ADULT - PROBLEM SELECTOR PLAN 4
ID sergey noted.  On Azithromycin. Outpatient workup of RADHA.  Supplemental oxygen.  Plan 2-4 weeks of Zithromax -> 7/17. ID sergey noted.  On Azithromycin. Outpatient workup of RADHA.  Supplemental oxygen.  Plan 2-4 weeks of Zithromax -> 7/17.  Check QT with EKG.

## 2019-06-24 NOTE — PROGRESS NOTE ADULT - PROBLEM SELECTOR PLAN 1
CT chest noted, pulmonary eval noted, Prednisone taper, continue supplemental oxygen. Duoneb, outpatient pulmonary follow up in 2 weeks.  Plan for d/c to [facility] on 6/24.

## 2019-06-24 NOTE — PROGRESS NOTE ADULT - SUBJECTIVE AND OBJECTIVE BOX
Patient: LILIANE ZAMORA [id number] 53y Male                           Internal Medicine Hospitalist Progress Note    Initial HPI:  54 y/o male with history of COPD on home 02 2-3 liters, smokes 2.5 packs of cig per day, DM-2, Schizophrenia, HLD, sent in from [facility] for increasing SOB, cough with thick sputum, diffuse wheezing.  Admitted for COPD exacerbation.  Seen by pulmonary.  CT Chest showed: 1. Scattered ground-glass and tree-in-bud opacities, likely infectious in etiology. 2. Mild bronchiectasis. 3. Scattered foci of air density, either small cavitations or focally dilated bronchi. 4. Esophageal wall thickening.  He was presumptively treated for RADHA, seen by ID. Now reports significant improvement in symptoms.  Per ID and pulmonary recommendations, outpatient workup of RADHA, continue 2-4 week course of Zithromax.     Denies complaints.  No SOB.  No cough / fever/ chills.  No additional complaints.  +BM    ____________________PHYSICAL EXAM:  Vitals reviewed as indicated below  GENERAL:  NAD Alert and Oriented x 3   PSYCH: flat affect  HEENT: NCAT  CARDIOVASCULAR:  S1, S2  LUNGS: CTAB  ABDOMEN:  soft, (-) tenderness, (-) distension, (+) bowel sounds, (-) guarding, (-) rebound (-) rigidity  EXTREMITIES:  no cyanosis / clubbing / edema.   ____________________    VITALS:  Vital Signs Last 24 Hrs  T(C): 37.3 (24 Jun 2019 08:56), Max: 37.6 (24 Jun 2019 00:15)  T(F): 99.1 (24 Jun 2019 08:56), Max: 99.6 (24 Jun 2019 00:15)  HR: 80 (24 Jun 2019 08:56) (80 - 89)  BP: 104/64 (24 Jun 2019 08:56) (97/67 - 117/75)  BP(mean): --  RR: 18 (24 Jun 2019 08:56) (18 - 18)  SpO2: 96% (24 Jun 2019 08:56) (96% - 98%) Daily     Daily   CAPILLARY BLOOD GLUCOSE      POCT Blood Glucose.: 222 mg/dL (24 Jun 2019 07:51)  POCT Blood Glucose.: 93 mg/dL (23 Jun 2019 21:31)  POCT Blood Glucose.: 105 mg/dL (23 Jun 2019 17:16)  POCT Blood Glucose.: 158 mg/dL (23 Jun 2019 12:17)    I&O's Summary    23 Jun 2019 07:01  -  24 Jun 2019 07:00  --------------------------------------------------------  IN: 1625 mL / OUT: 0 mL / NET: 1625 mL        LABS:                        9.1    8.3   )-----------( 240      ( 24 Jun 2019 08:15 )             28.7     06-24    133<L>  |  98  |  12.0  ----------------------------<  197<H>  3.5   |  25.0  |  0.57    Ca    8.2<L>      24 Jun 2019 08:15                    MEDICATIONS:  ALBUTerol    0.083% 2.5 milliGRAM(s) Nebulizer every 2 hours PRN  ALBUTerol/ipratropium for Nebulization 3 milliLiter(s) Nebulizer every 6 hours PRN  aluminum hydroxide/magnesium hydroxide/simethicone Suspension 30 milliLiter(s) Oral every 4 hours PRN  aspirin enteric coated 81 milliGRAM(s) Oral daily  azithromycin  IVPB 500 milliGRAM(s) IV Intermittent every 24 hours  benztropine 1 milliGRAM(s) Oral three times a day  bisacodyl Suppository 10 milliGRAM(s) Rectal daily PRN  chlorhexidine 2% Cloths 1 Application(s) Topical daily  clonazePAM  Tablet 2 milliGRAM(s) Oral three times a day PRN  dextrose 40% Gel 15 Gram(s) Oral once PRN  dextrose 5%. 1000 milliLiter(s) IV Continuous <Continuous>  dextrose 50% Injectable 12.5 Gram(s) IV Push once  dextrose 50% Injectable 25 Gram(s) IV Push once  dextrose 50% Injectable 25 Gram(s) IV Push once  docusate sodium 100 milliGRAM(s) Oral three times a day  enoxaparin Injectable 40 milliGRAM(s) SubCutaneous every 24 hours  glucagon  Injectable 1 milliGRAM(s) IntraMuscular once PRN  guaiFENesin    Syrup 200 milliGRAM(s) Oral every 6 hours PRN  insulin glargine Injectable (LANTUS) 45 Unit(s) SubCutaneous at bedtime  insulin glargine Injectable (LANTUS) 20 Unit(s) SubCutaneous every morning  insulin lispro (HumaLOG) corrective regimen sliding scale   SubCutaneous Before meals and at bedtime  insulin lispro Injectable (HumaLOG) 12 Unit(s) SubCutaneous three times a day before meals  montelukast 10 milliGRAM(s) Oral daily  nicotine - 21 mG/24Hr(s) Patch 1 patch Transdermal daily  ondansetron Injectable 4 milliGRAM(s) IV Push every 6 hours PRN  polyethylene glycol 3350 17 Gram(s) Oral two times a day  predniSONE   Tablet 40 milliGRAM(s) Oral daily  QUEtiapine 300 milliGRAM(s) Oral at bedtime  saccharomyces boulardii 250 milliGRAM(s) Oral two times a day  simvastatin 20 milliGRAM(s) Oral at bedtime  sodium chloride 0.9% lock flush 3 milliLiter(s) IV Push every 8 hours  sodium chloride 0.9%. 1000 milliLiter(s) IV Continuous <Continuous>  theophylline ER Capsule 600 milliGRAM(s) Oral daily  ziprasidone 20 milliGRAM(s) Oral two times a day

## 2019-06-25 ENCOUNTER — INBOUND DOCUMENT (OUTPATIENT)
Age: 54
End: 2019-06-25

## 2019-06-25 VITALS — OXYGEN SATURATION: 100 %

## 2019-06-25 LAB
GLUCOSE BLDC GLUCOMTR-MCNC: 135 MG/DL — HIGH (ref 70–99)
GLUCOSE BLDC GLUCOMTR-MCNC: 260 MG/DL — HIGH (ref 70–99)

## 2019-06-25 PROCEDURE — 83880 ASSAY OF NATRIURETIC PEPTIDE: CPT

## 2019-06-25 PROCEDURE — 80048 BASIC METABOLIC PNL TOTAL CA: CPT

## 2019-06-25 PROCEDURE — 87040 BLOOD CULTURE FOR BACTERIA: CPT

## 2019-06-25 PROCEDURE — 82962 GLUCOSE BLOOD TEST: CPT

## 2019-06-25 PROCEDURE — 85027 COMPLETE CBC AUTOMATED: CPT

## 2019-06-25 PROCEDURE — 87798 DETECT AGENT NOS DNA AMP: CPT

## 2019-06-25 PROCEDURE — 82150 ASSAY OF AMYLASE: CPT

## 2019-06-25 PROCEDURE — 81001 URINALYSIS AUTO W/SCOPE: CPT

## 2019-06-25 PROCEDURE — 99285 EMERGENCY DEPT VISIT HI MDM: CPT | Mod: 25

## 2019-06-25 PROCEDURE — 94640 AIRWAY INHALATION TREATMENT: CPT

## 2019-06-25 PROCEDURE — 99232 SBSQ HOSP IP/OBS MODERATE 35: CPT

## 2019-06-25 PROCEDURE — 84145 PROCALCITONIN (PCT): CPT

## 2019-06-25 PROCEDURE — 83690 ASSAY OF LIPASE: CPT

## 2019-06-25 PROCEDURE — 87633 RESP VIRUS 12-25 TARGETS: CPT

## 2019-06-25 PROCEDURE — 87641 MR-STAPH DNA AMP PROBE: CPT

## 2019-06-25 PROCEDURE — 93005 ELECTROCARDIOGRAM TRACING: CPT

## 2019-06-25 PROCEDURE — 92610 EVALUATE SWALLOWING FUNCTION: CPT

## 2019-06-25 PROCEDURE — 86703 HIV-1/HIV-2 1 RESULT ANTBDY: CPT

## 2019-06-25 PROCEDURE — 96374 THER/PROPH/DIAG INJ IV PUSH: CPT

## 2019-06-25 PROCEDURE — 36415 COLL VENOUS BLD VENIPUNCTURE: CPT

## 2019-06-25 PROCEDURE — 84484 ASSAY OF TROPONIN QUANT: CPT

## 2019-06-25 PROCEDURE — 82803 BLOOD GASES ANY COMBINATION: CPT

## 2019-06-25 PROCEDURE — 80053 COMPREHEN METABOLIC PANEL: CPT

## 2019-06-25 PROCEDURE — 71250 CT THORAX DX C-: CPT

## 2019-06-25 PROCEDURE — 84100 ASSAY OF PHOSPHORUS: CPT

## 2019-06-25 PROCEDURE — 83735 ASSAY OF MAGNESIUM: CPT

## 2019-06-25 PROCEDURE — 87449 NOS EACH ORGANISM AG IA: CPT

## 2019-06-25 PROCEDURE — 87640 STAPH A DNA AMP PROBE: CPT

## 2019-06-25 PROCEDURE — 71045 X-RAY EXAM CHEST 1 VIEW: CPT

## 2019-06-25 PROCEDURE — 83605 ASSAY OF LACTIC ACID: CPT

## 2019-06-25 PROCEDURE — 94760 N-INVAS EAR/PLS OXIMETRY 1: CPT

## 2019-06-25 PROCEDURE — 87486 CHLMYD PNEUM DNA AMP PROBE: CPT

## 2019-06-25 PROCEDURE — 87581 M.PNEUMON DNA AMP PROBE: CPT

## 2019-06-25 PROCEDURE — 83036 HEMOGLOBIN GLYCOSYLATED A1C: CPT

## 2019-06-25 RX ORDER — ATORVASTATIN CALCIUM 80 MG/1
1 TABLET, FILM COATED ORAL
Qty: 0 | Refills: 0 | DISCHARGE

## 2019-06-25 RX ORDER — SACCHAROMYCES BOULARDII 250 MG
1 POWDER IN PACKET (EA) ORAL
Qty: 28 | Refills: 0
Start: 2019-06-25

## 2019-06-25 RX ORDER — OMEPRAZOLE 10 MG/1
1 CAPSULE, DELAYED RELEASE ORAL
Qty: 0 | Refills: 0 | DISCHARGE

## 2019-06-25 RX ORDER — NICOTINE POLACRILEX 2 MG
1 GUM BUCCAL
Qty: 30 | Refills: 0
Start: 2019-06-25

## 2019-06-25 RX ORDER — MONTELUKAST 4 MG/1
1 TABLET, CHEWABLE ORAL
Qty: 0 | Refills: 0 | DISCHARGE

## 2019-06-25 RX ORDER — HALOPERIDOL DECANOATE 100 MG/ML
1 INJECTION INTRAMUSCULAR
Qty: 0 | Refills: 0 | DISCHARGE
Start: 2019-06-25

## 2019-06-25 RX ORDER — INSULIN DETEMIR 100/ML (3)
30 INSULIN PEN (ML) SUBCUTANEOUS
Qty: 0 | Refills: 0 | DISCHARGE
Start: 2019-06-25

## 2019-06-25 RX ORDER — INSULIN DETEMIR 100/ML (3)
30 INSULIN PEN (ML) SUBCUTANEOUS
Qty: 0 | Refills: 0 | DISCHARGE

## 2019-06-25 RX ORDER — THIAMINE MONONITRATE (VIT B1) 100 MG
1 TABLET ORAL
Qty: 0 | Refills: 0 | DISCHARGE

## 2019-06-25 RX ORDER — IPRATROPIUM/ALBUTEROL SULFATE 18-103MCG
3 AEROSOL WITH ADAPTER (GRAM) INHALATION
Qty: 0 | Refills: 0 | DISCHARGE

## 2019-06-25 RX ORDER — IPRATROPIUM/ALBUTEROL SULFATE 18-103MCG
3 AEROSOL WITH ADAPTER (GRAM) INHALATION
Qty: 1 | Refills: 0
Start: 2019-06-25

## 2019-06-25 RX ORDER — INSULIN GLARGINE 100 [IU]/ML
45 INJECTION, SOLUTION SUBCUTANEOUS
Qty: 0 | Refills: 0 | DISCHARGE
Start: 2019-06-25

## 2019-06-25 RX ORDER — THEOPHYLLINE ANHYDROUS 200 MG
1 TABLET, EXTENDED RELEASE 12 HR ORAL
Qty: 0 | Refills: 0 | DISCHARGE
Start: 2019-06-25

## 2019-06-25 RX ORDER — AZITHROMYCIN 500 MG/1
1 TABLET, FILM COATED ORAL
Qty: 14 | Refills: 0
Start: 2019-06-25

## 2019-06-25 RX ORDER — SACCHAROMYCES BOULARDII 250 MG
1 POWDER IN PACKET (EA) ORAL
Qty: 14 | Refills: 0
Start: 2019-06-25

## 2019-06-25 RX ORDER — DOCUSATE SODIUM 100 MG
1 CAPSULE ORAL
Qty: 90 | Refills: 0
Start: 2019-06-25

## 2019-06-25 RX ORDER — SIMVASTATIN 20 MG/1
1 TABLET, FILM COATED ORAL
Qty: 14 | Refills: 0
Start: 2019-06-25

## 2019-06-25 RX ORDER — THEOPHYLLINE ANHYDROUS 200 MG
1 TABLET, EXTENDED RELEASE 12 HR ORAL
Qty: 0 | Refills: 0 | DISCHARGE

## 2019-06-25 RX ORDER — AZITHROMYCIN 500 MG/1
1 TABLET, FILM COATED ORAL
Qty: 0 | Refills: 0 | DISCHARGE

## 2019-06-25 RX ORDER — SIMVASTATIN 20 MG/1
1 TABLET, FILM COATED ORAL
Qty: 0 | Refills: 0 | DISCHARGE

## 2019-06-25 RX ORDER — INSULIN LISPRO 100/ML
8 VIAL (ML) SUBCUTANEOUS
Qty: 0 | Refills: 0 | DISCHARGE
Start: 2019-06-25

## 2019-06-25 RX ORDER — THIAMINE MONONITRATE (VIT B1) 100 MG
1 TABLET ORAL
Qty: 14 | Refills: 0
Start: 2019-06-25

## 2019-06-25 RX ORDER — APIXABAN 2.5 MG/1
1 TABLET, FILM COATED ORAL
Qty: 0 | Refills: 0 | DISCHARGE
Start: 2019-06-25

## 2019-06-25 RX ORDER — NICOTINE POLACRILEX 2 MG
1 GUM BUCCAL
Qty: 14 | Refills: 0
Start: 2019-06-25

## 2019-06-25 RX ADMIN — MONTELUKAST 10 MILLIGRAM(S): 4 TABLET, CHEWABLE ORAL at 12:00

## 2019-06-25 RX ADMIN — Medication 81 MILLIGRAM(S): at 12:00

## 2019-06-25 RX ADMIN — Medication 40 MILLIGRAM(S): at 05:42

## 2019-06-25 RX ADMIN — Medication 100 MILLIGRAM(S): at 14:53

## 2019-06-25 RX ADMIN — Medication 250 MILLIGRAM(S): at 05:42

## 2019-06-25 RX ADMIN — Medication 1 MILLIGRAM(S): at 05:42

## 2019-06-25 RX ADMIN — INSULIN GLARGINE 20 UNIT(S): 100 INJECTION, SOLUTION SUBCUTANEOUS at 08:55

## 2019-06-25 RX ADMIN — Medication 600 MILLIGRAM(S): at 14:53

## 2019-06-25 RX ADMIN — POLYETHYLENE GLYCOL 3350 17 GRAM(S): 17 POWDER, FOR SOLUTION ORAL at 05:41

## 2019-06-25 RX ADMIN — CHLORHEXIDINE GLUCONATE 1 APPLICATION(S): 213 SOLUTION TOPICAL at 14:53

## 2019-06-25 RX ADMIN — Medication 12 UNIT(S): at 08:55

## 2019-06-25 RX ADMIN — Medication 12 UNIT(S): at 11:59

## 2019-06-25 RX ADMIN — Medication 1 MILLIGRAM(S): at 14:53

## 2019-06-25 RX ADMIN — Medication 100 MILLIGRAM(S): at 05:42

## 2019-06-25 NOTE — PROGRESS NOTE ADULT - SUBJECTIVE AND OBJECTIVE BOX
cc: f/u diabetes  INTERVAL HPI/OVERNIGHT EVENTS: no issues, going home later today    MEDICATIONS  (STANDING):  aspirin enteric coated 81 milliGRAM(s) Oral daily  azithromycin  IVPB 500 milliGRAM(s) IV Intermittent every 24 hours  benztropine 1 milliGRAM(s) Oral three times a day  chlorhexidine 2% Cloths 1 Application(s) Topical daily  dextrose 5%. 1000 milliLiter(s) (50 mL/Hr) IV Continuous <Continuous>  dextrose 50% Injectable 12.5 Gram(s) IV Push once  dextrose 50% Injectable 25 Gram(s) IV Push once  dextrose 50% Injectable 25 Gram(s) IV Push once  docusate sodium 100 milliGRAM(s) Oral three times a day  enoxaparin Injectable 40 milliGRAM(s) SubCutaneous every 24 hours  insulin glargine Injectable (LANTUS) 45 Unit(s) SubCutaneous at bedtime  insulin glargine Injectable (LANTUS) 20 Unit(s) SubCutaneous every morning  insulin lispro Injectable (HumaLOG) 12 Unit(s) SubCutaneous three times a day before meals  montelukast 10 milliGRAM(s) Oral daily  nicotine - 21 mG/24Hr(s) Patch 1 patch Transdermal daily  polyethylene glycol 3350 17 Gram(s) Oral two times a day  predniSONE   Tablet 40 milliGRAM(s) Oral daily  QUEtiapine 300 milliGRAM(s) Oral at bedtime  saccharomyces boulardii 250 milliGRAM(s) Oral two times a day  simvastatin 20 milliGRAM(s) Oral at bedtime  theophylline ER Capsule 600 milliGRAM(s) Oral daily    MEDICATIONS  (PRN):  ALBUTerol    0.083% 2.5 milliGRAM(s) Nebulizer every 2 hours PRN Shortness of Breath and/or Wheezing  ALBUTerol/ipratropium for Nebulization 3 milliLiter(s) Nebulizer every 6 hours PRN Shortness of Breath and/or Wheezing  aluminum hydroxide/magnesium hydroxide/simethicone Suspension 30 milliLiter(s) Oral every 4 hours PRN Dyspepsia  bisacodyl Suppository 10 milliGRAM(s) Rectal daily PRN Constipation  dextrose 40% Gel 15 Gram(s) Oral once PRN Blood Glucose LESS THAN 70 milliGRAM(s)/deciliter  glucagon  Injectable 1 milliGRAM(s) IntraMuscular once PRN Glucose LESS THAN 70 milligrams/deciliter  guaiFENesin    Syrup 200 milliGRAM(s) Oral every 6 hours PRN Cough  ondansetron Injectable 4 milliGRAM(s) IV Push every 6 hours PRN Nausea      Vital Signs Last 24 Hrs  T(C): 36.8 (25 Jun 2019 14:57), Max: 36.9 (25 Jun 2019 08:33)  T(F): 98.2 (25 Jun 2019 14:57), Max: 98.5 (25 Jun 2019 08:33)  HR: 60 (25 Jun 2019 14:57) (60 - 81)  BP: 132/73 (25 Jun 2019 14:57) (106/70 - 132/73)  BP(mean): --  RR: 16 (25 Jun 2019 14:57) (16 - 18)  SpO2: 98% (25 Jun 2019 14:57) (98% - 100%)    PHYSICAL EXAM:  Constitutional: NAD, well-groomed, well-developed  HEENT: PERRLA, EOMI  Respiratory: CTAB no w/r/r  Cardiovascular: S1 and S2, RRR, no M/G/R  Gastrointestinal: BS+, soft, NT/ND  Extremities: No peripheral edema  Musculoskeletal: 5/5 strength b/l upper and lower extremities        LABS:                        9.1    8.3   )-----------( 240      ( 24 Jun 2019 08:15 )             28.7     06-24    133<L>  |  98  |  12.0  ----------------------------<  197<H>  3.5   |  25.0  |  0.57    Ca    8.2<L>      24 Jun 2019 08:15    CAPILLARY BLOOD GLUCOSE  POCT Blood Glucose.: 260 mg/dL (25 Jun 2019 11:53)  POCT Blood Glucose.: 135 mg/dL (25 Jun 2019 08:13)  POCT Blood Glucose.: 128 mg/dL (24 Jun 2019 22:31)  POCT Blood Glucose.: 223 mg/dL (24 Jun 2019 16:58)  POCT Blood Glucose.: 88 mg/dL (24 Jun 2019 12:06)  POCT Blood Glucose.: 222 mg/dL (24 Jun 2019 07:51)  POCT Blood Glucose.: 93 mg/dL (23 Jun 2019 21:31)

## 2019-06-25 NOTE — PROGRESS NOTE ADULT - PROBLEM SELECTOR PLAN 3
FS improved. Continue Lantus and pre meal insulin
FS now low midday.  Will decrease premeal insulin.  Continue Lantus.  Endocrine followup.  FS are expected to improve with steroid taper.  Endocrine input is appreciated.
Continue Geodon, Klonopin, Cogentin and Seroquel.
Continue Geodon, Klonopin, Cogentin and Seroquel.
Endocrine evaluation appreciated. Will increase Lantus 45 units and pre meal insulin to 14 units. Continue am Lantus 15 units. Monitor FS.
FS moderately controlled.  Continue Lantus and pre meal insulin.  FS are expected to improve with steroid taper.  Endocrine input is appreciated.
Fingersticks improving, continue Lantus and pre meal insulin. Monitor FS.
Less hypoglycemia with change in premeal insulin.  Continue Lantus.  FS are expected to improve with steroid taper.  Endocrine input is appreciated.
Less hypoglycemia with change in premeal insulin.  Continue Lantus.  FS are expected to improve with steroid taper.  Endocrine input is appreciated.

## 2019-06-25 NOTE — PROGRESS NOTE ADULT - ASSESSMENT
53 yr old male with hypertension, diabetes mellitus, schizophrenia, COPD admit with COPD exacerbation  1. T2DM - glucoses well controlled  - cont current insulin regimen, will need lower insulin doses as steroid is tapered off  - pt should f/u in our office as outpatient  2. COPD - cont prednison per primary team  3. schizophrenia - cont psych meds

## 2019-06-25 NOTE — PROGRESS NOTE ADULT - PROBLEM SELECTOR PROBLEM 4
R/O RADHA (mycobacterium avium-intracellulare)
Type 2 diabetes mellitus without complication, with long-term current use of insulin
Type 2 diabetes mellitus without complication, with long-term current use of insulin

## 2019-06-25 NOTE — PROGRESS NOTE ADULT - PROVIDER SPECIALTY LIST ADULT
Endocrinology
Hospitalist
Infectious Disease
Hospitalist
Hospitalist

## 2019-06-25 NOTE — PROGRESS NOTE ADULT - PROBLEM SELECTOR PLAN 2
Continue Geodon, Klonopin, Cogentin and Seroquel.  Mood stable.
Continue Geodon, Klonopin, Cogentin and Seroquel.
Continue Geodon, Klonopin, Cogentin and Seroquel.
Continue Geodon, Klonopin, Cogentin and Seroquel.  Mood stable.
Monitor BP, not on medications.
Monitor BP, not on medications.
Continue Geodon, Klonopin, Cogentin and Seroquel.

## 2019-06-25 NOTE — PROGRESS NOTE ADULT - PROBLEM SELECTOR PROBLEM 3
Type 2 diabetes mellitus without complication, with long-term current use of insulin
Schizophrenia, unspecified type
Schizophrenia, unspecified type
Type 2 diabetes mellitus without complication, with long-term current use of insulin

## 2019-06-25 NOTE — PROGRESS NOTE ADULT - REASON FOR ADMISSION
SOB, COPD exacerbation

## 2019-06-25 NOTE — PROGRESS NOTE ADULT - SUBJECTIVE AND OBJECTIVE BOX
Patient: LILIANE ZAMORA [id number] 53y Male                           Internal Medicine Hospitalist Progress Note    Initial HPI:  52 y/o male with history of COPD on home 02 2-3 liters, smokes 2.5 packs of cig per day, DM-2, Schizophrenia, HLD, sent in from [facility] for increasing SOB, cough with thick sputum, diffuse wheezing.  Admitted for COPD exacerbation.  Seen by pulmonary.  CT Chest showed: 1. Scattered ground-glass and tree-in-bud opacities, likely infectious in etiology. 2. Mild bronchiectasis. 3. Scattered foci of air density, either small cavitations or focally dilated bronchi. 4. Esophageal wall thickening.  He was presumptively treated for RADHA, seen by ID. Now reports significant improvement in symptoms.  Per ID and pulmonary recommendations, outpatient workup of RADHA, continue 2-4 week course of Zithromax.     Denies complaints.  No SOB.  No cough / fever/ chills.  No additional complaints.     ____________________PHYSICAL EXAM:  Vitals reviewed as indicated below  GENERAL:  NAD Alert and Oriented x 3   PSYCH: flat affect  HEENT: NCAT  CARDIOVASCULAR:  S1, S2  LUNGS: CTAB  ABDOMEN:  soft, (-) tenderness, (-) distension, (+) bowel sounds, (-) guarding, (-) rebound (-) rigidity  EXTREMITIES:  no cyanosis / clubbing / edema.   ____________________      VITALS:  Vital Signs Last 24 Hrs  T(C): 36.9 (25 Jun 2019 08:33), Max: 37.1 (24 Jun 2019 16:28)  T(F): 98.5 (25 Jun 2019 08:33), Max: 98.7 (24 Jun 2019 16:28)  HR: 81 (25 Jun 2019 08:33) (72 - 81)  BP: 106/70 (25 Jun 2019 08:33) (106/70 - 112/68)  BP(mean): --  RR: 18 (25 Jun 2019 08:33) (18 - 19)  SpO2: 100% (25 Jun 2019 08:33) (99% - 100%) Daily     Daily   CAPILLARY BLOOD GLUCOSE      POCT Blood Glucose.: 260 mg/dL (25 Jun 2019 11:53)  POCT Blood Glucose.: 135 mg/dL (25 Jun 2019 08:13)  POCT Blood Glucose.: 128 mg/dL (24 Jun 2019 22:31)  POCT Blood Glucose.: 223 mg/dL (24 Jun 2019 16:58)    I&O's Summary      LABS:                        9.1    8.3   )-----------( 240      ( 24 Jun 2019 08:15 )             28.7     06-24    133<L>  |  98  |  12.0  ----------------------------<  197<H>  3.5   |  25.0  |  0.57    Ca    8.2<L>      24 Jun 2019 08:15                    MEDICATIONS:  ALBUTerol    0.083% 2.5 milliGRAM(s) Nebulizer every 2 hours PRN  ALBUTerol/ipratropium for Nebulization 3 milliLiter(s) Nebulizer every 6 hours PRN  aluminum hydroxide/magnesium hydroxide/simethicone Suspension 30 milliLiter(s) Oral every 4 hours PRN  aspirin enteric coated 81 milliGRAM(s) Oral daily  azithromycin  IVPB 500 milliGRAM(s) IV Intermittent every 24 hours  benztropine 1 milliGRAM(s) Oral three times a day  bisacodyl Suppository 10 milliGRAM(s) Rectal daily PRN  chlorhexidine 2% Cloths 1 Application(s) Topical daily  dextrose 40% Gel 15 Gram(s) Oral once PRN  dextrose 5%. 1000 milliLiter(s) IV Continuous <Continuous>  dextrose 50% Injectable 12.5 Gram(s) IV Push once  dextrose 50% Injectable 25 Gram(s) IV Push once  dextrose 50% Injectable 25 Gram(s) IV Push once  docusate sodium 100 milliGRAM(s) Oral three times a day  enoxaparin Injectable 40 milliGRAM(s) SubCutaneous every 24 hours  glucagon  Injectable 1 milliGRAM(s) IntraMuscular once PRN  guaiFENesin    Syrup 200 milliGRAM(s) Oral every 6 hours PRN  insulin glargine Injectable (LANTUS) 45 Unit(s) SubCutaneous at bedtime  insulin glargine Injectable (LANTUS) 20 Unit(s) SubCutaneous every morning  insulin lispro Injectable (HumaLOG) 12 Unit(s) SubCutaneous three times a day before meals  montelukast 10 milliGRAM(s) Oral daily  nicotine - 21 mG/24Hr(s) Patch 1 patch Transdermal daily  ondansetron Injectable 4 milliGRAM(s) IV Push every 6 hours PRN  polyethylene glycol 3350 17 Gram(s) Oral two times a day  predniSONE   Tablet 40 milliGRAM(s) Oral daily  QUEtiapine 300 milliGRAM(s) Oral at bedtime  saccharomyces boulardii 250 milliGRAM(s) Oral two times a day  simvastatin 20 milliGRAM(s) Oral at bedtime  theophylline ER Capsule 600 milliGRAM(s) Oral daily

## 2019-06-25 NOTE — PROGRESS NOTE ADULT - PROBLEM SELECTOR PROBLEM 2
Schizophrenia, unspecified type
Essential hypertension
Essential hypertension
Schizophrenia, unspecified type

## 2019-06-25 NOTE — PROGRESS NOTE ADULT - PROBLEM SELECTOR PLAN 4
ID sergey noted.  On Azithromycin. Outpatient workup of RADHA.  Supplemental oxygen.  Plan 2-4 weeks of Zithromax -> 7/17.  .

## 2019-06-25 NOTE — PROGRESS NOTE ADULT - ATTENDING COMMENTS
Discussed with patient and RN.
Pt's medication regimen as outpatient confirmed with [facility] [phone].  Pt had been on Eliquis, however, they were unclear on indication.  Anticoagulation is continued with the recommendation with PMD f/u regarding duration of therapy.
> Smoker - Nicotine Patch.  I strongly encouraged the patient to quit smoking.  I outlined the extensive negative impact of smoking on quality of life and the numerous consequences of continued smoking including lung disease, heart attack, stroke, vascular disease, malignancy, increased mortality, etc.  The patient understood these effects and agreed on smoking cessation.

## 2019-06-25 NOTE — PROGRESS NOTE ADULT - PROBLEM SELECTOR PLAN 1
CT chest noted, pulmonary eval noted, Prednisone taper, continue supplemental oxygen. Duoneb, outpatient pulmonary follow up in 2 weeks.  Plan for d/c to [facility] on 6/24.

## 2019-06-25 NOTE — PROGRESS NOTE ADULT - ASSESSMENT
53 yr old male with hypertension, diabetes mellitus, schizophrenia, COPD presented with complaints of shortness of breath, productive yellow sputum and increasing oxygen requirements. Patient is a resident of a group home with recent sick contacts. IV steroids and antibiotics were initiated. He was noted to have uncontrolled diabetes, Lantus dose was adjusted. Pre meal insulin was added, endocrine evaluation was requested. Ceftriaxone was added. Verified with patient, he denies being on oxygen at [facility]. He is currently requiring supplemental oxygen. Steroids were tapered. Fingersticks normalized. Given persistent hypoxia, CT chest was ordered which showed scattered ground-glass and tree-in-bud opacities, likely infectious in etiology. Mild bronchiectasis. Scattered foci of air density, either small cavitations or focally dilated bronchi. Esophageal wall thickening, differential for the lung findings includes microbacterium avium infection. ID and pulmonary was consulted.  Responded well to antibiotics, steroid therapy and nebulizers.

## 2019-07-03 ENCOUNTER — APPOINTMENT (OUTPATIENT)
Dept: PULMONOLOGY | Facility: CLINIC | Age: 54
End: 2019-07-03

## 2019-07-25 ENCOUNTER — CLINICAL ADVICE (OUTPATIENT)
Age: 54
End: 2019-07-25

## 2019-07-26 ENCOUNTER — APPOINTMENT (OUTPATIENT)
Dept: ENDOCRINOLOGY | Facility: CLINIC | Age: 54
End: 2019-07-26
Payer: MEDICARE

## 2019-07-26 VITALS
HEART RATE: 96 BPM | HEIGHT: 72 IN | DIASTOLIC BLOOD PRESSURE: 60 MMHG | BODY MASS INDEX: 22.48 KG/M2 | SYSTOLIC BLOOD PRESSURE: 120 MMHG | WEIGHT: 166 LBS

## 2019-07-26 DIAGNOSIS — Z87.01 PERSONAL HISTORY OF PNEUMONIA (RECURRENT): ICD-10-CM

## 2019-07-26 DIAGNOSIS — Z87.09 PERSONAL HISTORY OF OTHER DISEASES OF THE RESPIRATORY SYSTEM: ICD-10-CM

## 2019-07-26 DIAGNOSIS — Z86.59 PERSONAL HISTORY OF OTHER MENTAL AND BEHAVIORAL DISORDERS: ICD-10-CM

## 2019-07-26 LAB — GLUCOSE BLDC GLUCOMTR-MCNC: 126

## 2019-07-26 PROCEDURE — 99214 OFFICE O/P EST MOD 30 MIN: CPT | Mod: 25

## 2019-07-26 PROCEDURE — 82962 GLUCOSE BLOOD TEST: CPT

## 2019-07-26 RX ORDER — CLONAZEPAM 2 MG/1
2 TABLET ORAL
Refills: 0 | Status: DISCONTINUED | COMMUNITY
End: 2019-07-26

## 2019-07-26 RX ORDER — SERTRALINE HYDROCHLORIDE 100 MG/1
100 TABLET, FILM COATED ORAL
Refills: 0 | Status: DISCONTINUED | COMMUNITY
End: 2019-07-26

## 2019-07-26 RX ORDER — ACETAMINOPHEN 325 MG/1
TABLET, FILM COATED ORAL
Refills: 0 | Status: DISCONTINUED | COMMUNITY
End: 2019-07-26

## 2019-07-26 RX ORDER — ZIPRASIDONE 20 MG/1
20 CAPSULE ORAL
Refills: 0 | Status: DISCONTINUED | COMMUNITY
End: 2019-07-26

## 2019-07-26 RX ORDER — HALOPERIDOL LACTATE 2 MG/ML
CONCENTRATE, ORAL ORAL
Refills: 0 | Status: DISCONTINUED | COMMUNITY
End: 2019-07-26

## 2019-07-26 RX ORDER — SENNOSIDES 8.6 MG/1
8.6 CAPSULE, GELATIN COATED ORAL
Refills: 0 | Status: DISCONTINUED | COMMUNITY
End: 2019-07-26

## 2019-07-26 RX ORDER — APIXABAN 5 MG/1
5 TABLET, FILM COATED ORAL TWICE DAILY
Refills: 0 | Status: ACTIVE | COMMUNITY

## 2019-07-26 RX ORDER — CALCIUM CARBONATE/VITAMIN D3 500-10/5ML
400 LIQUID (ML) ORAL
Refills: 0 | Status: DISCONTINUED | COMMUNITY
End: 2019-07-26

## 2019-07-26 RX ORDER — CARVEDILOL 3.12 MG/1
3.12 TABLET, FILM COATED ORAL TWICE DAILY
Refills: 0 | Status: ACTIVE | COMMUNITY

## 2019-07-26 NOTE — REVIEW OF SYSTEMS
[Recent Weight Loss (___ Lbs)] : recent [unfilled] ~Ulb weight loss [Polydipsia] : polydipsia [Fatigue] : no fatigue [Blurry Vision] : no blurred vision [Chest Pain] : no chest pain [Palpitations] : no palpitations [Shortness Of Breath] : no shortness of breath [Nausea] : no nausea [Abdominal Pain] : no abdominal pain [Vomiting] : no vomiting was observed [Polyuria] : no polyuria [Pain/Numbness of Digits] : no pain/numbness of digits

## 2019-07-26 NOTE — HISTORY OF PRESENT ILLNESS
[FreeTextEntry1] : Patient presents today for follow-up of diabetes. He has not been seen in this office since May 2017. He lives in a group home ([facility]). Nursing assistant is present at appointment. Reports recent hospitalization at [facility] for pneumonia, then rehab stay. Discharged from rehab .\par \par Type: 2\par Severity: well controlled\par Duration: chronic\par Associated symptoms: hyperglycemia\par Modifying factors: better with insulin\par \par Current meds for glycemic control:\par Levemir 35 units BID\par Humalog sliding scale \par  = 10\par 150-199 = 12\par 200-249 = 14\par 250-300 = 16\par 300-349 = 18\par 350-399 = 20\par 400-500 = 24\par SMBG performed by LPN at group [place]. Now monitoring 3x daily, before each meal (as of yesterday). Prior to that, BG was tested twice daily, before breakfast and before dinner. Was only receiving insulin with breakfast and dinner. Eats breakfast at 7am, but LPN does not arrive until 8am, so has been taking insulin according to BG after the meal.\par Current B, ate cereal and juice for breakfast about 4 hours ago. Was given insulin one hour after the meal.\par \par Complications:\par Last eye exam: none recent; +glaucoma, denies vision changes\par Last foot exam: once per month; denies pain/numbness/tingling in B/L feet\par Kidney disease: none\par \par Lifestyle\par Diet: meals provided at group home\par Weight: loss, 20 pounds while hospitalized for pneumonia\par Exercise: rare

## 2019-07-26 NOTE — ASSESSMENT
[FreeTextEntry1] : 53 year old male with uncontrolled T2DM, hypertension, and hyperlipidemia. Glycemic control is poor due to misuse of insulin.\par \par 1. T2DM\par -Continue Levemir 35 units BID.\par -Take Humalog before breakfast, lunch, and dinner, according to premeal BG/sliding scale.\par -Suggest patient get his breakfast as close to 8am as possible so Humalog can be given prior to eating when the LPN arrive.\par -SMBG 3x daily, before each meal.\par -Written instructions provided.\par -Check A1c now.\par \par 2. Hypertension- controlled.\par -Continue ACE inhibitor.\par \par 3. Hyperlipidemia\par -Continue statin.\par -Check lipids now.

## 2019-07-26 NOTE — PHYSICAL EXAM
[Alert] : alert [Well Nourished] : well nourished [Well Developed] : well developed [No Acute Distress] : no acute distress [EOMI] : extra ocular movement intact [Normal Sclera/Conjunctiva] : normal sclera/conjunctiva [Normal Oropharynx] : the oropharynx was normal [No Proptosis] : no proptosis [Thyroid Not Enlarged] : the thyroid was not enlarged [No Thyroid Nodules] : there were no palpable thyroid nodules [No Accessory Muscle Use] : no accessory muscle use [No Respiratory Distress] : no respiratory distress [Clear to Auscultation] : lungs were clear to auscultation bilaterally [Normal Rate] : heart rate was normal  [Regular Rhythm] : with a regular rhythm [Normal S1, S2] : normal S1 and S2 [No Edema] : there was no peripheral edema [Oriented x3] : oriented to person, place, and time [Normal Mood] : the mood was normal [Normal Affect] : the affect was normal [Acanthosis Nigricans] : no acanthosis nigricans

## 2019-07-29 ENCOUNTER — RESULT REVIEW (OUTPATIENT)
Age: 54
End: 2019-07-29

## 2019-07-29 LAB
ALBUMIN SERPL ELPH-MCNC: 4.2 G/DL
ALP BLD-CCNC: 80 U/L
ALT SERPL-CCNC: 9 U/L
ANION GAP SERPL CALC-SCNC: 12 MMOL/L
AST SERPL-CCNC: 14 U/L
BASOPHILS # BLD AUTO: 0.05 K/UL
BASOPHILS NFR BLD AUTO: 0.9 %
BILIRUB SERPL-MCNC: 0.2 MG/DL
BUN SERPL-MCNC: 15 MG/DL
CALCIUM SERPL-MCNC: 9.8 MG/DL
CHLORIDE SERPL-SCNC: 104 MMOL/L
CHOLEST SERPL-MCNC: 133 MG/DL
CHOLEST/HDLC SERPL: 2.6 RATIO
CO2 SERPL-SCNC: 27 MMOL/L
CREAT SERPL-MCNC: 0.67 MG/DL
CREAT SPEC-SCNC: 142 MG/DL
EOSINOPHIL # BLD AUTO: 0.11 K/UL
EOSINOPHIL NFR BLD AUTO: 2 %
ESTIMATED AVERAGE GLUCOSE: 232 MG/DL
GLUCOSE SERPL-MCNC: 62 MG/DL
HBA1C MFR BLD HPLC: 9.7 %
HCT VFR BLD CALC: 39.8 %
HDLC SERPL-MCNC: 52 MG/DL
HGB BLD-MCNC: 11.2 G/DL
IMM GRANULOCYTES NFR BLD AUTO: 0.2 %
LDLC SERPL CALC-MCNC: 64 MG/DL
LYMPHOCYTES # BLD AUTO: 1.89 K/UL
LYMPHOCYTES NFR BLD AUTO: 34.9 %
MAN DIFF?: NORMAL
MCHC RBC-ENTMCNC: 23.8 PG
MCHC RBC-ENTMCNC: 28.1 GM/DL
MCV RBC AUTO: 84.7 FL
MICROALBUMIN 24H UR DL<=1MG/L-MCNC: <1.2 MG/DL
MICROALBUMIN/CREAT 24H UR-RTO: NORMAL MG/G
MONOCYTES # BLD AUTO: 0.74 K/UL
MONOCYTES NFR BLD AUTO: 13.7 %
NEUTROPHILS # BLD AUTO: 2.61 K/UL
NEUTROPHILS NFR BLD AUTO: 48.3 %
PLATELET # BLD AUTO: 286 K/UL
POTASSIUM SERPL-SCNC: 4.8 MMOL/L
PROT SERPL-MCNC: 6.3 G/DL
RBC # BLD: 4.7 M/UL
RBC # FLD: 18.6 %
SODIUM SERPL-SCNC: 143 MMOL/L
TRIGL SERPL-MCNC: 84 MG/DL
TSH SERPL-ACNC: 0.52 UIU/ML
WBC # FLD AUTO: 5.41 K/UL

## 2019-08-06 ENCOUNTER — OTHER (OUTPATIENT)
Age: 54
End: 2019-08-06

## 2019-08-07 ENCOUNTER — APPOINTMENT (OUTPATIENT)
Dept: PULMONOLOGY | Facility: CLINIC | Age: 54
End: 2019-08-07
Payer: MEDICARE

## 2019-08-07 VITALS
WEIGHT: 167 LBS | DIASTOLIC BLOOD PRESSURE: 82 MMHG | BODY MASS INDEX: 22.13 KG/M2 | SYSTOLIC BLOOD PRESSURE: 128 MMHG | HEIGHT: 73 IN

## 2019-08-07 VITALS — OXYGEN SATURATION: 98 % | HEART RATE: 110 BPM

## 2019-08-07 PROCEDURE — 99215 OFFICE O/P EST HI 40 MIN: CPT | Mod: 25

## 2019-08-07 PROCEDURE — 99406 BEHAV CHNG SMOKING 3-10 MIN: CPT

## 2019-08-07 RX ORDER — OMEPRAZOLE MAGNESIUM 20 MG/1
20 TABLET, DELAYED RELEASE ORAL
Refills: 0 | Status: DISCONTINUED | COMMUNITY
End: 2019-08-07

## 2019-08-07 RX ORDER — TAMSULOSIN HYDROCHLORIDE 0.4 MG/1
0.4 CAPSULE ORAL
Qty: 30 | Refills: 0 | Status: ACTIVE | COMMUNITY
Start: 2019-04-10

## 2019-08-07 RX ORDER — GLUCAGON 1 MG
1 KIT INJECTION
Qty: 1 | Refills: 0 | Status: ACTIVE | COMMUNITY
Start: 2019-04-29

## 2019-08-07 RX ORDER — PEN NEEDLE, DIABETIC 31 GX5/16"
31G X 8 MM NEEDLE, DISPOSABLE MISCELLANEOUS
Qty: 90 | Refills: 0 | Status: ACTIVE | COMMUNITY
Start: 2019-05-13

## 2019-08-07 RX ORDER — MONTELUKAST SODIUM 10 MG/1
10 TABLET, FILM COATED ORAL
Refills: 0 | Status: DISCONTINUED | COMMUNITY
End: 2019-08-07

## 2019-08-07 RX ORDER — THEOPHYLLINE ANHYDROUS 300 MG/1
300 CAPSULE, EXTENDED RELEASE ORAL
Qty: 60 | Refills: 0 | Status: DISCONTINUED | COMMUNITY
Start: 2019-04-10 | End: 2019-08-07

## 2019-08-07 RX ORDER — ROFLUMILAST 500 UG/1
500 TABLET ORAL
Refills: 0 | Status: DISCONTINUED | COMMUNITY
End: 2019-08-07

## 2019-08-07 RX ORDER — OMEPRAZOLE 20 MG/1
20 CAPSULE, DELAYED RELEASE ORAL
Qty: 30 | Refills: 0 | Status: DISCONTINUED | COMMUNITY
Start: 2019-02-10 | End: 2019-08-07

## 2019-08-07 RX ORDER — TAMSULOSIN HCL 0.4 MG
0.4 CAPSULE ORAL
Refills: 0 | Status: DISCONTINUED | COMMUNITY
End: 2019-08-07

## 2019-08-07 RX ORDER — PEN NEEDLE, DIABETIC, SAFETY 31 G X1/4"
31G X 6 MM NEEDLE, DISPOSABLE MISCELLANEOUS
Qty: 100 | Refills: 0 | Status: ACTIVE | COMMUNITY
Start: 2019-05-10

## 2019-08-07 NOTE — REVIEW OF SYSTEMS
[Seizures] : seizures [Depression] : depression [Anxiety] : anxiety [Diabetes] : diabetes mellitus [Fever] : no fever [Chills] : no chills [Dry Eyes] : no dryness of the eyes [Eye Irritation] : no ~T irritation of the eyes [Nasal Congestion] : no nasal congestion [Epistaxis] : no nosebleeds [Postnasal Drip] : no postnasal drip [Sinus Problems] : no sinus problems [Cough] : no cough [Sputum] : not coughing up ~M sputum [Dyspnea] : no dyspnea [Chest Tightness] : no chest tightness [Pleuritic Pain] : no pleuritic pain [Wheezing] : no wheezing [Hypertension] : no ~T hypertension [Chest Discomfort] : no chest discomfort [Dysrhythmia] : no dysrhythmia [Murmurs] : no murmurs were heard [Palpitations] : no palpitations [Edema] : ~T edema was not present [Hay Fever] : no hay fever [Itchy Eyes] : no itching of ~T the eyes [Reflux] : no reflux [Nausea] : no nausea [Vomiting] : no vomiting [Constipation] : no constipation [Diarrhea] : no diarrhea [Abdominal Pain] : no abdominal pain [Dysuria] : no dysuria [Anemia] : no anemia [Headache] : no headache [Dizziness] : no dizziness [Syncope] : no fainting [Numbness] : no numbness [Paralysis] : no paralysis was seen [Thyroid Problem] : no thyroid problem [Snoring] : no snoring [Witnessed Apneas] : demonstrated no ~M apnea [Nonrestorative Sleep] : restorative sleep

## 2019-08-07 NOTE — REASON FOR VISIT
Breathing spontaneous and unlabored. Breath sounds clear and equal bilaterally with regular rhythm. [Abnormal CT Scan] : abnormal CT Scan [COPD] : COPD [Other: _____] : [unfilled] [Follow-Up - From Hospitalization] : a hospitalization follow-up [FreeTextEntry2] : nicotine dependence

## 2019-08-07 NOTE — CONSULT LETTER
[Dear  ___] : Dear  [unfilled], [Consult Letter:] : I had the pleasure of evaluating your patient, [unfilled]. [Please see my note below.] : Please see my note below. [Consult Closing:] : Thank you very much for allowing me to participate in the care of this patient.  If you have any questions, please do not hesitate to contact me. [Sincerely,] : Sincerely, [FreeTextEntry3] : Carlos Gomez MD, FCCP, MARY ELLEN. ABSM\par

## 2019-08-07 NOTE — DISCUSSION/SUMMARY
[FreeTextEntry1] : \par #1. Pt with moderate COPD on spirometry\par #2. Continue Breo and Incruse daily; ProAir as needed\par #3. Albuterol (ProAir) or nebulizer Q6h as needed\par #4. Can consider Daliresp if needed but would d/c theophylline\par #5. Smoking cessation is imperative\par #6. F/u in 3 months for re-evaluation with PFTs\par #7. Control seizures per neurology\par #8. Chest CT from 6/27/19 was with COPD changes but without infiltrates

## 2019-09-06 ENCOUNTER — APPOINTMENT (OUTPATIENT)
Dept: PULMONOLOGY | Facility: CLINIC | Age: 54
End: 2019-09-06
Payer: MEDICARE

## 2019-09-06 VITALS
BODY MASS INDEX: 23.19 KG/M2 | DIASTOLIC BLOOD PRESSURE: 66 MMHG | HEIGHT: 72.75 IN | WEIGHT: 175 LBS | OXYGEN SATURATION: 96 % | HEART RATE: 67 BPM | SYSTOLIC BLOOD PRESSURE: 120 MMHG

## 2019-09-06 PROCEDURE — 99214 OFFICE O/P EST MOD 30 MIN: CPT | Mod: 25

## 2019-09-06 PROCEDURE — 94010 BREATHING CAPACITY TEST: CPT

## 2019-09-06 RX ORDER — FLUTICASONE FUROATE AND VILANTEROL TRIFENATATE 200; 25 UG/1; UG/1
200-25 POWDER RESPIRATORY (INHALATION)
Refills: 0 | Status: DISCONTINUED | COMMUNITY
End: 2019-09-06

## 2019-09-06 RX ORDER — UMECLIDINIUM 62.5 UG/1
62.5 AEROSOL, POWDER ORAL
Refills: 0 | Status: DISCONTINUED | COMMUNITY
End: 2019-09-06

## 2019-09-06 NOTE — REASON FOR VISIT
[Follow-Up] : a follow-up visit [Abnormal CT Scan] : abnormal CT Scan [COPD] : COPD [Other: _____] : [unfilled] [FreeTextEntry2] : nicotine dependence

## 2019-09-06 NOTE — PROCEDURE
[FreeTextEntry1] : PFTs 5/3/19 - Mildly reduced FVC and moderately reduced FEV1 with an obstructive pattern and no significant BD response. Lung volumes were near normal though with evidence of gas trapping given an elevated RV. Diffusion capacity is mildly reduced.\par Spirometry 9/6/19 - Normal FVC an moderately reduced FEV1 with an obstructive pattern; essentially unchanged from previous

## 2019-09-06 NOTE — CONSULT LETTER
[Dear  ___] : Dear  [unfilled], [Please see my note below.] : Please see my note below. [Consult Letter:] : I had the pleasure of evaluating your patient, [unfilled]. [Sincerely,] : Sincerely, [Consult Closing:] : Thank you very much for allowing me to participate in the care of this patient.  If you have any questions, please do not hesitate to contact me. [FreeTextEntry3] : Carlos Gomez MD, FCCP, D. ABSM\par Pulmonary and Sleep Medicine\par [facility] [place]\par \par

## 2019-09-06 NOTE — PHYSICAL EXAM
[Normal Appearance] : normal appearance [General Appearance - Well Developed] : well developed [General Appearance - In No Acute Distress] : no acute distress [Normal Conjunctiva] : the conjunctiva exhibited no abnormalities [Normal Oropharynx] : normal oropharynx [Neck Appearance] : the appearance of the neck was normal [Heart Rate And Rhythm] : heart rate and rhythm were normal [Murmurs] : no murmurs present [Heart Sounds] : normal S1 and S2 [] : no respiratory distress [Edema] : no peripheral edema present [Respiration, Rhythm And Depth] : normal respiratory rhythm and effort [Exaggerated Use Of Accessory Muscles For Inspiration] : no accessory muscle use [Auscultation Breath Sounds / Voice Sounds] : lungs were clear to auscultation bilaterally [Abdomen Soft] : soft [Abdomen Tenderness] : non-tender [Abnormal Walk] : normal gait [Nail Clubbing] : no clubbing of the fingernails [Cyanosis, Localized] : no localized cyanosis [No Focal Deficits] : no focal deficits [Oriented To Time, Place, And Person] : oriented to person, place, and time [FreeTextEntry1] : No abnormalities.

## 2019-09-06 NOTE — DISCUSSION/SUMMARY
[FreeTextEntry1] : \par #1. Pt with moderate COPD on spirometry\par #2. Continue Breo and Incruse daily; ProAir as needed\par #3. Albuterol (ProAir) or nebulizer Q6h as needed\par #4. Can consider Daliresp if needed but would d/c theophylline\par #5. Smoking cessation is imperative\par #6. F/u in 3-4 months for re-evaluation\par #7. Control seizures per neurology\par #8. Chest CT from 6/27/19 was with COPD changes but without infiltrates; will need chest CT for lung cancer screening after age 55\par #9. Pt promises to be off cigarettes by his next visit

## 2019-09-06 NOTE — REVIEW OF SYSTEMS
[Depression] : depression [Seizures] : seizures [Anxiety] : anxiety [Diabetes] : diabetes mellitus [Fever] : no fever [Chills] : no chills [Dry Eyes] : no dryness of the eyes [Eye Irritation] : no ~T irritation of the eyes [Nasal Congestion] : no nasal congestion [Postnasal Drip] : no postnasal drip [Epistaxis] : no nosebleeds [Sinus Problems] : no sinus problems [Cough] : no cough [Sputum] : not coughing up ~M sputum [Chest Tightness] : no chest tightness [Dyspnea] : no dyspnea [Pleuritic Pain] : no pleuritic pain [Wheezing] : no wheezing [Hypertension] : no ~T hypertension [Chest Discomfort] : no chest discomfort [Dysrhythmia] : no dysrhythmia [Murmurs] : no murmurs were heard [Palpitations] : no palpitations [Edema] : ~T edema was not present [Hay Fever] : no hay fever [Itchy Eyes] : no itching of ~T the eyes [Reflux] : no reflux [Nausea] : no nausea [Vomiting] : no vomiting [Constipation] : no constipation [Diarrhea] : no diarrhea [Abdominal Pain] : no abdominal pain [Dysuria] : no dysuria [Anemia] : no anemia [Headache] : no headache [Dizziness] : no dizziness [Syncope] : no fainting [Numbness] : no numbness [Paralysis] : no paralysis was seen [Thyroid Problem] : no thyroid problem [Snoring] : no snoring [Witnessed Apneas] : demonstrated no ~M apnea [Nonrestorative Sleep] : restorative sleep

## 2019-09-06 NOTE — HISTORY OF PRESENT ILLNESS
[Dyspnea on Exertion] : dyspnea on exertion [Follow-Up - Routine Clinic] : a routine clinic follow-up of [Currently Experiencing] : The patient is currently experiencing symptoms. [None] : No associated symptoms are reported [Short-Acting Beta Agonists] : short-acting beta agonists [Long-Acting Beta Agonists] : long-acting beta agonists [Inhaled Corticosteroids] : inhaled corticosteroids [Good Compliance] : good compliance with treatment [Good Tolerance] : good tolerance of treatment [Good Symptom Control] : good symptom control [FreeTextEntry1] : The patient was recently (2-3/2019) admitted to [facility] ICU after being intubated for seizure disorder. Prior to that, he appeared to have a pneumonia which subsequently resolved. He was previously in [facility] for a COPD exacerbation one year ago (5/2018). \par He is a current smoker of up to 2.5 ppd x 18 years though is now down to <1 ppd. I spent > 3 min discussing the risks of smoking and the benefits and therapy for smoking cessation including nicotine replacement, medications, and programs.\par He denies sleep complaints.\par Patient hospitalized in June 2019 for cough, SOB and was found to have ARF and sepsis. He is now improved and back to baseline. [Wheezing] : no wheezing [Non-Productive Cough] : no non-productive cough

## 2019-09-11 ENCOUNTER — APPOINTMENT (OUTPATIENT)
Dept: ENDOCRINOLOGY | Facility: CLINIC | Age: 54
End: 2019-09-11

## 2019-10-12 ENCOUNTER — EMERGENCY (EMERGENCY)
Facility: HOSPITAL | Age: 54
LOS: 1 days | Discharge: HOME | End: 2019-10-12
Attending: EMERGENCY MEDICINE
Payer: MEDICARE

## 2019-10-12 VITALS
TEMPERATURE: 99 F | RESPIRATION RATE: 18 BRPM | DIASTOLIC BLOOD PRESSURE: 71 MMHG | OXYGEN SATURATION: 92 % | SYSTOLIC BLOOD PRESSURE: 107 MMHG | HEART RATE: 101 BPM

## 2019-10-12 DIAGNOSIS — F20.9 SCHIZOPHRENIA, UNSPECIFIED: ICD-10-CM

## 2019-10-12 LAB
ANION GAP SERPL CALC-SCNC: 11 MMOL/L — SIGNIFICANT CHANGE UP (ref 5–17)
APAP SERPL-MCNC: <7.5 UG/ML — LOW (ref 10–26)
APPEARANCE UR: CLEAR — SIGNIFICANT CHANGE UP
BASOPHILS # BLD AUTO: 0 K/UL — SIGNIFICANT CHANGE UP (ref 0–0.2)
BASOPHILS NFR BLD AUTO: 0 % — SIGNIFICANT CHANGE UP (ref 0–2)
BILIRUB UR-MCNC: NEGATIVE — SIGNIFICANT CHANGE UP
BUN SERPL-MCNC: 10 MG/DL — SIGNIFICANT CHANGE UP (ref 8–20)
CALCIUM SERPL-MCNC: 9 MG/DL — SIGNIFICANT CHANGE UP (ref 8.6–10.2)
CHLORIDE SERPL-SCNC: 90 MMOL/L — LOW (ref 98–107)
CO2 SERPL-SCNC: 29 MMOL/L — SIGNIFICANT CHANGE UP (ref 22–29)
COLOR SPEC: YELLOW — SIGNIFICANT CHANGE UP
CREAT SERPL-MCNC: 0.71 MG/DL — SIGNIFICANT CHANGE UP (ref 0.5–1.3)
DIFF PNL FLD: ABNORMAL
EOSINOPHIL # BLD AUTO: 0.06 K/UL — SIGNIFICANT CHANGE UP (ref 0–0.5)
EOSINOPHIL NFR BLD AUTO: 0.9 % — SIGNIFICANT CHANGE UP (ref 0–6)
ETHANOL SERPL-MCNC: <10 MG/DL — SIGNIFICANT CHANGE UP
GLUCOSE SERPL-MCNC: 464 MG/DL — HIGH (ref 70–115)
GLUCOSE UR QL: 1000 MG/DL
HCT VFR BLD CALC: 42.7 % — SIGNIFICANT CHANGE UP (ref 39–50)
HGB BLD-MCNC: 13.5 G/DL — SIGNIFICANT CHANGE UP (ref 13–17)
KETONES UR-MCNC: NEGATIVE — SIGNIFICANT CHANGE UP
LEUKOCYTE ESTERASE UR-ACNC: ABNORMAL
LYMPHOCYTES # BLD AUTO: 0.99 K/UL — LOW (ref 1–3.3)
LYMPHOCYTES # BLD AUTO: 13.9 % — SIGNIFICANT CHANGE UP (ref 13–44)
MCHC RBC-ENTMCNC: 27 PG — SIGNIFICANT CHANGE UP (ref 27–34)
MCHC RBC-ENTMCNC: 31.6 GM/DL — LOW (ref 32–36)
MCV RBC AUTO: 85.4 FL — SIGNIFICANT CHANGE UP (ref 80–100)
MONOCYTES # BLD AUTO: 1.05 K/UL — HIGH (ref 0–0.9)
MONOCYTES NFR BLD AUTO: 14.8 % — HIGH (ref 2–14)
NEUTROPHILS # BLD AUTO: 4.99 K/UL — SIGNIFICANT CHANGE UP (ref 1.8–7.4)
NEUTROPHILS NFR BLD AUTO: 70.4 % — SIGNIFICANT CHANGE UP (ref 43–77)
NITRITE UR-MCNC: NEGATIVE — SIGNIFICANT CHANGE UP
PCP SPEC-MCNC: SIGNIFICANT CHANGE UP
PH UR: 7 — SIGNIFICANT CHANGE UP (ref 5–8)
PLATELET # BLD AUTO: 187 K/UL — SIGNIFICANT CHANGE UP (ref 150–400)
POTASSIUM SERPL-MCNC: 4.2 MMOL/L — SIGNIFICANT CHANGE UP (ref 3.5–5.3)
POTASSIUM SERPL-SCNC: 4.2 MMOL/L — SIGNIFICANT CHANGE UP (ref 3.5–5.3)
PROT UR-MCNC: 15 MG/DL
RBC # BLD: 5 M/UL — SIGNIFICANT CHANGE UP (ref 4.2–5.8)
RBC # FLD: 21.2 % — HIGH (ref 10.3–14.5)
SODIUM SERPL-SCNC: 130 MMOL/L — LOW (ref 135–145)
SP GR SPEC: 1 — LOW (ref 1.01–1.02)
UROBILINOGEN FLD QL: NEGATIVE MG/DL — SIGNIFICANT CHANGE UP
WBC # BLD: 7.09 K/UL — SIGNIFICANT CHANGE UP (ref 3.8–10.5)
WBC # FLD AUTO: 7.09 K/UL — SIGNIFICANT CHANGE UP (ref 3.8–10.5)

## 2019-10-12 PROCEDURE — 99284 EMERGENCY DEPT VISIT MOD MDM: CPT

## 2019-10-12 PROCEDURE — 93010 ELECTROCARDIOGRAM REPORT: CPT

## 2019-10-12 PROCEDURE — 90792 PSYCH DIAG EVAL W/MED SRVCS: CPT

## 2019-10-12 RX ORDER — DOCUSATE SODIUM 100 MG
100 CAPSULE ORAL THREE TIMES A DAY
Refills: 0 | Status: DISCONTINUED | OUTPATIENT
Start: 2019-10-12 | End: 2019-10-19

## 2019-10-12 RX ORDER — INSULIN LISPRO 100/ML
VIAL (ML) SUBCUTANEOUS
Refills: 0 | Status: DISCONTINUED | OUTPATIENT
Start: 2019-10-12 | End: 2019-10-19

## 2019-10-12 RX ORDER — FLUTICASONE PROPIONATE 220 MCG
1 AEROSOL WITH ADAPTER (GRAM) INHALATION EVERY 12 HOURS
Refills: 0 | Status: DISCONTINUED | OUTPATIENT
Start: 2019-10-12 | End: 2019-10-19

## 2019-10-12 RX ORDER — DEXTROSE 50 % IN WATER 50 %
25 SYRINGE (ML) INTRAVENOUS ONCE
Refills: 0 | Status: DISCONTINUED | OUTPATIENT
Start: 2019-10-12 | End: 2019-10-19

## 2019-10-12 RX ORDER — SUCRALFATE 1 G
1 TABLET ORAL
Qty: 0 | Refills: 0 | DISCHARGE

## 2019-10-12 RX ORDER — QUETIAPINE FUMARATE 200 MG/1
1 TABLET, FILM COATED ORAL
Qty: 0 | Refills: 0 | DISCHARGE

## 2019-10-12 RX ORDER — SIMVASTATIN 20 MG/1
20 TABLET, FILM COATED ORAL DAILY
Refills: 0 | Status: DISCONTINUED | OUTPATIENT
Start: 2019-10-12 | End: 2019-10-19

## 2019-10-12 RX ORDER — MONTELUKAST 4 MG/1
10 TABLET, CHEWABLE ORAL ONCE
Refills: 0 | Status: COMPLETED | OUTPATIENT
Start: 2019-10-12 | End: 2019-10-13

## 2019-10-12 RX ORDER — TAMSULOSIN HYDROCHLORIDE 0.4 MG/1
1 CAPSULE ORAL
Qty: 0 | Refills: 0 | DISCHARGE

## 2019-10-12 RX ORDER — ESOMEPRAZOLE MAGNESIUM 40 MG/1
1 CAPSULE, DELAYED RELEASE ORAL
Qty: 0 | Refills: 0 | DISCHARGE

## 2019-10-12 RX ORDER — LISINOPRIL 2.5 MG/1
2.5 TABLET ORAL DAILY
Refills: 0 | Status: DISCONTINUED | OUTPATIENT
Start: 2019-10-12 | End: 2019-10-19

## 2019-10-12 RX ORDER — DEXTROSE 50 % IN WATER 50 %
12.5 SYRINGE (ML) INTRAVENOUS ONCE
Refills: 0 | Status: DISCONTINUED | OUTPATIENT
Start: 2019-10-12 | End: 2019-10-19

## 2019-10-12 RX ORDER — HALOPERIDOL DECANOATE 100 MG/ML
10 INJECTION INTRAMUSCULAR AT BEDTIME
Refills: 0 | Status: DISCONTINUED | OUTPATIENT
Start: 2019-10-12 | End: 2019-10-19

## 2019-10-12 RX ORDER — APIXABAN 2.5 MG/1
5 TABLET, FILM COATED ORAL
Refills: 0 | Status: DISCONTINUED | OUTPATIENT
Start: 2019-10-12 | End: 2019-10-19

## 2019-10-12 RX ORDER — TIOTROPIUM BROMIDE AND OLODATEROL 3.124; 2.736 UG/1; UG/1
2 SPRAY, METERED RESPIRATORY (INHALATION) DAILY
Refills: 0 | Status: DISCONTINUED | OUTPATIENT
Start: 2019-10-12 | End: 2019-10-19

## 2019-10-12 RX ORDER — UMECLIDINIUM 62.5 UG/1
1 AEROSOL, POWDER ORAL
Qty: 0 | Refills: 0 | DISCHARGE

## 2019-10-12 RX ORDER — CLONAZEPAM 1 MG
1 TABLET ORAL
Qty: 0 | Refills: 0 | DISCHARGE

## 2019-10-12 RX ORDER — SIMVASTATIN 20 MG/1
1 TABLET, FILM COATED ORAL
Qty: 0 | Refills: 0 | DISCHARGE

## 2019-10-12 RX ORDER — ROFLUMILAST 500 UG/1
1 TABLET ORAL
Qty: 0 | Refills: 0 | DISCHARGE

## 2019-10-12 RX ORDER — CARVEDILOL PHOSPHATE 80 MG/1
1 CAPSULE, EXTENDED RELEASE ORAL
Qty: 0 | Refills: 0 | DISCHARGE

## 2019-10-12 RX ORDER — CLOPIDOGREL BISULFATE 75 MG/1
75 TABLET, FILM COATED ORAL ONCE
Refills: 0 | Status: COMPLETED | OUTPATIENT
Start: 2019-10-12 | End: 2019-10-13

## 2019-10-12 RX ORDER — APIXABAN 2.5 MG/1
1 TABLET, FILM COATED ORAL
Qty: 0 | Refills: 0 | DISCHARGE

## 2019-10-12 RX ORDER — GLUCAGON INJECTION, SOLUTION 0.5 MG/.1ML
1 INJECTION, SOLUTION SUBCUTANEOUS ONCE
Refills: 0 | Status: DISCONTINUED | OUTPATIENT
Start: 2019-10-12 | End: 2019-10-19

## 2019-10-12 RX ORDER — PANTOPRAZOLE SODIUM 20 MG/1
40 TABLET, DELAYED RELEASE ORAL
Refills: 0 | Status: DISCONTINUED | OUTPATIENT
Start: 2019-10-12 | End: 2019-10-19

## 2019-10-12 RX ORDER — FLUTICASONE PROPIONATE 220 MCG
1 AEROSOL WITH ADAPTER (GRAM) INHALATION ONCE
Refills: 0 | Status: DISCONTINUED | OUTPATIENT
Start: 2019-10-12 | End: 2019-10-12

## 2019-10-12 RX ORDER — SERTRALINE 25 MG/1
100 TABLET, FILM COATED ORAL DAILY
Refills: 0 | Status: DISCONTINUED | OUTPATIENT
Start: 2019-10-12 | End: 2019-10-19

## 2019-10-12 RX ORDER — TAMSULOSIN HYDROCHLORIDE 0.4 MG/1
0.4 CAPSULE ORAL AT BEDTIME
Refills: 0 | Status: DISCONTINUED | OUTPATIENT
Start: 2019-10-12 | End: 2019-10-19

## 2019-10-12 RX ORDER — LEVETIRACETAM 250 MG/1
1000 TABLET, FILM COATED ORAL
Refills: 0 | Status: DISCONTINUED | OUTPATIENT
Start: 2019-10-12 | End: 2019-10-19

## 2019-10-12 RX ORDER — CARVEDILOL PHOSPHATE 80 MG/1
3.12 CAPSULE, EXTENDED RELEASE ORAL EVERY 12 HOURS
Refills: 0 | Status: DISCONTINUED | OUTPATIENT
Start: 2019-10-12 | End: 2019-10-19

## 2019-10-12 RX ORDER — ROFLUMILAST 500 UG/1
500 TABLET ORAL DAILY
Refills: 0 | Status: DISCONTINUED | OUTPATIENT
Start: 2019-10-12 | End: 2019-10-19

## 2019-10-12 RX ORDER — LEVETIRACETAM 250 MG/1
1 TABLET, FILM COATED ORAL
Qty: 0 | Refills: 0 | DISCHARGE

## 2019-10-12 RX ORDER — LANOLIN ALCOHOL/MO/W.PET/CERES
2 CREAM (GRAM) TOPICAL
Qty: 0 | Refills: 0 | DISCHARGE

## 2019-10-12 RX ORDER — CLOPIDOGREL BISULFATE 75 MG/1
1 TABLET, FILM COATED ORAL
Qty: 0 | Refills: 0 | DISCHARGE

## 2019-10-12 RX ORDER — INSULIN DETEMIR 100/ML (3)
30 INSULIN PEN (ML) SUBCUTANEOUS
Qty: 0 | Refills: 0 | DISCHARGE

## 2019-10-12 RX ORDER — ASPIRIN/CALCIUM CARB/MAGNESIUM 324 MG
81 TABLET ORAL DAILY
Refills: 0 | Status: DISCONTINUED | OUTPATIENT
Start: 2019-10-12 | End: 2019-10-19

## 2019-10-12 RX ORDER — THEOPHYLLINE ANHYDROUS 200 MG
600 TABLET, EXTENDED RELEASE 12 HR ORAL DAILY
Refills: 0 | Status: DISCONTINUED | OUTPATIENT
Start: 2019-10-12 | End: 2019-10-19

## 2019-10-12 RX ORDER — FERROUS SULFATE 325(65) MG
300 TABLET ORAL EVERY 12 HOURS
Refills: 0 | Status: DISCONTINUED | OUTPATIENT
Start: 2019-10-12 | End: 2019-10-19

## 2019-10-12 RX ORDER — SERTRALINE 25 MG/1
1 TABLET, FILM COATED ORAL
Qty: 0 | Refills: 0 | DISCHARGE

## 2019-10-12 RX ORDER — INSULIN DETEMIR 100/ML (3)
30 INSULIN PEN (ML) SUBCUTANEOUS
Refills: 0 | Status: DISCONTINUED | OUTPATIENT
Start: 2019-10-12 | End: 2019-10-19

## 2019-10-12 RX ORDER — QUETIAPINE FUMARATE 200 MG/1
300 TABLET, FILM COATED ORAL ONCE
Refills: 0 | Status: COMPLETED | OUTPATIENT
Start: 2019-10-12 | End: 2019-10-12

## 2019-10-12 RX ORDER — LATANOPROST 0.05 MG/ML
1 SOLUTION/ DROPS OPHTHALMIC; TOPICAL
Qty: 0 | Refills: 0 | DISCHARGE

## 2019-10-12 RX ORDER — LISINOPRIL 2.5 MG/1
1 TABLET ORAL
Qty: 0 | Refills: 0 | DISCHARGE

## 2019-10-12 RX ORDER — MONTELUKAST 4 MG/1
1 TABLET, CHEWABLE ORAL
Qty: 0 | Refills: 0 | DISCHARGE

## 2019-10-12 RX ORDER — ZIPRASIDONE HYDROCHLORIDE 20 MG/1
40 CAPSULE ORAL
Refills: 0 | Status: DISCONTINUED | OUTPATIENT
Start: 2019-10-12 | End: 2019-10-19

## 2019-10-12 RX ORDER — BENZTROPINE MESYLATE 1 MG
1 TABLET ORAL
Refills: 0 | Status: DISCONTINUED | OUTPATIENT
Start: 2019-10-12 | End: 2019-10-19

## 2019-10-12 RX ORDER — IPRATROPIUM/ALBUTEROL SULFATE 18-103MCG
3 AEROSOL WITH ADAPTER (GRAM) INHALATION
Refills: 0 | Status: DISCONTINUED | OUTPATIENT
Start: 2019-10-12 | End: 2019-10-19

## 2019-10-12 RX ORDER — TRAVOPROST 0.04 MG/ML
2 SOLUTION/ DROPS OPHTHALMIC
Qty: 0 | Refills: 0 | DISCHARGE

## 2019-10-12 RX ORDER — LANOLIN ALCOHOL/MO/W.PET/CERES
5 CREAM (GRAM) TOPICAL AT BEDTIME
Refills: 0 | Status: DISCONTINUED | OUTPATIENT
Start: 2019-10-12 | End: 2019-10-19

## 2019-10-12 RX ORDER — FLUTICASONE FUROATE AND VILANTEROL TRIFENATATE 100; 25 UG/1; UG/1
1 POWDER RESPIRATORY (INHALATION)
Qty: 0 | Refills: 0 | DISCHARGE

## 2019-10-12 RX ORDER — SODIUM CHLORIDE 9 MG/ML
1000 INJECTION, SOLUTION INTRAVENOUS
Refills: 0 | Status: DISCONTINUED | OUTPATIENT
Start: 2019-10-12 | End: 2019-10-19

## 2019-10-12 RX ORDER — INSULIN LISPRO 100/ML
8 VIAL (ML) SUBCUTANEOUS ONCE
Refills: 0 | Status: COMPLETED | OUTPATIENT
Start: 2019-10-12 | End: 2019-10-12

## 2019-10-12 RX ORDER — DEXTROSE 50 % IN WATER 50 %
15 SYRINGE (ML) INTRAVENOUS ONCE
Refills: 0 | Status: DISCONTINUED | OUTPATIENT
Start: 2019-10-12 | End: 2019-10-19

## 2019-10-12 RX ADMIN — Medication 5 MILLIGRAM(S): at 22:08

## 2019-10-12 RX ADMIN — Medication 2: at 17:32

## 2019-10-12 RX ADMIN — Medication 8 UNIT(S): at 15:34

## 2019-10-12 RX ADMIN — Medication 1 PUFF(S): at 20:39

## 2019-10-12 RX ADMIN — Medication 1 MILLIGRAM(S): at 17:31

## 2019-10-12 RX ADMIN — Medication 30 UNIT(S): at 17:33

## 2019-10-12 RX ADMIN — QUETIAPINE FUMARATE 300 MILLIGRAM(S): 200 TABLET, FILM COATED ORAL at 22:08

## 2019-10-12 RX ADMIN — CARVEDILOL PHOSPHATE 3.12 MILLIGRAM(S): 80 CAPSULE, EXTENDED RELEASE ORAL at 22:08

## 2019-10-12 RX ADMIN — TAMSULOSIN HYDROCHLORIDE 0.4 MILLIGRAM(S): 0.4 CAPSULE ORAL at 22:08

## 2019-10-12 RX ADMIN — HALOPERIDOL DECANOATE 10 MILLIGRAM(S): 100 INJECTION INTRAMUSCULAR at 22:08

## 2019-10-12 RX ADMIN — APIXABAN 5 MILLIGRAM(S): 2.5 TABLET, FILM COATED ORAL at 17:32

## 2019-10-12 RX ADMIN — ZIPRASIDONE HYDROCHLORIDE 40 MILLIGRAM(S): 20 CAPSULE ORAL at 17:51

## 2019-10-12 RX ADMIN — Medication 300 MILLIGRAM(S): at 22:07

## 2019-10-12 RX ADMIN — LEVETIRACETAM 1000 MILLIGRAM(S): 250 TABLET, FILM COATED ORAL at 22:08

## 2019-10-12 RX ADMIN — Medication 100 MILLIGRAM(S): at 22:08

## 2019-10-12 RX ADMIN — Medication 100 MILLIGRAM(S): at 17:31

## 2019-10-12 NOTE — ED BEHAVIORAL HEALTH ASSESSMENT NOTE - RISK ASSESSMENT
Chronic risk due to hx of schizophrenia, age and status as  male. Acute risk due to recent agitation irritability and acute medical issue, questionable med compliance, no recent psychiatric hospitalizations, No prior suicide attempt, denies any current or past  suicidal plan or intent, denies homicidal ideation.  Pt has increasing agitation and aggression and requires further monitoring of behavior for safety /risk assessment.
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
## 2019-10-12 NOTE — ED BEHAVIORAL HEALTH ASSESSMENT NOTE - SUICIDE RISK FACTORS
Current mood episode/Psychotic disorder current/past/Mood Disorder current/past/Agitation/Severe Anxiety/Panic
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
## 2019-10-12 NOTE — ED BEHAVIORAL HEALTH ASSESSMENT NOTE - VIOLENCE RISK FACTORS:
Irritability/Violent ideation/threat/speech/Affective dysregulation/Lack of insight into violence risk/need for treatment/Feeling of being under threat and being unable to control threat
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
## 2019-10-12 NOTE — ED PROVIDER NOTE - SKIN, MLM
Skin normal color for race, warm, dry and intact. No evidence of rash. No bruising/ abrasions to R thigh.
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
## 2019-10-12 NOTE — ED BEHAVIORAL HEALTH ASSESSMENT NOTE - CURRENT MEDICATION
ASA 81 qd, BreoElipta 20/25 1 puff qd, Cogentin 1 mg bid, Coreg 3.125 bid, Colace 100 tid, Daliresp 500 qd, Eliquis 5 mg bid, Ferrous Sulfate 325 bid, Haldol 10 mg hs, Humalog sliding scale (See orders), Incruse Ellipta 62.5 mcg 1 puff daily, Keppra 1000 bid am/hs, Levemir 30 u bid am/ dinner, Lisinopril 2.5 mg qd, Melatonin 5mg hs, Nexium 20mg qd, Sucralfate 1Gm qid, Theophyline 300 2 tabs  tab, Travatan 0.4% 2 gtts qd, Geodan 20 mg bid, Singulair 10 mg qd, Flomax .o4 qd, Duonab qid, Seroquel 300 hs, Zocor 20 qam, Zoloft 100 qd
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
## 2019-10-12 NOTE — ED PROVIDER NOTE - CARE PLAN
Principal Discharge DX:	Aggressive behavior of adult Principal Discharge DX:	UTI (urinary tract infection)

## 2019-10-12 NOTE — ED PROVIDER NOTE - PATIENT PORTAL LINK FT
You can access the FollowMyHealth Patient Portal offered by [facility] by registering at the following website: [email or web address]. By joining [employer]’s FollowMyHealth portal, you will also be able to view your health information using other applications (apps) compatible with our system.

## 2019-10-12 NOTE — ED BEHAVIORAL HEALTH ASSESSMENT NOTE - DESCRIPTION
Isolative, remains in room with lights out watching TV IDDM; HLD on Eliquis and Plavix, COPD currently living in adult home.  Pt has a sister he is in contact with

## 2019-10-13 VITALS
RESPIRATION RATE: 20 BRPM | HEART RATE: 78 BPM | TEMPERATURE: 99 F | DIASTOLIC BLOOD PRESSURE: 62 MMHG | SYSTOLIC BLOOD PRESSURE: 96 MMHG | OXYGEN SATURATION: 96 %

## 2019-10-13 LAB — HBA1C BLD-MCNC: 7.5 % — HIGH (ref 4–5.6)

## 2019-10-13 PROCEDURE — 93005 ELECTROCARDIOGRAM TRACING: CPT

## 2019-10-13 PROCEDURE — 80048 BASIC METABOLIC PNL TOTAL CA: CPT

## 2019-10-13 PROCEDURE — 85027 COMPLETE CBC AUTOMATED: CPT

## 2019-10-13 PROCEDURE — 80307 DRUG TEST PRSMV CHEM ANLYZR: CPT

## 2019-10-13 PROCEDURE — 36415 COLL VENOUS BLD VENIPUNCTURE: CPT

## 2019-10-13 PROCEDURE — 94640 AIRWAY INHALATION TREATMENT: CPT

## 2019-10-13 PROCEDURE — 99211 OFF/OP EST MAY X REQ PHY/QHP: CPT

## 2019-10-13 PROCEDURE — 81001 URINALYSIS AUTO W/SCOPE: CPT

## 2019-10-13 PROCEDURE — 99284 EMERGENCY DEPT VISIT MOD MDM: CPT | Mod: 25

## 2019-10-13 PROCEDURE — 83036 HEMOGLOBIN GLYCOSYLATED A1C: CPT

## 2019-10-13 RX ORDER — CEPHALEXIN 500 MG
1 CAPSULE ORAL
Qty: 20 | Refills: 0
Start: 2019-10-13 | End: 2019-10-22

## 2019-10-13 RX ADMIN — Medication 600 MILLIGRAM(S): at 06:23

## 2019-10-13 RX ADMIN — Medication 300 MILLIGRAM(S): at 06:08

## 2019-10-13 RX ADMIN — Medication 30 UNIT(S): at 08:15

## 2019-10-13 RX ADMIN — LISINOPRIL 2.5 MILLIGRAM(S): 2.5 TABLET ORAL at 06:08

## 2019-10-13 RX ADMIN — LISINOPRIL 2.5 MILLIGRAM(S): 2.5 TABLET ORAL at 06:23

## 2019-10-13 RX ADMIN — CLOPIDOGREL BISULFATE 75 MILLIGRAM(S): 75 TABLET, FILM COATED ORAL at 06:08

## 2019-10-13 RX ADMIN — MONTELUKAST 10 MILLIGRAM(S): 4 TABLET, CHEWABLE ORAL at 06:05

## 2019-10-13 RX ADMIN — CARVEDILOL PHOSPHATE 3.12 MILLIGRAM(S): 80 CAPSULE, EXTENDED RELEASE ORAL at 06:05

## 2019-10-13 RX ADMIN — LEVETIRACETAM 1000 MILLIGRAM(S): 250 TABLET, FILM COATED ORAL at 06:06

## 2019-10-13 RX ADMIN — Medication 1 MILLIGRAM(S): at 06:06

## 2019-10-13 RX ADMIN — Medication 100 MILLIGRAM(S): at 06:05

## 2019-10-13 RX ADMIN — Medication 2: at 08:14

## 2019-10-13 RX ADMIN — ZIPRASIDONE HYDROCHLORIDE 40 MILLIGRAM(S): 20 CAPSULE ORAL at 06:08

## 2019-10-13 RX ADMIN — Medication 2: at 12:19

## 2019-10-13 RX ADMIN — APIXABAN 5 MILLIGRAM(S): 2.5 TABLET, FILM COATED ORAL at 06:06

## 2019-10-13 RX ADMIN — ROFLUMILAST 500 MICROGRAM(S): 500 TABLET ORAL at 11:43

## 2019-10-13 RX ADMIN — Medication 81 MILLIGRAM(S): at 06:08

## 2019-10-13 RX ADMIN — PANTOPRAZOLE SODIUM 40 MILLIGRAM(S): 20 TABLET, DELAYED RELEASE ORAL at 06:09

## 2019-10-13 RX ADMIN — SIMVASTATIN 20 MILLIGRAM(S): 20 TABLET, FILM COATED ORAL at 06:06

## 2019-10-13 RX ADMIN — SERTRALINE 100 MILLIGRAM(S): 25 TABLET, FILM COATED ORAL at 06:06

## 2019-10-13 NOTE — CHART NOTE - NSCHARTNOTEFT_GEN_A_CORE
As per LOWELL Meredith, patient is recommended to discharge to back to [facility]. SW placed call to [facility] and spoke with Moe whom reported patient's Medicaid benefit is through elder-serve managed care. SW placed call to [facility] and spoke with nurse Sameera Maxwell ([phone]). Sameera confirmed patient's authorization number: [id number] and let SW know to contact [facility] as patient's insurance will cover the transportation. SW placed call to [facility] ([phone]) and arranged transportation for patient at 12:30pm. SW made patient, NP Masoud, patient's nurse, and patient aware of discharge time. Confirmation: [id number]. SW gave transportation form to patient's nurse prior to discharge. No further SW needs indicated.

## 2019-10-13 NOTE — ED ADULT NURSE REASSESSMENT NOTE - NS ED NURSE REASSESS COMMENT FT1
Dr. Le made aware of repeat accu-check 86  dinner tray and cookies and OJ given.  will repeat accucheck in 30 mins
am meds given patient refusing Neb treatment. HA1C drawn and sent to lab
repeat accucheck 133 Dr. Le made aware. patient nad not4d resting at interval. v/s taken and charted
HS accucheck 47 cookies and orange given. Dr. Le made aware and will repeat accucheck in 30 mins. m patient refused 2200 Duoneb treatment. patient with cough no secretions expectorated
Assumed care of patient at 0720.  Patient resting in bed asleep with no distress noted.  Safety of patient maintained.
POC glucose is 203mg/dl at 0800 prior to breakfast and accepted 2 units Humalog insulin coverage.  Patient assessed by respiratory therapist and refused Duoneb and Stiolto as ordered.  Patient denies difficulty breathing.  Safety of patient maintained.
Patient received 2 units of Humalog insulin coverage before dinner for POC glucose of 232.  Patient ate 75% of his dinner.  Patient educated about plan of care and remaining IN  till reassessment in AM.  Patient verbalized understanding of plan and expressed desire to return home after reassessment.  Patient denies suicidal or homicidal ideations.  Patient maintaining behavioral control.  Safety of patient maintained.
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
## 2019-11-12 ENCOUNTER — APPOINTMENT (OUTPATIENT)
Dept: PULMONOLOGY | Facility: CLINIC | Age: 54
End: 2019-11-12
Payer: MEDICARE

## 2019-11-12 VITALS
WEIGHT: 174 LBS | OXYGEN SATURATION: 94 % | HEIGHT: 72 IN | BODY MASS INDEX: 23.57 KG/M2 | HEART RATE: 90 BPM | RESPIRATION RATE: 16 BRPM

## 2019-11-12 VITALS — DIASTOLIC BLOOD PRESSURE: 70 MMHG | SYSTOLIC BLOOD PRESSURE: 108 MMHG

## 2019-11-12 DIAGNOSIS — Z87.891 PERSONAL HISTORY OF NICOTINE DEPENDENCE: ICD-10-CM

## 2019-11-12 DIAGNOSIS — R06.02 SHORTNESS OF BREATH: ICD-10-CM

## 2019-11-12 DIAGNOSIS — J44.9 CHRONIC OBSTRUCTIVE PULMONARY DISEASE, UNSPECIFIED: ICD-10-CM

## 2019-11-12 PROCEDURE — 94060 EVALUATION OF WHEEZING: CPT

## 2019-11-12 PROCEDURE — 99214 OFFICE O/P EST MOD 30 MIN: CPT | Mod: 25

## 2019-11-12 PROCEDURE — 94664 DEMO&/EVAL PT USE INHALER: CPT | Mod: 59

## 2019-11-12 RX ORDER — CEPHALEXIN 500 MG/1
500 CAPSULE ORAL
Qty: 20 | Refills: 0 | Status: DISCONTINUED | COMMUNITY
Start: 2019-10-13

## 2019-11-12 RX ORDER — SYRINGE,NEEDLE,INSULN,SAFE,1ML 29 G X1/2"
29G X 1/2" SYRINGE, EMPTY DISPOSABLE MISCELLANEOUS
Qty: 100 | Refills: 0 | Status: ACTIVE | COMMUNITY
Start: 2019-07-18

## 2019-11-12 RX ORDER — PEN NEEDLE, DIABETIC 32GX 5/32"
70 NEEDLE, DISPOSABLE MISCELLANEOUS
Qty: 100 | Refills: 0 | Status: ACTIVE | COMMUNITY
Start: 2019-08-30

## 2019-11-12 RX ORDER — TRAVOPROST 0.04 MG/ML
0 SOLUTION/ DROPS OPHTHALMIC
Qty: 5 | Refills: 0 | Status: ACTIVE | COMMUNITY
Start: 2019-06-14

## 2019-11-12 RX ORDER — FLUTICASONE FUROATE AND VILANTEROL TRIFENATATE 100; 25 UG/1; UG/1
100-25 POWDER RESPIRATORY (INHALATION) DAILY
Qty: 1 | Refills: 5 | Status: ACTIVE | COMMUNITY
Start: 2019-08-07 | End: 1900-01-01

## 2019-11-12 RX ORDER — GLUCAGON HYDROCHLORIDE 1 MG
1 KIT INJECTION
Qty: 1 | Refills: 0 | Status: ACTIVE | COMMUNITY
Start: 2019-11-06

## 2019-11-12 RX ORDER — AZITHROMYCIN 500 MG/1
500 TABLET, FILM COATED ORAL
Qty: 21 | Refills: 0 | Status: DISCONTINUED | COMMUNITY
Start: 2019-06-26

## 2019-11-12 RX ORDER — PREDNISONE 10 MG/1
10 TABLET ORAL
Qty: 30 | Refills: 0 | Status: DISCONTINUED | COMMUNITY
Start: 2019-06-26

## 2019-11-12 NOTE — HISTORY OF PRESENT ILLNESS
[Dyspnea on Exertion] : dyspnea on exertion [Follow-Up - Routine Clinic] : a routine clinic follow-up of [Currently Experiencing] : The patient is currently experiencing symptoms. [None] : No associated symptoms are reported [Short-Acting Beta Agonists] : short-acting beta agonists [Long-Acting Beta Agonists] : long-acting beta agonists [Inhaled Corticosteroids] : inhaled corticosteroids [Good Tolerance] : good tolerance of treatment [FreeTextEntry1] : The patient was recently (2-3/2019) admitted to [facility] ICU after being intubated for seizure disorder. Prior to that, he appeared to have a pneumonia which subsequently resolved. He was previously in [facility] for a COPD exacerbation one year ago (5/2018). \par He is a current smoker of up to 2.5 ppd x 18 years though is now down to <1 ppd. I spent > 3 min discussing the risks of smoking and the benefits and therapy for smoking cessation including nicotine replacement, medications, and programs.\par He denies sleep complaints.\par Patient hospitalized in June 2019 for cough, SOB and was found to have ARF and sepsis. He is now improved and back to baseline. [Non-Productive Cough] : no non-productive cough [Wheezing] : no wheezing [Poor Compliance] : poor compliance with treatment [Fair Symptom Control] : fair symptom control

## 2019-11-12 NOTE — PROCEDURE
[FreeTextEntry1] : PFTs 5/3/19 - Mildly reduced FVC and moderately reduced FEV1 with an obstructive pattern and no significant BD response. Lung volumes were near normal though with evidence of gas trapping given an elevated RV. Diffusion capacity is mildly reduced.\par Spirometry 9/6/19 - Normal FVC an moderately reduced FEV1 with an obstructive pattern; essentially unchanged from previous\par Spirometry 11/12/19 was attempted but patient could not comply with directions and could not meet ATS criteria so its accuracy is uncertain

## 2019-11-12 NOTE — PHYSICAL EXAM
[Normal Appearance] : normal appearance [General Appearance - Well Developed] : well developed [Normal Oropharynx] : normal oropharynx [Normal Conjunctiva] : the conjunctiva exhibited no abnormalities [General Appearance - In No Acute Distress] : no acute distress [Neck Appearance] : the appearance of the neck was normal [Heart Rate And Rhythm] : heart rate and rhythm were normal [Heart Sounds] : normal S1 and S2 [Murmurs] : no murmurs present [Edema] : no peripheral edema present [Respiration, Rhythm And Depth] : normal respiratory rhythm and effort [] : no respiratory distress [Auscultation Breath Sounds / Voice Sounds] : lungs were clear to auscultation bilaterally [Exaggerated Use Of Accessory Muscles For Inspiration] : no accessory muscle use [Abdomen Soft] : soft [Abdomen Tenderness] : non-tender [Nail Clubbing] : no clubbing of the fingernails [Cyanosis, Localized] : no localized cyanosis [Abnormal Walk] : normal gait [Oriented To Time, Place, And Person] : oriented to person, place, and time [No Focal Deficits] : no focal deficits [FreeTextEntry1] : No abnormalities.

## 2019-11-12 NOTE — DISCUSSION/SUMMARY
[FreeTextEntry1] : \par #1. Pt with moderate COPD on spirometry\par #2. Continue Breo and Incruse daily; ProAir as needed\par #3. Albuterol (ProAir) or nebulizer Q6h as needed\par #4. Can consider Daliresp if needed but would avoid theophylline\par #5. Smoking cessation is imperative\par #6. F/u in 4 months for re-evaluation\par #7. Control seizures per neurology\par #8. Chest CT from 6/27/19 was with COPD changes but without infiltrates; will need chest CT for lung cancer screening after age 55\par #9. Pt promises to be off cigarettes by his next visit again\par \par Discussed above with patient and aide who was also present.

## 2019-11-12 NOTE — REVIEW OF SYSTEMS
[Seizures] : seizures [Depression] : depression [Anxiety] : anxiety [Diabetes] : diabetes mellitus [Fever] : no fever [Chills] : no chills [Dry Eyes] : no dryness of the eyes [Nasal Congestion] : no nasal congestion [Eye Irritation] : no ~T irritation of the eyes [Epistaxis] : no nosebleeds [Postnasal Drip] : no postnasal drip [Sinus Problems] : no sinus problems [Cough] : no cough [Sputum] : not coughing up ~M sputum [Dyspnea] : no dyspnea [Chest Tightness] : no chest tightness [Pleuritic Pain] : no pleuritic pain [Hypertension] : no ~T hypertension [Wheezing] : no wheezing [Chest Discomfort] : no chest discomfort [Dysrhythmia] : no dysrhythmia [Murmurs] : no murmurs were heard [Palpitations] : no palpitations [Edema] : ~T edema was not present [Itchy Eyes] : no itching of ~T the eyes [Hay Fever] : no hay fever [Reflux] : no reflux [Nausea] : no nausea [Constipation] : no constipation [Vomiting] : no vomiting [Diarrhea] : no diarrhea [Abdominal Pain] : no abdominal pain [Dysuria] : no dysuria [Anemia] : no anemia [Headache] : no headache [Dizziness] : no dizziness [Syncope] : no fainting [Numbness] : no numbness [Paralysis] : no paralysis was seen [Thyroid Problem] : no thyroid problem [Snoring] : no snoring [Witnessed Apneas] : demonstrated no ~M apnea [Nonrestorative Sleep] : restorative sleep

## 2019-11-12 NOTE — CONSULT LETTER
[Dear  ___] : Dear  [unfilled], [Please see my note below.] : Please see my note below. [Consult Letter:] : I had the pleasure of evaluating your patient, [unfilled]. [Sincerely,] : Sincerely, [Consult Closing:] : Thank you very much for allowing me to participate in the care of this patient.  If you have any questions, please do not hesitate to contact me. [FreeTextEntry3] : Carlos Gomez MD, FCCP, D. ABSM\par Pulmonary and Sleep Medicine\par [facility] at [place]\par \par

## 2019-11-15 ENCOUNTER — APPOINTMENT (OUTPATIENT)
Dept: ENDOCRINOLOGY | Facility: CLINIC | Age: 54
End: 2019-11-15
Payer: MEDICARE

## 2019-11-15 VITALS
SYSTOLIC BLOOD PRESSURE: 120 MMHG | DIASTOLIC BLOOD PRESSURE: 80 MMHG | WEIGHT: 174 LBS | HEART RATE: 98 BPM | BODY MASS INDEX: 23.06 KG/M2 | HEIGHT: 73 IN

## 2019-11-15 DIAGNOSIS — F17.200 NICOTINE DEPENDENCE, UNSPECIFIED, UNCOMPLICATED: ICD-10-CM

## 2019-11-15 DIAGNOSIS — F20.0 PARANOID SCHIZOPHRENIA: ICD-10-CM

## 2019-11-15 DIAGNOSIS — Z86.711 PERSONAL HISTORY OF PULMONARY EMBOLISM: ICD-10-CM

## 2019-11-15 DIAGNOSIS — D50.9 IRON DEFICIENCY ANEMIA, UNSPECIFIED: ICD-10-CM

## 2019-11-15 PROCEDURE — 99406 BEHAV CHNG SMOKING 3-10 MIN: CPT

## 2019-11-15 PROCEDURE — 99214 OFFICE O/P EST MOD 30 MIN: CPT | Mod: 25

## 2019-11-15 PROCEDURE — 82962 GLUCOSE BLOOD TEST: CPT

## 2019-11-15 RX ORDER — LATANOPROST/PF 0.005 %
0.01 DROPS OPHTHALMIC (EYE)
Qty: 2 | Refills: 0 | Status: DISCONTINUED | COMMUNITY
Start: 2019-04-10 | End: 2019-11-15

## 2019-11-15 RX ORDER — ALBUTEROL SULFATE 90 UG/1
108 (90 BASE) AEROSOL, METERED RESPIRATORY (INHALATION)
Qty: 1 | Refills: 3 | Status: DISCONTINUED | COMMUNITY
Start: 2019-08-07 | End: 2019-11-15

## 2019-11-15 RX ORDER — INSULIN ASPART 100 [IU]/ML
100 INJECTION, SOLUTION INTRAVENOUS; SUBCUTANEOUS
Qty: 30 | Refills: 0 | Status: DISCONTINUED | COMMUNITY
Start: 2019-04-25 | End: 2019-11-15

## 2019-11-15 RX ORDER — AMMONIUM LACTATE 12 %
12 CREAM (GRAM) TOPICAL
Qty: 385 | Refills: 0 | Status: DISCONTINUED | COMMUNITY
Start: 2019-10-05 | End: 2019-11-15

## 2019-11-15 RX ORDER — TRAVOPROST (BENZALKONIUM) 0.004 %
0 DROPS OPHTHALMIC (EYE)
Refills: 0 | Status: DISCONTINUED | COMMUNITY
End: 2019-11-15

## 2019-11-15 RX ORDER — HALOPERIDOL 5 MG/1
5 TABLET ORAL
Qty: 30 | Refills: 0 | Status: DISCONTINUED | COMMUNITY
Start: 2019-07-17 | End: 2019-11-15

## 2019-11-15 RX ORDER — ATORVASTATIN CALCIUM 20 MG/1
20 TABLET, FILM COATED ORAL
Qty: 30 | Refills: 0 | Status: DISCONTINUED | COMMUNITY
Start: 2019-04-10 | End: 2019-11-15

## 2019-11-15 RX ORDER — INSULIN GLARGINE 100 [IU]/ML
100 INJECTION, SOLUTION SUBCUTANEOUS
Qty: 15 | Refills: 0 | Status: DISCONTINUED | COMMUNITY
Start: 2019-04-10 | End: 2019-11-15

## 2019-11-19 LAB — GLUCOSE BLDC GLUCOMTR-MCNC: 280

## 2019-11-21 RX ORDER — IPRATROPIUM BROMIDE 21 UG/1
0.03 SPRAY NASAL
Refills: 0 | Status: ACTIVE | COMMUNITY

## 2019-11-21 RX ORDER — THEOPHYLLINE ANHYDROUS 300 MG
300 TABLET, EXTENDED RELEASE 12 HR ORAL TWICE DAILY
Refills: 0 | Status: ACTIVE | COMMUNITY

## 2019-11-21 RX ORDER — SERTRALINE HYDROCHLORIDE 100 MG/1
100 TABLET, FILM COATED ORAL DAILY
Refills: 0 | Status: ACTIVE | COMMUNITY

## 2019-11-21 RX ORDER — ROFLUMILAST 500 UG/1
500 TABLET ORAL DAILY
Refills: 0 | Status: ACTIVE | COMMUNITY

## 2019-11-21 RX ORDER — SUCRALFATE 1 G/1
1 TABLET ORAL 4 TIMES DAILY
Refills: 0 | Status: ACTIVE | COMMUNITY

## 2019-11-21 RX ORDER — BENZTROPINE MESYLATE 1 MG
1 TABLET ORAL TWICE DAILY
Refills: 0 | Status: ACTIVE | COMMUNITY

## 2019-11-21 RX ORDER — LISINOPRIL 2.5 MG/1
2.5 TABLET ORAL DAILY
Refills: 0 | Status: ACTIVE | COMMUNITY

## 2019-11-21 RX ORDER — CHLORHEXIDINE GLUCONATE 4 %
325 (65 FE) LIQUID (ML) TOPICAL TWICE DAILY
Refills: 0 | Status: ACTIVE | COMMUNITY

## 2019-11-21 RX ORDER — IPRATROPIUM BROMIDE AND ALBUTEROL SULFATE 2.5; .5 MG/3ML; MG/3ML
0.5-2.5 (3) SOLUTION RESPIRATORY (INHALATION) 4 TIMES DAILY
Refills: 0 | Status: ACTIVE | COMMUNITY

## 2019-11-21 RX ORDER — ESOMEPRAZOLE MAGNESIUM 20 MG/1
20 CAPSULE, DELAYED RELEASE ORAL DAILY
Refills: 0 | Status: ACTIVE | COMMUNITY

## 2019-11-21 RX ORDER — QUETIAPINE 300 MG/1
300 TABLET, FILM COATED ORAL DAILY
Refills: 0 | Status: ACTIVE | COMMUNITY

## 2019-11-21 RX ORDER — DOCUSATE SODIUM 100 MG/1
100 CAPSULE ORAL 3 TIMES DAILY
Refills: 0 | Status: ACTIVE | COMMUNITY

## 2019-11-21 RX ORDER — ZIPRASIDONE HYDROCHLORIDE 20 MG/1
20 CAPSULE ORAL TWICE DAILY
Refills: 0 | Status: ACTIVE | COMMUNITY

## 2019-11-21 RX ORDER — MONTELUKAST SODIUM 10 MG/1
10 TABLET, FILM COATED ORAL DAILY
Refills: 0 | Status: ACTIVE | COMMUNITY

## 2019-11-21 RX ORDER — MELATONIN 5 MG
5 CAPSULE ORAL AT BEDTIME
Refills: 0 | Status: ACTIVE | COMMUNITY

## 2019-11-21 RX ORDER — CLOPIDOGREL 75 MG/1
75 TABLET, FILM COATED ORAL DAILY
Refills: 0 | Status: ACTIVE | COMMUNITY

## 2019-11-21 RX ORDER — SIMVASTATIN 20 MG/1
20 TABLET, FILM COATED ORAL DAILY
Refills: 0 | Status: ACTIVE | COMMUNITY

## 2019-11-21 RX ORDER — LEVETIRACETAM 1000 MG/1
1000 TABLET, FILM COATED ORAL TWICE DAILY
Refills: 0 | Status: ACTIVE | COMMUNITY

## 2019-11-21 NOTE — ASSESSMENT
[FreeTextEntry1] : 54 year old male with uncontrolled T2DM, hypertension, and hyperlipidemia. \par \par 1. T2DM - unknown control\par -Continue current insulin doses\par - Fax glucose log sheets - will adjust insulin based on glucose logs\par - Fax ophthalmology consult report\par - Fax podiatry consult report\par - Updated labs needed, see Lab Rx\par \par 2. Hypertension- controlled.\par -Continue current BP meds\par \par 3. Hyperlipidemia\par -Continue statin.\par -UPdated labs needed\par \par 4. smoker - I discussed at length with the patient concerning cessation of tobacco use.  I reviewed with the patient the adverse affects that smoking has on vascular health, morbidity and mortality. We discussed the use of nicotine replacement, psychiatric counseling and medical therapy.  Counseling time was 3 minutes.  Educational material provided.\par

## 2019-11-21 NOTE — HISTORY OF PRESENT ILLNESS
[FreeTextEntry1] : Patient presents today for follow-up of diabetes.  \par He lives in a group home ([facility], [place]). Med tech gives pills and administers insulin. Meals provided\par Aide is present at appointment. \par Reports recent hospitalization at [facility] for weight loss and vomiting - seen by GI in hospital\par \par Type: 2\par Severity: well controlled\par Duration: chronic\par Associated symptoms: hyperglycemia\par Modifying factors: better with insulin\par \par Current meds for glycemic control: pens or vials? pt does not know insulin doses. per med list:\par Levemir 30 units BID\par Humalog sliding scale \par 200-250 2 units, 251-300 4, 301-350 6, 351-400 8\par \par SMBG performed by nurse at group home. Now monitoring 3x daily, before each meal. No logs with pt. Pr reports erratic numbers.\par \par Complications: pt does not know\par denies retinopathy, neuropathy, nephropathy\par \par Lifestyle\par Diet: meals provided at group home, diabetic meals\par Exercise: rare

## 2019-11-21 NOTE — PHYSICAL EXAM
[Well Nourished] : well nourished [Alert] : alert [No Acute Distress] : no acute distress [Normal Sclera/Conjunctiva] : normal sclera/conjunctiva [Well Developed] : well developed [EOMI] : extra ocular movement intact [No Proptosis] : no proptosis [Normal Oropharynx] : the oropharynx was normal [No Respiratory Distress] : no respiratory distress [Normal Rate] : heart rate was normal  [Normal S1, S2] : normal S1 and S2 [No Edema] : there was no peripheral edema [Oriented x3] : oriented to person, place, and time [Normal Affect] : the affect was normal [Normal Mood] : the mood was normal [Normal Bowel Sounds] : normal bowel sounds [Not Tender] : non-tender [Soft] : abdomen soft [Right Foot Was Examined] : right foot ~C was examined [Left Foot Was Examined] : left foot ~C was examined [2+] : 2+ in the dorsalis pedis [Cranial Nerves Intact] : cranial nerves 2-12 were intact [Normal Reflexes] : deep tendon reflexes were 2+ and symmetric [No Tremors] : no tremors [Foot Ulcers] : no foot ulcers [Acanthosis Nigricans] : no acanthosis nigricans [Vibration Dec.] : normal vibratory sensation at the level of the toes [Position Sense Dec.] : normal position sense at the level of the toes [Diminished Throughout Both Feet] : normal tactile sensation with monofilament testing throughout both feet [de-identified] : Vitals: /80, pulse 98,  [de-identified] : mild expiratory wheeze, prolonged expiration (COPD) [de-identified] : Feet onychyomycosis, hammar toes

## 2019-11-21 NOTE — REVIEW OF SYSTEMS
[Recent Weight Loss (___ Lbs)] : recent [unfilled] ~Ulb weight loss [Polydipsia] : polydipsia [Shortness Of Breath] : shortness of breath [SOB on Exertion] : shortness of breath during exertion [Vomiting] : vomiting was observed [Nausea] : nausea [Nocturia] : nocturia [Fatigue] : no fatigue [Blurry Vision] : no blurred vision [Chest Pain] : no chest pain [Palpitations] : no palpitations [Constipation] : no constipation [Diarrhea] : no diarrhea [Abdominal Pain] : no abdominal pain [Polyuria] : no polyuria [Joint Pain] : no joint pain [Myalgia] : no myalgia  [Headache] : no headaches [Pain/Numbness of Digits] : no pain/numbness of digits [FreeTextEntry6] : has COPD [FreeTextEntry7] : 2 weeks ago. chronic constipation

## 2019-11-21 NOTE — CONSULT LETTER
[Dear  ___] : Dear  [unfilled], [Courtesy Letter:] : I had the pleasure of seeing your patient, [unfilled], in my office today. [Please see my note below.] : Please see my note below. [Sincerely,] : Sincerely, [FreeTextEntry3] : Destini Ballesteros, \par

## 2019-12-26 ENCOUNTER — CLINICAL ADVICE (OUTPATIENT)
Age: 54
End: 2019-12-26

## 2019-12-26 DIAGNOSIS — E87.5 HYPERKALEMIA: ICD-10-CM

## 2020-01-06 ENCOUNTER — APPOINTMENT (OUTPATIENT)
Dept: PULMONOLOGY | Facility: CLINIC | Age: 55
End: 2020-01-06

## 2020-02-18 NOTE — DIETITIAN INITIAL EVALUATION ADULT. - PROBLEM/PLAN-4
Patient had to cancel her appointment this Thursday, states that she cannot afford an office visit at this time, and she will be establishing with Dr. Elvin Sanchez in a few months.  Wants to know if she can get her lab results, and any refills to hold her over until she sees Dr. Elvin Sanchez
Spoke with patient, per Doctor Stella Cisneros OK to fill script through July when patient see's Doctor Helder Bejarano, appointment scheduled for Thursday cancelled per patient request
DISPLAY PLAN FREE TEXT

## 2020-02-19 LAB
HBA1C MFR BLD HPLC: 7.6
LDLC SERPL DIRECT ASSAY-MCNC: 78.2

## 2020-02-20 ENCOUNTER — APPOINTMENT (OUTPATIENT)
Dept: ENDOCRINOLOGY | Facility: CLINIC | Age: 55
End: 2020-02-20
Payer: MEDICARE

## 2020-02-20 VITALS
HEIGHT: 73 IN | HEART RATE: 79 BPM | SYSTOLIC BLOOD PRESSURE: 100 MMHG | WEIGHT: 181 LBS | OXYGEN SATURATION: 92 % | BODY MASS INDEX: 23.99 KG/M2 | DIASTOLIC BLOOD PRESSURE: 80 MMHG

## 2020-02-20 LAB — GLUCOSE BLDC GLUCOMTR-MCNC: 176

## 2020-02-20 PROCEDURE — 99214 OFFICE O/P EST MOD 30 MIN: CPT | Mod: 25

## 2020-02-20 PROCEDURE — 82962 GLUCOSE BLOOD TEST: CPT

## 2020-02-20 RX ORDER — INSULIN ASPART 100 [IU]/ML
100 INJECTION, SOLUTION INTRAVENOUS; SUBCUTANEOUS
Qty: 1 | Refills: 0 | Status: ACTIVE | COMMUNITY
Start: 2020-02-20 | End: 1900-01-01

## 2020-02-20 RX ORDER — INSULIN DETEMIR 100 [IU]/ML
100 INJECTION, SOLUTION SUBCUTANEOUS DAILY
Qty: 1 | Refills: 0 | Status: ACTIVE | COMMUNITY
Start: 2019-04-28 | End: 1900-01-01

## 2020-02-20 RX ORDER — INSULIN LISPRO 100 [IU]/ML
100 INJECTION, SOLUTION INTRAVENOUS; SUBCUTANEOUS
Refills: 0 | Status: DISCONTINUED | COMMUNITY
End: 2020-02-20

## 2020-02-20 NOTE — ASSESSMENT
[FreeTextEntry1] : 54 year old male with Type 2 DM, Hypertension, and Hyperlipidemia. \par \par Plan: \par Type 2 DM- A1C 7.0% - controlled \par -Continue current insulin doses\par - Fax medication list - to confirm medication doses \par \par Hypertension- controlled.\par -Continue current BP meds\par \par Hyperlipidemia\par -Continue statin.\par -UPdated labs needed\par \par  smoker - I discussed at length with the patient concerning cessation of tobacco use.  I reviewed with the patient the adverse affects that smoking has on vascular health, morbidity and mortality. We discussed the use of nicotine replacement, psychiatric counseling and medical therapy.  Counseling time was 3 minutes.  Educational material provided. Pt. states that he is going to join a smoking cessation group. \par \par Labs and follow up visit in 3 months. \par  [Smoking Cessation] : smoking cessation

## 2020-02-20 NOTE — PHYSICAL EXAM
[Alert] : alert [No Acute Distress] : no acute distress [Well Nourished] : well nourished [Well Developed] : well developed [Normal Sclera/Conjunctiva] : normal sclera/conjunctiva [EOMI] : extra ocular movement intact [Supple] : the neck was supple [No LAD] : no lymphadenopathy [Thyroid Not Enlarged] : the thyroid was not enlarged [Normal Rate and Effort] : normal respiratory rhythm and effort [No Accessory Muscle Use] : no accessory muscle use [Normal Rate] : heart rate was normal  [Normal S1, S2] : normal S1 and S2 [Regular Rhythm] : with a regular rhythm [Normal Bowel Sounds] : normal bowel sounds [Soft] : abdomen soft [Not Tender] : non-tender [Normal Gait] : normal gait [No Rash] : no rash [Oriented x3] : oriented to person, place, and time [Acanthosis Nigricans] : no acanthosis nigricans [de-identified] : expiratory wheeze  [de-identified] : Pt. declined foot examination  [de-identified] : left hand tremor [de-identified] : impaired insight

## 2020-02-20 NOTE — REVIEW OF SYSTEMS
[Tremors] : tremors [Fatigue] : no fatigue [Decreased Appetite] : appetite not decreased [Recent Weight Gain (___ Lbs)] : no recent weight gain [Recent Weight Loss (___ Lbs)] : no recent weight loss [Visual Field Defect] : no visual field defect [Blurry Vision] : no blurred vision [Dysphagia] : no dysphagia [Dysphonia] : no dysphonia [Neck Pain] : no neck pain [Chest Pain] : no chest pain [Palpitations] : no palpitations [Constipation] : no constipation [Polyuria] : no polyuria [Diarrhea] : no diarrhea [Dysuria] : no dysuria [Headache] : no headaches [Depression] : no depression [Anxiety] : no anxiety [Polydipsia] : no polydipsia [Cold Intolerance] : cold tolerant [Heat Intolerance] : heat tolerant [Easy Bruising] : no tendency for easy bruising [Swelling] : no swelling [FreeTextEntry2] : weight stable [de-identified] : left hand tremors for 15 years

## 2020-02-20 NOTE — REASON FOR VISIT
[Follow-Up: _____] : a [unfilled] follow-up visit [FreeTextEntry1] : Type 2 DM, Hypertension, and Hyperlipidemia

## 2020-02-20 NOTE — HISTORY OF PRESENT ILLNESS
[FreeTextEntry1] : He lives in a group home ([facility], [place]). Med tech gives pills and administers insulin. Meals provided\par Aide is present at appointment. \par Reports in December was hospitalized at [facility] DKA- placed on insulin pump and fluids given  \par \par Type: 2\par Severity: well controlled\par Duration: chronic\par Associated symptoms: hyperglycemia, retinopathy \par Modifying factors: better with insulin\par \par Current meds for glycemic control: pens or vials? pt does not know insulin doses. per med list:\par Levemir 35 units BID\par Humalog sliding scale \par 200-250 2 units, 251-300 4, 301-350 6, 351-400 8\par \par SMBG performed by nurse at group home. Now monitoring 3x daily, before each meal. Per logs erratic numbers, he states blood sugar in am is taken after breakfast. \par  today in office \par \par Complications: pt does not know\par denies neuropathy, nephropathy\par \par Lifestyle\par Diet: meals provided at group home, diabetic meals\par Exercise: rare\par \par Last eye exam: 12/13/19 +DR, +glaucoma \par Last foot exam: 1/17/20 denies neuropathy

## 2020-03-24 RX ORDER — UMECLIDINIUM 62.5 UG/1
62.5 AEROSOL, POWDER ORAL DAILY
Qty: 1 | Refills: 5 | Status: ACTIVE | COMMUNITY
Start: 2019-08-07 | End: 1900-01-01

## 2020-05-13 ENCOUNTER — APPOINTMENT (OUTPATIENT)
Dept: PULMONOLOGY | Facility: CLINIC | Age: 55
End: 2020-05-13

## 2020-05-22 ENCOUNTER — APPOINTMENT (OUTPATIENT)
Dept: ENDOCRINOLOGY | Facility: CLINIC | Age: 55
End: 2020-05-22

## 2020-05-26 LAB
HBA1C MFR BLD HPLC: 7.1
LDLC SERPL DIRECT ASSAY-MCNC: 54.9

## 2020-05-27 ENCOUNTER — APPOINTMENT (OUTPATIENT)
Dept: ENDOCRINOLOGY | Facility: CLINIC | Age: 55
End: 2020-05-27
Payer: MEDICARE

## 2020-05-27 DIAGNOSIS — R94.6 ABNORMAL RESULTS OF THYROID FUNCTION STUDIES: ICD-10-CM

## 2020-05-27 DIAGNOSIS — F17.210 NICOTINE DEPENDENCE, CIGARETTES, UNCOMPLICATED: ICD-10-CM

## 2020-05-27 DIAGNOSIS — I10 ESSENTIAL (PRIMARY) HYPERTENSION: ICD-10-CM

## 2020-05-27 DIAGNOSIS — E78.5 HYPERLIPIDEMIA, UNSPECIFIED: ICD-10-CM

## 2020-05-27 DIAGNOSIS — E11.65 TYPE 2 DIABETES MELLITUS WITH HYPERGLYCEMIA: ICD-10-CM

## 2020-05-27 PROCEDURE — 99442: CPT | Mod: 95

## 2020-05-27 NOTE — ASSESSMENT
[FreeTextEntry1] : 54 year old smoking male with Type 2 DM, Hypertension, and Hyperlipidemia. \par \par Plan: \par Type 2 DM- A1C 7.1% - controlled \par -Continue current Rx \par - educated on the importance of diet and exercise \par - Fax medication list - to confirm medication doses and blood sugar logs \par \par Hypertension-\par -Continue current medication regimen \par \par Hyperlipidemia - controlled \par -Continue statin.\par \par Abnormal TSH - repeat TFTs along with TSI \par \par \par  smoker - I discussed at length with the patient concerning cessation of tobacco use.  I reviewed with the patient the adverse affects that smoking has on vascular health, morbidity and mortality. We discussed the use of nicotine replacement, psychiatric counseling and medical therapy.  Counseling time was 3 minutes.  Educational material provided. Pt. states that he is going to join a smoking cessation group. \par \par Labs and follow up visit in 3 months. \par \par Pt. and nurse Valencia verbalized understanding of plan.\par \par Start Time: 9:55\par End Time: 10:10 [Importance of Diet and Exercise] : importance of diet and exercise to improve glycemic control, achieve weight loss and improve cardiovascular health [Smoking Cessation] : smoking cessation

## 2020-05-27 NOTE — HISTORY OF PRESENT ILLNESS
[Medical Office: ([facility])___] : at the medical office located in  [Home] : at home, [unfilled] , at the time of the visit. [Verbal consent obtained from patient] : the patient, [unfilled] [FreeTextEntry1] : He lives in a group home ([facility] Med tech gives pills and administers insulin. Meals provided. \par No recent hospitalizations. \par \par Type: 2\par Severity: well controlled\par Duration: chronic\par Associated symptoms: hyperglycemia, retinopathy \par Modifying factors: better with insulin\par \par Current meds for glycemic control: pens or vials? pt does not know insulin doses. per med list:\par Levemir 35 units BID\par Humalog sliding scale \par 200-250 2 units, 251-300 4, 301-350 6, 351-400 8\par \par SMBG performed by nurse at group home. Now monitoring 3x daily, before each meal. Per logs erratic numbers, he states blood sugar in am is taken after breakfast. \par rarely has low blood sugars \par \par \par Complications: pt does not know\par denies neuropathy, nephropathy\par \par Lifestyle\par Diet: meals provided at group home, diabetic meals\par Exercise: rare\par \par Last eye exam: 12/13/19 +DR, +glaucoma \par Last foot exam: 5/2020 denies neuropathy

## 2020-05-27 NOTE — REVIEW OF SYSTEMS
[Fatigue] : no fatigue [Recent Weight Gain (___ Lbs)] : no recent weight gain [Decreased Appetite] : appetite not decreased [Visual Field Defect] : no visual field defect [Recent Weight Loss (___ Lbs)] : no recent weight loss [Blurred Vision] : no blurred vision [Dysphagia] : no dysphagia [Neck Pain] : no neck pain [Dysphonia] : no dysphonia [Constipation] : no constipation [Diarrhea] : no diarrhea [Polyuria] : no polyuria [Dysuria] : no dysuria [Polydipsia] : no polydipsia [Cold Intolerance] : no cold intolerance [Heat Intolerance] : no heat intolerance [FreeTextEntry2] : weight stable

## 2020-06-03 RX ORDER — FLASH GLUCOSE SCANNING READER
EACH MISCELLANEOUS
Qty: 1 | Refills: 0 | Status: ACTIVE | COMMUNITY
Start: 2020-06-03 | End: 1900-01-01

## 2020-06-03 RX ORDER — FLASH GLUCOSE SENSOR
KIT MISCELLANEOUS
Qty: 6 | Refills: 0 | Status: ACTIVE | COMMUNITY
Start: 2020-06-03 | End: 1900-01-01

## 2020-06-16 ENCOUNTER — APPOINTMENT (OUTPATIENT)
Dept: NEUROLOGY | Facility: CLINIC | Age: 55
End: 2020-06-16
Payer: MEDICARE

## 2020-06-16 DIAGNOSIS — G40.909 EPILEPSY, UNSPECIFIED, NOT INTRACTABLE, W/OUT STATUS EPILEPTICUS: ICD-10-CM

## 2020-06-16 DIAGNOSIS — D49.6 NEOPLASM OF UNSPECIFIED BEHAVIOR OF BRAIN: ICD-10-CM

## 2020-06-16 PROCEDURE — 99204 OFFICE O/P NEW MOD 45 MIN: CPT

## 2020-06-16 NOTE — ASSESSMENT
[FreeTextEntry1] : This is a 54-year-old man with presumed meningioma in the left parietal region. He also has seizures. At this time and like to do an MRI of his brain with and without contrast to further clarify and identified this meningioma. Depending on this result he may need to be referred for neurosurgery. I would also recommend to continue Keppra 1000 mg twice a day as it appears to be controlling his seizures well. I will see him back in the office in 6 months, sooner should the need arise. I will call him with the results of the MRI once available.

## 2020-06-16 NOTE — CONSULT LETTER
[FreeTextEntry3] : Sukhjinder Bernal M.D., Ph.D. DPN-N\par [facility]\par Neurology at [place]\par Medical Director of Stroke Services\par [facility]\par

## 2020-06-16 NOTE — HISTORY OF PRESENT ILLNESS
[FreeTextEntry1] : Initial office visit June 16, 2020:\par This is a 54-year-old man who presents today for neurologic evaluation of seizure and brain tumor. He states that in 1990 he was diagnosed with a benign brain tumor. He states it's on the surface of the brain and into the left parietal region. It sounds like a meningioma possibly. Last year he had seizures and was taken to [facility]. He was started and discharged on Keppra 1000 mg twice a day. He has not had another seizure since. He is here today for neurologic evaluation and treatment.

## 2020-06-24 ENCOUNTER — APPOINTMENT (OUTPATIENT)
Dept: MRI IMAGING | Facility: CLINIC | Age: 55
End: 2020-06-24
Payer: MEDICARE

## 2020-06-24 ENCOUNTER — OUTPATIENT (OUTPATIENT)
Dept: OUTPATIENT SERVICES | Facility: HOSPITAL | Age: 55
LOS: 1 days | End: 2020-06-24
Payer: MEDICARE

## 2020-06-24 DIAGNOSIS — G40.909 EPILEPSY, UNSPECIFIED, NOT INTRACTABLE, WITHOUT STATUS EPILEPTICUS: ICD-10-CM

## 2020-06-24 DIAGNOSIS — D49.6 NEOPLASM OF UNSPECIFIED BEHAVIOR OF BRAIN: ICD-10-CM

## 2020-06-24 PROCEDURE — 70553 MRI BRAIN STEM W/O & W/DYE: CPT | Mod: 26

## 2020-06-24 PROCEDURE — 70553 MRI BRAIN STEM W/O & W/DYE: CPT

## 2020-06-24 PROCEDURE — A9585: CPT

## 2020-08-08 ENCOUNTER — EMERGENCY (EMERGENCY)
Facility: HOSPITAL | Age: 55
LOS: 1 days | Discharge: HOME | End: 2020-08-08
Attending: EMERGENCY MEDICINE
Payer: MEDICARE

## 2020-08-08 VITALS
OXYGEN SATURATION: 94 % | SYSTOLIC BLOOD PRESSURE: 107 MMHG | DIASTOLIC BLOOD PRESSURE: 52 MMHG | RESPIRATION RATE: 18 BRPM | HEART RATE: 82 BPM | TEMPERATURE: 98 F

## 2020-08-08 LAB
ALBUMIN SERPL ELPH-MCNC: 3.7 G/DL — SIGNIFICANT CHANGE UP (ref 3.3–5.2)
ALP SERPL-CCNC: 81 U/L — SIGNIFICANT CHANGE UP (ref 40–120)
ALT FLD-CCNC: 7 U/L — SIGNIFICANT CHANGE UP
ANION GAP SERPL CALC-SCNC: 10 MMOL/L — SIGNIFICANT CHANGE UP (ref 5–17)
APTT BLD: 46.9 SEC — HIGH (ref 27.5–35.5)
AST SERPL-CCNC: 11 U/L — SIGNIFICANT CHANGE UP
BASOPHILS # BLD AUTO: 0.03 K/UL — SIGNIFICANT CHANGE UP (ref 0–0.2)
BASOPHILS NFR BLD AUTO: 0.3 % — SIGNIFICANT CHANGE UP (ref 0–2)
BILIRUB SERPL-MCNC: 0.2 MG/DL — LOW (ref 0.4–2)
BUN SERPL-MCNC: 11 MG/DL — SIGNIFICANT CHANGE UP (ref 8–20)
CALCIUM SERPL-MCNC: 9.1 MG/DL — SIGNIFICANT CHANGE UP (ref 8.6–10.2)
CHLORIDE SERPL-SCNC: 94 MMOL/L — LOW (ref 98–107)
CO2 SERPL-SCNC: 32 MMOL/L — HIGH (ref 22–29)
CREAT SERPL-MCNC: 0.65 MG/DL — SIGNIFICANT CHANGE UP (ref 0.5–1.3)
EOSINOPHIL # BLD AUTO: 0.15 K/UL — SIGNIFICANT CHANGE UP (ref 0–0.5)
EOSINOPHIL NFR BLD AUTO: 1.6 % — SIGNIFICANT CHANGE UP (ref 0–6)
GLUCOSE SERPL-MCNC: 152 MG/DL — HIGH (ref 70–99)
HCT VFR BLD CALC: 37.4 % — LOW (ref 39–50)
HGB BLD-MCNC: 12.2 G/DL — LOW (ref 13–17)
IMM GRANULOCYTES NFR BLD AUTO: 0.2 % — SIGNIFICANT CHANGE UP (ref 0–1.5)
INR BLD: 1.25 RATIO — HIGH (ref 0.88–1.16)
LACTATE BLDV-MCNC: 1.1 MMOL/L — SIGNIFICANT CHANGE UP (ref 0.5–2)
LYMPHOCYTES # BLD AUTO: 1.34 K/UL — SIGNIFICANT CHANGE UP (ref 1–3.3)
LYMPHOCYTES # BLD AUTO: 14 % — SIGNIFICANT CHANGE UP (ref 13–44)
MCHC RBC-ENTMCNC: 28.8 PG — SIGNIFICANT CHANGE UP (ref 27–34)
MCHC RBC-ENTMCNC: 32.6 GM/DL — SIGNIFICANT CHANGE UP (ref 32–36)
MCV RBC AUTO: 88.4 FL — SIGNIFICANT CHANGE UP (ref 80–100)
MONOCYTES # BLD AUTO: 1.11 K/UL — HIGH (ref 0–0.9)
MONOCYTES NFR BLD AUTO: 11.6 % — SIGNIFICANT CHANGE UP (ref 2–14)
NEUTROPHILS # BLD AUTO: 6.93 K/UL — SIGNIFICANT CHANGE UP (ref 1.8–7.4)
NEUTROPHILS NFR BLD AUTO: 72.3 % — SIGNIFICANT CHANGE UP (ref 43–77)
NT-PROBNP SERPL-SCNC: 104 PG/ML — SIGNIFICANT CHANGE UP (ref 0–300)
PLATELET # BLD AUTO: 257 K/UL — SIGNIFICANT CHANGE UP (ref 150–400)
POTASSIUM SERPL-MCNC: 3.6 MMOL/L — SIGNIFICANT CHANGE UP (ref 3.5–5.3)
POTASSIUM SERPL-SCNC: 3.6 MMOL/L — SIGNIFICANT CHANGE UP (ref 3.5–5.3)
PROT SERPL-MCNC: 6.5 G/DL — LOW (ref 6.6–8.7)
PROTHROM AB SERPL-ACNC: 14.3 SEC — HIGH (ref 10.6–13.6)
RBC # BLD: 4.23 M/UL — SIGNIFICANT CHANGE UP (ref 4.2–5.8)
RBC # FLD: 14.4 % — SIGNIFICANT CHANGE UP (ref 10.3–14.5)
SODIUM SERPL-SCNC: 136 MMOL/L — SIGNIFICANT CHANGE UP (ref 135–145)
TROPONIN T SERPL-MCNC: <0.01 NG/ML — SIGNIFICANT CHANGE UP (ref 0–0.06)
WBC # BLD: 9.58 K/UL — SIGNIFICANT CHANGE UP (ref 3.8–10.5)
WBC # FLD AUTO: 9.58 K/UL — SIGNIFICANT CHANGE UP (ref 3.8–10.5)

## 2020-08-08 PROCEDURE — 96374 THER/PROPH/DIAG INJ IV PUSH: CPT

## 2020-08-08 PROCEDURE — 96375 TX/PRO/DX INJ NEW DRUG ADDON: CPT

## 2020-08-08 PROCEDURE — 99284 EMERGENCY DEPT VISIT MOD MDM: CPT

## 2020-08-08 PROCEDURE — 99284 EMERGENCY DEPT VISIT MOD MDM: CPT | Mod: 25

## 2020-08-08 PROCEDURE — 93005 ELECTROCARDIOGRAM TRACING: CPT

## 2020-08-08 PROCEDURE — 93971 EXTREMITY STUDY: CPT | Mod: 26,RT

## 2020-08-08 PROCEDURE — 71045 X-RAY EXAM CHEST 1 VIEW: CPT | Mod: 26

## 2020-08-08 PROCEDURE — 85027 COMPLETE CBC AUTOMATED: CPT

## 2020-08-08 PROCEDURE — 36415 COLL VENOUS BLD VENIPUNCTURE: CPT

## 2020-08-08 PROCEDURE — 93010 ELECTROCARDIOGRAM REPORT: CPT

## 2020-08-08 PROCEDURE — 93971 EXTREMITY STUDY: CPT

## 2020-08-08 PROCEDURE — 71045 X-RAY EXAM CHEST 1 VIEW: CPT

## 2020-08-08 PROCEDURE — 85730 THROMBOPLASTIN TIME PARTIAL: CPT

## 2020-08-08 PROCEDURE — 80053 COMPREHEN METABOLIC PANEL: CPT

## 2020-08-08 PROCEDURE — 83880 ASSAY OF NATRIURETIC PEPTIDE: CPT

## 2020-08-08 PROCEDURE — 83605 ASSAY OF LACTIC ACID: CPT

## 2020-08-08 PROCEDURE — 85610 PROTHROMBIN TIME: CPT

## 2020-08-08 PROCEDURE — 84484 ASSAY OF TROPONIN QUANT: CPT

## 2020-08-08 RX ORDER — AMPICILLIN SODIUM AND SULBACTAM SODIUM 250; 125 MG/ML; MG/ML
INJECTION, POWDER, FOR SUSPENSION INTRAMUSCULAR; INTRAVENOUS
Refills: 0 | Status: DISCONTINUED | OUTPATIENT
Start: 2020-08-08 | End: 2020-08-13

## 2020-08-08 RX ORDER — AMPICILLIN SODIUM AND SULBACTAM SODIUM 250; 125 MG/ML; MG/ML
1.5 INJECTION, POWDER, FOR SUSPENSION INTRAMUSCULAR; INTRAVENOUS EVERY 6 HOURS
Refills: 0 | Status: DISCONTINUED | OUTPATIENT
Start: 2020-08-09 | End: 2020-08-13

## 2020-08-08 RX ORDER — CEPHALEXIN 500 MG
1 CAPSULE ORAL
Qty: 21 | Refills: 0
Start: 2020-08-08 | End: 2020-08-14

## 2020-08-08 RX ORDER — AMPICILLIN SODIUM AND SULBACTAM SODIUM 250; 125 MG/ML; MG/ML
1.5 INJECTION, POWDER, FOR SUSPENSION INTRAMUSCULAR; INTRAVENOUS ONCE
Refills: 0 | Status: COMPLETED | OUTPATIENT
Start: 2020-08-08 | End: 2020-08-08

## 2020-08-08 RX ADMIN — Medication 110 MILLIGRAM(S): at 18:58

## 2020-08-08 RX ADMIN — AMPICILLIN SODIUM AND SULBACTAM SODIUM 100 GRAM(S): 250; 125 INJECTION, POWDER, FOR SUSPENSION INTRAMUSCULAR; INTRAVENOUS at 18:32

## 2020-08-08 NOTE — ED PROVIDER NOTE - ATTENDING CONTRIBUTION TO CARE
The patient seen and examined    Cellulitis    I, Christopher Go, performed the initial face to face bedside interview with this patient regarding history of present illness, review of symptoms and relevant past medical, social and family history.  I completed an independent physical examination.  I was the initial provider who evaluated this patient. I have signed out the follow up of any pending tests (i.e. labs, radiological studies) to the resident.  I have communicated the patient’s plan of care and disposition with the resident.

## 2020-08-08 NOTE — ED PROVIDER NOTE - PATIENT PORTAL LINK FT
You can access the FollowMyHealth Patient Portal offered by [facility] by registering at the following website: [email or web address]. By joining [email or web address] FollowMyHealth portal, you will also be able to view your health information using other applications (apps) compatible with our system.

## 2020-08-08 NOTE — ED PROVIDER NOTE - PROGRESS NOTE DETAILS
RLE U/S Doppler negative. Suspect cellulitis. Will give patient dose of Unasyn and doxy in hospital, 7 day course Keflex and Doxy outpatient.

## 2020-08-08 NOTE — ED PROVIDER NOTE - PHYSICAL EXAMINATION
Const: Awake, alert and oriented. In no acute distress. Well appearing.  HEENT: NC/AT. Moist mucous membranes.  Eyes: No scleral icterus. EOMI.  Neck:. Soft and supple. Full ROM without pain.  Cardiac: Regular rate and regular rhythm. +S1/S2. Peripheral pulses 2+ and symmetric. No LE edema.  Resp: Speaking in full sentences. No evidence of respiratory distress. No wheezes, rales or rhonchi.  Abd: Soft, non-tender, non-distended. Normal bowel sounds in all 4 quadrants. No guarding or rebound.  Back: Spine midline and non-tender. No CVAT.  Skin: No rashes, abrasions or lacerations.  MSK: RLE calf painful to palpation and swelling. Mild erythema noted overlying right calf.   Lymph: No cervical lymphadenopathy.  Neuro: Awake, alert & oriented x 3. Moves all extremities symmetrically.  Psych: Flat affect.

## 2020-08-08 NOTE — ED PROVIDER NOTE - CLINICAL SUMMARY MEDICAL DECISION MAKING FREE TEXT BOX
53 yo male presents with RLE swelling over past week. Concern for possible DVT. Also will r/o PE. Order CBC, CMP, Troponin, BNP, venous doppler u/s RLE, coags, lactate, chest XRay to investigate for further pathology.

## 2020-08-08 NOTE — ED ADULT NURSE REASSESSMENT NOTE - NS ED NURSE REASSESS COMMENT FT1
Iv ABX given. Discharge instructions reviewed with patient who verbalized understanding. Iv removed. Cab voucher given to patient and transportation set up for patient to get home. Pt given cane with proper return demonstration. Discharged home.

## 2020-08-08 NOTE — CHART NOTE - NSCHARTNOTEFT_GEN_A_CORE
SW Note: SW alerted by  that pt needs transport home, does not have transport benefit through [employer]. SW met with pt in [facility], pt reports he resides at [facility]. Voucher Approved by JESSENIA Glover for $12, pt provided with voucher, no further SW services at this time

## 2020-08-08 NOTE — ED PROVIDER NOTE - OBJECTIVE STATEMENT
53 yo male history of Schizophrenia, DM, HTN, HLD presents with 1 week of RLE swelling/pain. Patient states that the pain is achy in nature, does not seem to radiate. He states that his skin has reddened in the area of his RLE swelling compared to the left. He does not report any chest pain, SOB, palpitations. He is not experiencing any fevers or chills. Patient denies any recent long distance travel, surgery, immobilization. He is an everyday smoker.

## 2020-08-08 NOTE — ED ADULT NURSE NOTE - OBJECTIVE STATEMENT
pt c/o right lower extremity swelling x 1 week. Pt stated he just had a sonogram done last week to rule out a blood clot. Swelling noted, mild erythema noted, warm to touch. Pedal pulse +. Sono done. IV in place labs done. IV ABX infusing per MD order. No s/s of distress noted at this time.

## 2020-08-08 NOTE — ED ADULT TRIAGE NOTE - CHIEF COMPLAINT QUOTE
pt a+ox3, BIBA c/o right lower leg swelling x 1 week. RLE swollen, warm to touch and red. denies chest pain or SOB.

## 2020-08-31 NOTE — ED ADULT NURSE NOTE - CAS TRG GENERAL AIRWAY, MLM
OPENED IN ERROR.  SUMMARY OF MONTH 10 ABBVIE  STUDY VISIT CAPTURED AS TELEPHONE ENCOUNTER.     Patent

## 2020-12-22 ENCOUNTER — APPOINTMENT (OUTPATIENT)
Dept: NEUROLOGY | Facility: CLINIC | Age: 55
End: 2020-12-22

## 2021-04-01 PROCEDURE — G9005: CPT

## 2021-08-05 NOTE — ED ADULT TRIAGE NOTE - CADM TRG TX PRIOR TO ARRIVAL
Monitor: The patient's chronic respiratory failure is resolved. The patient does not use supplemental oxygen.  Evaluation:  No diagnostic tests required today.  Assessment/Treatment:  Continued management by specialist.  Condition will be reassessed per specialist managing the condition   none

## 2021-10-18 NOTE — HISTORY OF PRESENT ILLNESS
[Dyspnea on Exertion] : dyspnea on exertion [Initial Eval - Existing Diagnosis] : an initial evaluation of an existing diagnosis of [Currently Experiencing] : The patient is currently experiencing symptoms. [None] : No associated symptoms are reported [Short-Acting Beta Agonists] : short-acting beta agonists [Inhaled Corticosteroids] : inhaled corticosteroids [Long-Acting Beta Agonists] : long-acting beta agonists [Good Compliance] : good compliance with treatment [Good Tolerance] : good tolerance of treatment [Good Symptom Control] : good symptom control [FreeTextEntry1] : The patient was recently (2-3/2019) admitted to [facility] ICU after being intubated for seizure disorder. Prior to that, he appeared to have a pneumonia which subsequently resolved. He was previously in [facility] for a COPD exacerbation one year ago (5/2018). \par He is a current smoker of up to 2.5 ppd x 18 years though is now down to <1 ppd. I spent > 3 min discussing the risks of smoking and the benefits and therapy for smoking cessation including nicotine replacement, medications, and programs.\par He denies sleep complaints.\par Patient hospitalized in June 2019 for cough, SOB and was found to have ARF and sepsis. He is now improved and back to baseline. [Wheezing] : no wheezing [Non-Productive Cough] : no non-productive cough done

## 2021-12-01 NOTE — INPATIENT CERTIFICATION FOR MEDICARE PATIENTS - IN ORDER TO MEET MEDICARE REQUIREMENTS.
In order to meet Medicare requirements, the clinical documentation must support the information cited in the admission order.  Please be sure to provide detailed and clear documentation about the following in the admitting note/history and physical:
No

## 2022-05-05 NOTE — ED ADULT TRIAGE NOTE - RESPIRATORY RATE (BREATHS/MIN)
Patient attended Phase 2 Cardiac Rehab Exercise Session. Further documentation will be scanned into the medical record upon discharge.  
20

## 2022-07-05 NOTE — BEHAVIORAL HEALTH ASSESSMENT NOTE - SUMMARY
Subjective:      Rene Kennedy is a 8 m.o. male here with father. Patient brought in for Eye Drainage and Fussy      History of Present Illness:  History obtained from dad; started with bilateral eye discharge about a week ago, worse today; also with runny nose for about a week, seems to be almost gone now; no cough; no fevers; appetite ok; not sleeping quite as well; also with rash on cheeks since yesterday      Review of Systems   Constitutional: Negative.  Negative for activity change, appetite change, fever and irritability.   HENT: Positive for congestion and rhinorrhea.    Eyes: Positive for discharge and redness.   Respiratory: Negative.  Negative for cough.    Cardiovascular: Negative.    Gastrointestinal: Negative.  Negative for constipation, diarrhea and vomiting.   Genitourinary: Negative.  Negative for decreased urine volume.   Musculoskeletal: Negative.    Skin: Positive for rash.   Neurological: Negative.    Hematological: Negative for adenopathy.   All other systems reviewed and are negative.      Objective:     Physical Exam  Vitals and nursing note reviewed.   Constitutional:       General: He is active and playful. He is not in acute distress.     Appearance: He is well-developed. He is not ill-appearing, toxic-appearing or diaphoretic.   HENT:      Head: Normocephalic and atraumatic. Anterior fontanelle is flat.      Right Ear: Tympanic membrane and external ear normal. A PE tube is present.      Left Ear: Tympanic membrane and external ear normal. Drainage (small cloudy) present. A PE tube is present.      Nose: Nose normal. No congestion or rhinorrhea.      Mouth/Throat:      Mouth: Mucous membranes are moist.      Pharynx: Oropharynx is clear. No oropharyngeal exudate.      Tonsils: No tonsillar exudate.   Eyes:      General:         Right eye: Discharge present.         Left eye: Discharge present.     Conjunctiva/sclera:      Right eye: Right conjunctiva is injected.      Left eye: Left  conjunctiva is injected.      Pupils: Pupils are equal, round, and reactive to light.   Cardiovascular:      Rate and Rhythm: Normal rate and regular rhythm.      Pulses: Normal pulses.      Heart sounds: S1 normal and S2 normal. No murmur heard.  Pulmonary:      Effort: Pulmonary effort is normal. No respiratory distress, nasal flaring, grunting or retractions.      Breath sounds: Normal breath sounds. No stridor. No wheezing, rhonchi or rales.   Abdominal:      General: Bowel sounds are normal. There is no distension.      Palpations: Abdomen is soft. There is no hepatomegaly, splenomegaly or mass.      Tenderness: There is no abdominal tenderness. There is no guarding or rebound.      Hernia: No hernia is present.   Musculoskeletal:         General: Normal range of motion.      Cervical back: Normal range of motion and neck supple.   Lymphadenopathy:      Head: No occipital adenopathy.      Cervical: No cervical adenopathy.   Skin:     General: Skin is warm and dry.      Coloration: Skin is not jaundiced, mottled or pale.      Findings: Rash present. No lesion or petechiae. Rash is papular (on cheeks at side of mouth, few small eczematous patches on arms). Rash is not purpuric.   Neurological:      Mental Status: He is alert.         Assessment:        1. Recurrent acute suppurative otitis media without spontaneous rupture of left tympanic membrane    2. Pink eye, bilateral    3. Eczema, unspecified type         Plan:       Rene was seen today for eye drainage and fussy.    Diagnoses and all orders for this visit:    Recurrent acute suppurative otitis media without spontaneous rupture of left tympanic membrane  -     ciprofloxacin-dexamethasone 0.3-0.1% (CIPRODEX) 0.3-0.1 % DrpS; 4 gtts to the affected ear(s) bid x 10 d    Pink eye, bilateral  -     ofloxacin (OCUFLOX) 0.3 % ophthalmic solution; Place 1 drop into both eyes 3 (three) times daily. for 5 days    Eczema, unspecified type    RTC if sxs worsen or  persist, or develops new sxs       Patient a 53 y/o male, unemployed, domiciled at [facility] for 15 months, no prior SA, hx of Schizophrenia, last hospitalized at [facility] in 1998 for 8 days for medical reason and he was lat hospitalized psychiatrically in 1985, denied aggression or violence or any self mutilation habits, medically has DM with HLD was BIB/Group home for Psychiatric Evaluation.  Patient has reportedly been stealing cigarettes and and being intrusive and agitated at home while trying to steal cigarettes..  On interview, patient reports that he has "grubbed cigarettes" because he ran out of cigarrettes does not have enough money to buy more. He denies any other stressors and reports he feels fine and wants to go home.  He was not aggressive, agitated and did not appear to be responding to internal stimuli.  He appeared forthcoming on interview.  He denies depressed mood, psychotic sx's, sancho. He reports chornic passive suicidal ideation for years however denies suicidal plan or intent.  He has been compliant with medications and denies any adverse effects.     Patient was seen in ED 2 days ago by psychiatry at that time Collateral was obtained from Diane Chen NP who knows patient well and does not note patient to be suicidal/homicidal, or violent and has been compliant with medications.     Possible patient's smoking is increasing metabolism of his haldol and leading to more agitation   Patient currently does not appear to  require inpatient psychiatric hospitalization.  Plan at this time is to dc back to group home with follow up with Psychiatric NP    would increase haldol to 15 mg daily , keep night time dose at 10 mg;  discussed wellbutrin as option for smoking cessation patient declines; also does not want patch or gum

## 2022-08-25 NOTE — PATIENT PROFILE ADULT. - BILL OF RIGHTS/ADMISSION INFORMATION PROVIDED TO:
Patient: Shaka Hughes Date: 2022   : 1966    56 year old male      INPATIENT WOUND CARE PROGRESS NOTE     Supervising Wound Care / Hyperbaric Medicine Physician: Dr. Dale Rosas  Consulting Provider:  Dale Rosas DO  Date of Consultation/Last Comprehensive Exam:  [facility] 2022  Referring  Provider:  CECILLE Miller    SUBJECTIVE:    Chief Complaint:  R DFU      Wound/Ulcer Present:    Diabetic lower extremity ulcer:  Garcia grade 3 (deep ulcer with abscess or osteomyelitis)  Does the patient have a plantar wound?   Yes.  Has the patient been adequately offloaded for equal to or greater than 4 weeks?  Yes.    Diabetic foot exam performed?  Yes.     Current Vascular Assessment:  Arterial duplex study and Venous duplex study.     Current Antibiotic Regimen:  Oral,..     Current Offloading Modality:  DH shoe     History of Present Illness:  This is a 56 year old type 2 insulin-treated diabetic male smoker with HTN, CAD/CHF, CVA (), seizure (started , none prior, on keppra) who developed an ulcer on the left plantar 4th MT area in late 2020 when his \"insert\" cut the bottom and side of his left foot.   - s/p I&D of L foot 20 by Dr. Ma   - s/p left PT stent - Dr. Elizabeth - 2020   - s/p Left foot debridement 20 by Dr. Ma   - s/p L TMA 20 by Dr. Ma   Surgical path from L 4th MT 20, 20, and 20 positive for OM   - s/p left TMA debridement on 21 by Dr. Ma with application of Integra graft.      Admitted 3/18- with worsening pain and erythema to right foot, diagnosed with OM of right 5th MTP joint by MRI. Dr. Sanders followed; treated with vanco + cefepime then meropenem and discharged on PO clindamycin.     Pt called clinic back on 2022 and felt his wound re-openeded  Returned to wound clinic 2022 for re-eval of Left plantar foot ulcer. Left TCC started 2022. New right foot noted 22, concern for  abscess/infection. RIGHT foot cultured and patient admitted 5/19/2022 - 5/31/2022.      Repeat wound debridement of the right foot by Dr. Del Real on 5/22/2022 with I&D and abcess/deep tissue infection surgically debrided.  Patient returned to OR  5/25/22 for further debridement and NPWT application. Patient was on antibiotics Augmentin + Doxycycline and will complete regimen on 06/19/22.    Patient has had BLE arterial duplex 5/23/2022 showing 3 vessel runoff. At 6/29/2022 [facility] visit, pt noted to reamined stalled in healing, so HBOT was discussed and patient agreeable for adjunctive HBOT in setting of DFU3 refractory to > 30 days of care.  Plan to start once authorization approved.      Admitted to [facility] on 7/8/2022 with worsening DFU. MRI 7/9/2022 - Osteomyelitis involves the majority of the fifth metatarsal  Surgical revision of 5th ray resection with tendon transfer on 7/11/22.     Completed 18 HBOT on 8/17/22 - clinical course waxed and waned during HBOT and advanced wound care efforts.     8/18/2022 - Patient's right foot wound deteriorating despite advanced wound therapy and adjunctive HBOT.  There patient has been walking on his foot, but has bilateral foot ulcers.  Bilateral casting poses risk for falls.  The patient has to climb a flight stairs to enter his residence and he has already fallen down steps in the past. Has attempted to use crutches, but finds it difficult to maneuver.  Had at length discussion regarding goals and direction of care.  Reviewed continued current course efforts vs other options such as moving forward with BKA given patient has had 3 foot operations since May 2022 and wound has not shown improvements.  Pt agreed that wound has not been getting better and his goal is to have a functional limb (currently he does not feel this foot with ulcer is functional enough to meet his life needs).  After much discussion and review, pt decided he would like to move forward with BKA on right. Plans  to hold HBOT at this time 8/18/2022. Pt would like to get some social things arranged and then will likely present to the emergency department for admission this weekend for surgical evaluation for likely further amputation/R BKA. Patient will have daily wound cares until he presents for admission.    8/20/2022 - pt admitted to the hospital and had right BKA with Dr. Campos on 8/23/22.    Interval History: 8/25/2022   Pt seen bedside.  Was seen by PM&R today.  Expressing concerns about \"being stuck in a wheelchair forever.\"  No other acute complaints.    Current Treatment Regimen:  Dressing:  see below   Frequency:  Daily   Changed by:  Staff/bedside nurse    Review of Systems:  Pertinent items are noted in HPI (history of present illness).    Past Medical History:   Diagnosis Date   • Abscess of left leg 03/2017    required draining- Dr. Quintana of ID   • Acute osteomyelitis of ankle or foot, right (CMS/HCC) 03/30/2021   • Acute pancreatitis 04/18/2009   • Adjustment disorder 08/16/2017   • Cerebral infarction (CMS/HCC) 05/2016    stated was r/t his diabetes   • Chronic gastritis 05/25/2021    Dr. Freeman   • Chronic kidney disease    • Chronic total occlusion of coronary artery 04/10/2017   • Closed nondisplaced fracture of left patella 07/14/2018   • Colon polyp 05/25/2021    Dr. Freeman, 5 tubular adenomas. recall 3 years   • Congestive cardiac failure (CMS/HCC) 04/09/2017    cardiomyopathy LVEF 29%   • COPD (chronic obstructive pulmonary disease) (CMS/[facility])    • Coronary artery disease 04/09/2017   • CVA (cerebral vascular accident) (CMS/HCC) 05/18/2016   • Diabetic ulcer of right midfoot associated with type 2 diabetes mellitus, with necrosis of muscle (CMS/HCC) 6/29/2022   • Dysphagia 02/19/2019   • Dyspnea    • ED (erectile dysfunction) 09/22/2016   • Essential (primary) hypertension 07/05/2016   • High cholesterol    • Ischemic cardiomyopathy 07/19/2017   • Left hemiparesis (CMS/HCC) 07/05/2016   • Left leg  pain 03/02/2017   • Left ventricular dysfunction 05/08/2017   • Leg edema    • Major depression 09/20/2017   • Marijuana smoker 04/17/2017    continues to use   • Myocardial infarction (CMS/[facility])    • Osteomyelitis of left foot (CMS/HCC) 12/16/2020   • Osteomyelitis of toe of right foot (CMS/[facility]) 03/30/2021   • Polyneuropathy 02/07/2018   • Smoker     states has quit since being hospitalized   • Temporomandibular joint (TMJ) pain 09/22/2016   • Type 1 diabetes mellitus with complications (CMS/[facility])     insulin dependent, BS stable   • Uses walker 2021    or wheelchair depending on distance required   • Wears glasses     for reading     Past Surgical History:   Procedure Laterality Date   • Amputation Left     half of left foot   • Cardiac catherization  04/09/2017   • Cdl ptca w/ stent  04/19/2017     catheter of LAD   • Colonoscopy     • Colonoscopy  05/25/2021    Dr. Freeman   • Egd  05/25/2021    Dr. Freeman   • Ir kidney biopsy  04/13/2021   • Past surgical history      none     Social History     Tobacco Use   • Smoking status: Current Some Day Smoker     Packs/day: 0.50     Years: 34.00     Pack years: 17.00     Types: Cigarettes     Start date: 6/1/1983   • Smokeless tobacco: Never Used   Substance Use Topics   • Alcohol use: No     Alcohol/week: 0.0 standard drinks     Family History   Problem Relation Age of Onset   • COPD Mother    • Patient is unaware of any medical problems Father    • Inflammatory Bowel Disease Sister    • Cancer Maternal Grandmother        Current Facility-Administered Medications   Medication   • HYDROmorphone (DILAUDID) injection 0.2 mg   • insulin glargine (LANTUS) injection 30 Units   • insulin lispro (ADMELOG,HumaLOG) - Correction Dose   • insulin lispro (ADMELOG, HumaLOG) injection 7 Units   • sodium chloride 0.9% infusion   • folic acid (FOLATE) tablet 5 mg   • ferrous sulfate (65 mg Fe per 325 mg) tablet 325 mg   • heparin (porcine) injection 5,000 Units   • sodium  hypochlorite (DAKINS) 0.125 % (1/4 strength) irrigation solution   • sodium chloride 0.9 % flush bag 25 mL   • sodium chloride (PF) 0.9 % injection 2 mL   • amLODIPine (NORVASC) tablet 10 mg   • aspirin (ECOTRIN) enteric coated tablet 81 mg   • atorvastatin (LIPITOR) tablet 20 mg   • carvedilol (COREG) tablet 12.5 mg   • clopidogrel (PLAVIX) tablet 75 mg   • escitalopram (LEXAPRO) tablet 10 mg   • hydrALAZINE (APRESOLINE) tablet 100 mg   • lamoTRIgine (LaMICtal) tablet 100 mg   • losartan (COZAAR) tablet 50 mg   • pantoprazole (PROTONIX) EC tablet 40 mg   • dextrose 50 % injection 25 g   • dextrose 50 % injection 12.5 g   • glucagon (GLUCAGEN) injection 1 mg   • dextrose (GLUTOSE) 40 % gel 15 g   • dextrose (GLUTOSE) 40 % gel 30 g   • sodium chloride 0.9 % flush bag 25 mL   • insulin lispro (ADMELOG,HumaLOG) - Correction Dose   • Potassium Standard Replacement Protocol (Levels 3.5 and lower)   • Potassium Replacement (Levels 3.6 - 4)   • ondansetron (ZOFRAN) injection 4 mg   • acetaminophen (TYLENOL) tablet 650 mg   • HYDROcodone-acetaminophen (NORCO) 5-325 MG per tablet 1 tablet   • torsemide (DEMADEX) tablet 40 mg   • gabapentin (NEURONTIN) capsule 600 mg      ALLERGIES:  Clonidine    OBJECTIVE:    Visit Vitals  /62 (BP Location: RUE - Right upper extremity, Patient Position: Sitting)   Pulse 72   Temp 98.1 °F (36.7 °C) (Oral)   Resp 18   Ht 6' 1\" (1.854 m)   Wt 123.1 kg (271 lb 6.2 oz) Comment: pt weighed twice   SpO2 96%   BMI 35.81 kg/m²           Physical Exam:  General appearance: Alert, in no distress and cooperative  Head:   normocephalic without obvious abnormality  Pulmonary: normal respiratory effort     8/25/2022  Right lower extremity s/p BKA with drain and incisional VAC in place.    Two left foot plantar circular ulcers with granular base. Both with shallow depth and no palpable bone. Both with substantial periwound callus. No erythema or undue warmth.    Images (8/22/2022)      Wound Bed  Quality: see above   Anastasiya-wound Quality: See above  Additional Descriptors: see above    Wound Measurements Per Flowsheet:       Wound Foot Left Plantar;Superior/Upper Diabetic Ulcer (Active)   Wound Length (cm) 1 cm 08/24/22 2111   Wound Width (cm) 1.3 cm 08/24/22 2111   Wound Depth (cm) 0.1 cm 08/24/22 2111   Wound Surface Area (cm^2) 1.3 cm^2 08/24/22 2111   Wound Volume (cm^3) 0.13 cm^3 08/24/22 2111   Number of days: 64       Wound Toe, 5th Right Incision (Active)   Number of days: 45       Wound Foot Left Plantar;Inferior/Lower Diabetic Ulcer (Active)   Wound Length (cm) 0.8 cm 08/24/22 1453   Wound Width (cm) 1.2 cm 08/24/22 1453   Wound Depth (cm) 0.6 cm 08/20/22 1818   Wound Surface Area (cm^2) 0.96 cm^2 08/24/22 1453   Wound Volume (cm^3) 1.44 cm^3 08/20/22 1818   Number of days: 5       Wound Leg Left Anterior Abrasion (Active)   Wound Length (cm) 1 cm 08/24/22 2111   Wound Width (cm) 1 cm 08/24/22 2111   Wound Surface Area (cm^2) 1 cm^2 08/24/22 2111   Number of days: 5       Wound Leg Right Inferior/Lower Incision (Active)   Number of days: 2     PROCEDURE:  None performed  Not indicated       Performed By:NA    Laboratory assessments reviewed:  Prealbumin (mg/dL)   Date Value   07/08/2022 16.8 (L)      Albumin (g/dL)   Date Value   08/20/2022 2.1 (L)   07/08/2022 3.1 (L)   05/09/2022 3.3 (L)      Recent Labs   Lab 08/24/22  2105 08/25/22  0758 08/25/22  1322   GLUCOSE BEDSIDE 154* 158* 206*       Lab Results   Component Value Date    WBC 13.4 (H) 08/25/2022    GLUCOSE 143 (H) 08/25/2022    HGBA1C 7.1 (H) 07/08/2022    CRP 11.0 (H) 07/08/2022    RESR 71 (H) 07/08/2022    CREATININE 2.29 (H) 08/25/2022    GFRA >90 09/14/2017    GFRNA >90 09/14/2017        Infection/inflammation lab results summary:   Recent Labs   Lab 08/25/22  0334 08/24/22  0712 08/23/22  1528 08/23/22  0335 08/22/22  0351 08/21/22  0332 08/20/22  1318 08/20/22  1314 07/09/22  0455 07/08/22  1445 07/08/22  1444 07/08/22  1355  07/08/22  1322 05/20/22  0532 05/19/22  1718   WBC 13.4* 15.7* 9.4 9.9 10.7 12.8*  --  13.1*   < >  --   --   --  13.0*   < > 16.3*   C-Reactive Protein  --   --   --   --   --   --   --   --   --   --   --   --  11.0*  --  21.0*   RBC Sedimentation Rate  --   --   --   --   --   --   --   --   --  71*  --   --   --   --  72*   Procalcitonin  --   --   --   --   --   --   --  0.06  --   --  0.08  --  0.08  --   --    VLA  --   --   --   --   --   --  0.6  --   --   --   --  1.0  --   --   --     < > = values in this interval not displayed.      Culture results:  Specimen Description (no units)   Date Value   03/04/2017 ABSCESS THIGH LEFT IR TO DRAIN   03/04/2017 ABSCESS THIGH LEFT IR TO DRAIN TODAY   03/02/2017 BLOOD, PERIPHERAL HAND, LEFT   03/02/2017 BLOOD, PERIPHERAL ANTECUBITAL,RIGHT     Gram Stain (no units)   Date Value   08/15/2022 Rare Polymorphonuclear cells.   08/15/2022 No epithelial cells seen.   08/15/2022 Rare Gram positive cocci.   08/01/2022 Many Polymorphonuclear cells.   08/01/2022 No epithelial cells seen.   08/01/2022 Few Gram positive bacilli.   08/01/2022 Few Gram positive cocci.   08/01/2022 Rare Gram negative bacilli.     CULTURE WITH GRAM STAIN, ANAEROBE/AEROBE (no units)   Date Value   07/11/2022 Few Staphylococcus aureus, methicillin resistant (A)   07/11/2022 Rare Diphtheroids (A)   05/25/2022 Few Staphylococcus aureus, methicillin resistant (A)   05/25/2022 Few      CULTURE WITH GRAM STAIN, AEROBIC (no units)   Date Value   08/15/2022 Moderate Corynebacterium striatum (A)   08/01/2022 Moderate Mixed aerobic ene   05/19/2022 Many Streptococcus agalactiae (Strep Group B) (A)   05/19/2022 Rare Staphylococcus aureus, methicillin resistant (A)   05/19/2022 Few Mixed aerobic ene     CULTURE (no units)   Date Value   03/04/2017 NO AEROBIC OR ANAEROBIC BACTERIAL GROWTH   03/04/2017 NO GROWTH 31 DAYS.   03/02/2017 NO GROWTH 5 DAYS.   03/02/2017 NO GROWTH 5 DAYS.         Diagnostic Assessments  Reviewed:     MRI-right Hindfoot-05/19/2022    1. Partial visualization of a mixed signal intensity collection in the plantar soft tissues just lateral to the base of the fifth metatarsal. There is suggestion of a small amount of susceptibility artifact within this which could reflect air. A discrete or well demarcated T2 hyperintense collection is not identified in this region though the findings may be reflective of a early/developing abscess. There is no definite evidence of subjacent osteomyelitis in the base of the fifth metatarsal.   2. Large amount of nonspecific edema and inflammation with superimposed cellulitis in the ankle.  3. There is no evidence of osteomyelitis in the ankle with no ankle joint effusion.    Venous duplex-right-05/19/2022  Negative for DVT    5/23/22 BLE arterial duplex;   IMPRESSION:   1.  No evidence of pelvic arterial inflow disease.  2.  No evidence of bilateral femoropopliteal disease.  3.  Three-vessel tibial runoff to the bilateral feet.         Nutritional Assessment:  Prealbumin and/or Albumin reviewed    Wound treatment goals are palliative:  No    DIAGNOSES:  Diabetic lower extremity ulcer, Garcia grade 3 (deep ulcer with abscess or osteomyelitis) left foot and right lateral foot    Non-healing surgical wound    Diabetes Poorly controlled  Smoker   MRSA wound culture on 5/25/22       Medical Decision Making:    Patient seen for IP follow up r/g diabetic foot wounds.     8/20/2022 - admitted to hospital with goal of R BKA due to nonhealing RLE foot ulcers.  8/22/2022 - seen bedside, on IV abx, ID and surgical consults pending.   8/25/22 - seen at bedside post-BKA. Incisional VAC in place at right BKA site. Cefepime/vancomycin discontinued on 8/24/22.    Topical wound care:  Left foot:  Remove old dressings  Wash wounds with soap/water/pat dry  Iodoflex to wound bases  Change BID and prn    Right BKA:  Incisional VAC in place, defer to surgical team    Edema  low tetras, on  daily, off at bedtime with leg elevation    Vascular:  Arterial: 5/23/22 BLE arterial duplex with 3 vessel tibial runoff to the bilateral feet.   Venous: RLE venous US on 5/19/22 negative for DVT    Infectious Disease:  -7/9/2022: Right foot with cellulitis, palpable bone and pus to right lateral foot wound  ID Dr Richmond saw pt 7/9/2022 - vanc/cef/flagyl  MRI 7/9/2022 - Osteomyelitis involves the majority of the fifth metatarsal  He completed antibiotics Augmentin + Doxycycline 6/19/2022.  Surgical pathology with acute OM with clean surgical margins.     -PO doxy and now Levaquin per Dr. Richmond 8/4/22.    Plans to change antibiotic after 8/15  8/18/2022 - informed Dr. Richmond of pt plan to pursue BKA    8/22/2022 - on IV cef and vanc, await ID consult/recommendations  8/24/2022 - Cefepime and vancomycin discontinued    Offloading:  Discussed importance of keeping pressure off of wound.   Heel weight bearing only bilaterally, limit ambulation for now  DH shoe recommended for Left foot for offloading.  Insurance not approved for new DH shoe until 02/2023.  Encouraged he start wearing his custom shoe/insert to his L foot if available.  Otherwise DH shoe.    Nutrition/ Diabetes:  Registered dietician following, appreciate recommendations.    Encourage tight glycemic control for wound healing    Tobaccos Abuse:  Strongly encouraged and educated on the importance of smoking cessation.     Surgical plan:  Dr. Cross following, last note from podiatry was 7/12/22.    See surgical history above.   Will need to follow up with surgery for suture removal soon   - Has a follow up with Dr. Cross 8/24/22 8/22/2022 - admitted to hospital, DPM consult pending, will place Ortho consult for BKA  8/23/2022 - Right BKA with Dr. Campos    HBO:  Hold HBOT, See corresponding HBO note for further progress and plan regarding concomitant HBO therapy. Completed 18 HBOT on 8/17/22  IF R BKA shows any flap compromise, would consider  restarting HBOT.      Follow up  Intermittently on admission, particularly when surgical team takes down right BKA dressing and incisonal VAC. Call sooner if needed    Plan of Care:  Advanced Wound Care Recommendations:  See above   Percent Wound Closure from consult:  See above  Care plan to augment wound closure: Not applicable.  See Above    Significant note data copied forward from Dr. Dale Rosas and reviewed by me as needed in the formulation of this note and plan.    Camacho Hernadez MD  [facility] and Hyperbaric Medicine Fellow  Patient was seen and assessed with attending physician Dr. Ballard.    Teaching Attestation:  I have personally interviewed and examined the patient via face-to-face. I confirmed the findings listed below:    • S/p Right BKA  • Left Diabetic foot ulcer     This was discussed with the fellow and I agree with the assessment and plan as documented. I was present for the entire procedure.  The plan of care was discussed with the patient.    VAC in place on right BKA incision per ortho; plan appears to be takedown after 1 week.  Left plantar wounds small and relatively shallow - no bone palpable today.  DH shoe customized today.    Jorge Ballard MD  [phone] (p)       Patient

## 2022-09-20 NOTE — ED PROVIDER NOTE - CARDIAC, MLM
By Dr. Bradley 9/16   Normal rate, regular rhythm.  Heart sounds S1, S2.  No murmurs, rubs or gallops.

## 2023-01-11 NOTE — ED BEHAVIORAL HEALTH ASSESSMENT NOTE - RECENT MEMORY
Follow-up with your primary care provider at upcoming appointment  You may need to discuss ways to try and prevent gouty flares to avoid need for acute treatment  Please note that repeated prednisone treatment has its own potential adverse side effects    Take prednisone as instructed  While on prednisone do not take any ibuprofen or ibuprofen like products  May safely take Tylenol if needed  Normal

## 2023-05-14 NOTE — ED ADULT TRIAGE NOTE - NS ED NURSE DIRECT TO ROOM YN
Tylenol or Ibuprofen  Fluids  Rest  Follow with your Doctor for elevated Blood pressure.   Yes Yes...

## 2024-01-01 NOTE — DISCHARGE NOTE NURSING/CASE MANAGEMENT/SOCIAL WORK - NSDCVIVACCINE_GEN_ALL_CORE_FT
Based on # of Babies in Utero = *  Extramural Delivery = *  Gestational Age of Birth = *  Number of Prenatal Care Visits = *  EFW = *  Birthweight = *    * if criteria is not previously documented No Vaccines Administered.

## 2024-02-01 NOTE — H&P ADULT - NSICDXPASTSURGICALHX_GEN_ALL_CORE_FT
Nephrology Progress Note  2/1/2024 1:16 PM  Subjective:     Interval History: Dewey Sosa is a 44 y.o. male  who appears to be doing well in the setting of HIV with dialysis Monday Wednesday Friday    Data:   Scheduled Meds:   magnesium oxide  400 mg Oral BID    omega-3 acid ethyl esters  2 g Oral BID    insulin glargine  20 Units SubCUTAneous Nightly    nystatin  5 mL Oral 4x Daily    insulin lispro  10 Units SubCUTAneous TID WC    insulin lispro  0-8 Units SubCUTAneous TID WC    insulin lispro  0-8 Units SubCUTAneous 2 times per day    dolutegravir sodium  50 mg Oral Daily    darunavir-cobicistat  1 tablet Oral Daily    lamiVUDine  100 mg Oral Daily    entecavir  0.5 mg Oral Weekly - Monday    sulfamethoxazole-trimethoprim  1 tablet Oral Once per day on Mon Wed Fri    pantoprazole (PROTONIX) 40 mg in sodium chloride (PF) 0.9 % 10 mL injection  40 mg IntraVENous Daily    folic acid  1 mg Oral Daily    metoprolol succinate  100 mg Oral Daily    nicotine  1 patch TransDERmal Daily    calcitRIOL  0.5 mcg Oral Daily    vitamin D  50,000 Units Oral Weekly    calcium carbonate  500 mg Oral TID    sodium chloride flush  5-40 mL IntraVENous 2 times per day    heparin (porcine)  5,000 Units SubCUTAneous 3 times per day    aspirin  81 mg Oral Daily    [Held by provider] ticagrelor  90 mg Oral BID    ezetimibe  10 mg Oral Nightly    busPIRone  10 mg Oral TID    atorvastatin  80 mg Oral Nightly    traZODone  50 mg Oral Nightly    isosorbide mononitrate  60 mg Oral Daily     Continuous Infusions:   sodium chloride      sodium chloride      sodium chloride      dextrose           CBC   Recent Labs     01/30/24  0430 01/31/24  0314 02/01/24  0540   WBC 5.6 5.2 4.6   HGB 8.3* 8.3* 8.4*   HCT 25.0* 25.3* 26.1*    214 237      BMP   Recent Labs     01/30/24  0430 01/31/24  0314 02/01/24  0540    130* 135   K 3.9 4.2 4.8    98* 100   CO2 25 21 25   PHOS 3.8 4.5 4.1   BUN 39* 51* 39*   CREATININE 4.2* 4.6*  PAST SURGICAL HISTORY:  No significant past surgical history

## 2024-08-14 NOTE — ED ADULT NURSE NOTE - BREATHING, MLM
Spontaneous, unlabored and symmetrical Post-Care Instructions: I reviewed with the patient in detail post-care instructions. Patient is to wear sunprotection, and avoid picking at any of the treated lesions. Pt may apply Vaseline to crusted or scabbing areas. Render Note In Bullet Format When Appropriate: No Duration Of Freeze Thaw-Cycle (Seconds): 0 Render Post-Care Instructions In Note?: yes Consent: The patient's consent was obtained including but not limited to risks of crusting, scabbing, blistering, scarring, darker or lighter pigmentary change, recurrence, incomplete removal and infection. Detail Level: Detailed
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
## 2025-06-18 NOTE — ED ADULT NURSE NOTE - CINV DISCH EXIT CARE INSTR PROVIDE
Lumbar Puncture Procedure Note:  Indication: Obtain spinal fluid for diagnostic testing    Consent: The patient was counseled regarding the procedure, it's indications, risks, potential complications and alternatives and any questions were answered. Consent was obtained.    Procedure: The patient was placed in the left lateral decubitus position and anatomical landmarks were identified. The skin was prepped and area draped in the usual sterile fashion.  Anesthesia was achieved using 1% Lidocaine without epinephrine. A 20 gauge spinal needle with stylet was inserted at the L2- L3 level. The opening pressure was normal. 20 cc of clear cerebrospinal fluid was obtained and sent to the lab for routine studies of protein, glucose, and cell counts, and VDRL, cryptococcal antigen, cytology, multiple sclerosis (MS) panel, and bacterial cultures The stylet was replaced and the needle withdrawn.  A sterile dressing was applied.    The patient tolerated the procedure well, without difficulty.    Complications: None    Post-lumbar puncture care and activity was reviewed with the patient.      Brooks Weaver MD   6/18/2025     
yes